# Patient Record
Sex: FEMALE | Race: WHITE | NOT HISPANIC OR LATINO | Employment: UNEMPLOYED | URBAN - METROPOLITAN AREA
[De-identification: names, ages, dates, MRNs, and addresses within clinical notes are randomized per-mention and may not be internally consistent; named-entity substitution may affect disease eponyms.]

---

## 2017-01-24 ENCOUNTER — OFFICE VISIT (OUTPATIENT)
Dept: INTERNAL MEDICINE | Facility: CLINIC | Age: 52
End: 2017-01-24

## 2017-01-24 VITALS
SYSTOLIC BLOOD PRESSURE: 140 MMHG | TEMPERATURE: 98.1 F | DIASTOLIC BLOOD PRESSURE: 86 MMHG | HEIGHT: 65 IN | BODY MASS INDEX: 29.46 KG/M2 | WEIGHT: 176.8 LBS

## 2017-01-24 DIAGNOSIS — F41.8 DEPRESSION WITH ANXIETY: ICD-10-CM

## 2017-01-24 DIAGNOSIS — M19.90 ARTHRITIS: ICD-10-CM

## 2017-01-24 DIAGNOSIS — E78.00 PURE HYPERCHOLESTEROLEMIA: Primary | ICD-10-CM

## 2017-01-24 DIAGNOSIS — J30.89 PERENNIAL ALLERGIC RHINITIS, UNSPECIFIED ALLERGIC RHINITIS TRIGGER: ICD-10-CM

## 2017-01-24 PROCEDURE — 99214 OFFICE O/P EST MOD 30 MIN: CPT | Performed by: FAMILY MEDICINE

## 2017-01-24 RX ORDER — ACETAMINOPHEN AND CODEINE PHOSPHATE 300; 30 MG/1; MG/1
TABLET ORAL
COMMUNITY
Start: 2016-12-21 | End: 2018-02-13

## 2017-01-24 RX ORDER — MONTELUKAST SODIUM 10 MG/1
10 TABLET ORAL DAILY
Qty: 30 TABLET | Refills: 5 | Status: SHIPPED | OUTPATIENT
Start: 2017-01-24 | End: 2018-02-13

## 2017-01-24 RX ORDER — ATORVASTATIN CALCIUM 10 MG/1
10 TABLET, FILM COATED ORAL DAILY
Qty: 30 TABLET | Refills: 5 | Status: SHIPPED | OUTPATIENT
Start: 2017-01-24 | End: 2018-02-13

## 2017-01-24 RX ORDER — ARIPIPRAZOLE 5 MG/1
TABLET ORAL
Qty: 30 TABLET | Refills: 0 | Status: SHIPPED | OUTPATIENT
Start: 2017-01-24 | End: 2017-02-27

## 2017-01-24 RX ORDER — DULOXETIN HYDROCHLORIDE 60 MG/1
60 CAPSULE, DELAYED RELEASE ORAL DAILY
Qty: 30 CAPSULE | Refills: 5 | Status: SHIPPED | OUTPATIENT
Start: 2017-01-24 | End: 2018-02-13

## 2017-01-24 RX ORDER — MELOXICAM 7.5 MG/1
TABLET ORAL
COMMUNITY
Start: 2016-11-28 | End: 2018-02-13 | Stop reason: SDUPTHER

## 2017-01-24 RX ORDER — HYDROCODONE BITARTRATE AND ACETAMINOPHEN 7.5; 325 MG/1; MG/1
TABLET ORAL
COMMUNITY
Start: 2016-12-20 | End: 2018-02-13

## 2017-01-24 RX ORDER — BACLOFEN 20 MG/1
20 TABLET ORAL 2 TIMES DAILY
Qty: 60 TABLET | Refills: 5 | Status: SHIPPED | OUTPATIENT
Start: 2017-01-24 | End: 2018-02-13

## 2017-01-24 NOTE — PROGRESS NOTES
Subjective   Vish Russo is a 51 y.o. female.     History of Present Illness   Here for routine. Last seen 9/8/16 for a foot injury. Was seen 8/16/16 for routine. Was seen 7/11/16 for CPE and mood. Had labs 7/11/16 with normal CBC, CMP, TSH and B12. Vit D was 22. Lipid demo , tg 219, HDL 67, . Lab 8/16/16 demo , tg 127, HDL 47,  on Lipitor 10.     1.CV- hyperlipid, currently on Lipitor. Had lab at goal 8/16/16. Pt was advised to cut dose if half prn increased leg cramps. Today pt reports she did cut the dose in half. Is doing well with current dose but is not sure what time to take it.     2.RESP- intermittent hoarseness, likely related to allergies. Pt was started on singulair and Claritin and improved 40%. Was given addition of Flonase. Was on Chantix and was encouraged to continue to work on smoking cessation. Today she reports she has been having dental work. Was on lortab for this but then got arrested for having this medicine on her person while driving (got pulled over for turning right on red). Was reduced to a misdemeanor with a fine as it was just in an improper container but was appropriate with no DUI. She finished the chantix but then got stressed with the situations and she restarted smoking.     3.GYN- ovarian cyst. Pt had US after her CPE and this demo an involuting cyst on the left. Was given prn tramadol. Was gradually improving. Limited the tramadol due to S/E. Today she reports the tramadol was not helpful. Is on pain meds for her ortho issues and cannot assess her ovarian issues due to the ortho pain.     4.ORTHO- right CTS. Pt using a brace and doing stretches but worsening and was referred to the hand specialist. . Pt was then seen 9/8/16 with a fall with multiple injuries. Had been to ER and had xray of left wrist, hip, knee and ankle; neg other than moderate OA. Had MRI ordered for foot by me but pt cancelled this. Today she reports she has been seeing [facility].  Did PT and then had the MRI of her foot and knee. Got a steroid shot in her right foot and her left knee 4 days ago. May need surgery as she has a neuroma in her foot and degenerated cartilage in her knee. Was advised this is from the fall. Is using tylenol #3. Is trying to work.    5. PSYCH- depression with anxiety- at her initial visit pt reported a h/o inadequate care (was being seen by someone impersonating a doctor) and had been afraid to go to a doctor since then. Was having issues with sad, irritable and anxious mood with guilt, anhedonia, feeling overwhelmed, fatigue, withdrawing, decreased motivation, crying, memory/ concentration problems, palpitations and insomnia. Also had manic episodes. Is now on Cymbalta with Seroquel. Has done well with no S/E and less anxiety. Was having some situational issues at last visit. Was on Seroquel 1 am, 2 pm. Today pt reports that she is now working nights and having trouble tolerating the seroquel due to sedation. Has not improved with changing her dose timing. Without it she is not sleeping and been crying more. Has not been on zyprexa or abilify before.     The following portions of the patient's history were reviewed and updated as appropriate: current medications, past family history, past medical history, past social history, past surgical history and problem list.    Review of Systems   Cardiovascular: Negative for chest pain.   Gastrointestinal: Negative for abdominal distention and abdominal pain.   Skin: Negative for color change.   Neurological: Negative for tremors, speech difficulty and headaches.   Psychiatric/Behavioral: Negative for agitation and confusion.   All other systems reviewed and are negative.        Current Outpatient Prescriptions:   •  acetaminophen-codeine (TYLENOL #3) 300-30 MG per tablet, , Disp: , Rfl:   •  ARIPiprazole (ABILIFY) 5 MG tablet, Take 1 tab po qhs, Disp: 30 tablet, Rfl: 0  •  atorvastatin (LIPITOR) 10 MG tablet, Take 1 tablet  "by mouth Daily., Disp: 30 tablet, Rfl: 5  •  baclofen (LIORESAL) 20 MG tablet, Take 1 tablet by mouth 2 (Two) Times a Day. Take 1 tablet 3 times daily as needed for muscle spasm., Disp: 60 tablet, Rfl: 5  •  DULoxetine (CYMBALTA) 60 MG capsule, Take 1 capsule by mouth Daily., Disp: 30 capsule, Rfl: 5  •  HYDROcodone-acetaminophen (NORCO) 7.5-325 MG per tablet, , Disp: , Rfl:   •  hydrOXYzine (ATARAX) 25 MG tablet, TAKE ONE TABLET BY MOUTH EVERY 6 HOURS, Disp: 30 tablet, Rfl: 0  •  meloxicam (MOBIC) 7.5 MG tablet, , Disp: , Rfl:   •  montelukast (SINGULAIR) 10 MG tablet, Take 1 tablet by mouth Daily. Take 1 tablet daily as needed for allergies, Disp: 30 tablet, Rfl: 5    Objective     Visit Vitals   • /86   • Temp 98.1 °F (36.7 °C)   • Ht 64.5\" (163.8 cm)   • Wt 176 lb 12.8 oz (80.2 kg)   • BMI 29.88 kg/m2       Physical Exam   Constitutional: She is oriented to person, place, and time. She appears well-developed and well-nourished.   HENT:   Right Ear: Tympanic membrane and ear canal normal.   Left Ear: Tympanic membrane and ear canal normal.   Mouth/Throat: Oropharynx is clear and moist.   Eyes: Conjunctivae and EOM are normal. Pupils are equal, round, and reactive to light.   Neck: No thyromegaly present.   Cardiovascular: Normal rate and regular rhythm.    Pulmonary/Chest: Effort normal and breath sounds normal.   Neurological: She is alert and oriented to person, place, and time.   Skin: Skin is warm and dry.   Psychiatric: She has a normal mood and affect.   Vitals reviewed.      Assessment/Plan   Vish was seen today for follow-up.    Diagnoses and all orders for this visit:    Pure hypercholesterolemia  -     atorvastatin (LIPITOR) 10 MG tablet; Take 1 tablet by mouth Daily.    Perennial allergic rhinitis, unspecified allergic rhinitis trigger  -     montelukast (SINGULAIR) 10 MG tablet; Take 1 tablet by mouth Daily. Take 1 tablet daily as needed for allergies    Arthritis  -     baclofen (LIORESAL) " "20 MG tablet; Take 1 tablet by mouth 2 (Two) Times a Day. Take 1 tablet 3 times daily as needed for muscle spasm.    Depression with anxiety  -     ARIPiprazole (ABILIFY) 5 MG tablet; Take 1 tab po qhs  -     DULoxetine (CYMBALTA) 60 MG capsule; Take 1 capsule by mouth Daily.      1. CV- hyperlipid- will continue the lipitor at a half dose with RF x6mo today.  2. RESP- allergies- will consider using the chantix again when ready. Will continue singulair now with RF x6mo today.  3. ORTHO- arthritis- treating with ortho now.  4. PSYCH- depression with anxiety- discussed that she still needs an \"atypical\" serotonin medicine in combo with the cymbalta. However, since the seroquel is too sedating, we can try abilify instead.  5. RECHECK- 1mo mood       "

## 2017-01-24 NOTE — PATIENT INSTRUCTIONS
"1. CV- hyperlipid- will continue the lipitor at a half dose with RF x6mo today.  2. RESP- allergies- will consider using the chantix again when ready. Will continue singulair now with RF x6mo today.  3. ORTHO- arthritis- treating with ortho now. Will RF baclofen today.  4. PSYCH- depression with anxiety- discussed that she still needs an \"atypical\" serotonin medicine in combo with the cymbalta. However, since the seroquel is too sedating, we can try abilify instead.  5. RECHECK- 1mo mood  "

## 2017-01-24 NOTE — MR AVS SNAPSHOT
Vish Russo   1/24/2017 11:00 AM   Office Visit    Dept Phone:  [phone]   Encounter #:  [id number]    Provider:  Leyda Alaniz MD   Department:  [facility]                Your Full Care Plan              Today's Medication Changes          These changes are accurate as of: 1/24/17 11:48 AM.  If you have any questions, ask your nurse or doctor.               New Medication(s)Ordered:     ARIPiprazole 5 MG tablet   Commonly known as:  ABILIFY   Take 1 tab po qhs   Started by:  Leyda Alaniz MD         Medication(s)that have changed:     DULoxetine 60 MG capsule   Commonly known as:  CYMBALTA   Take 1 capsule by mouth Daily.   What changed:  See the new instructions.   Changed by:  Leyda Alaniz MD       HYDROcodone-acetaminophen 7.5-325 MG per tablet   Commonly known as:  NORCO   What changed:  Another medication with the same name was removed. Continue taking this medication, and follow the directions you see here.   Changed by:  Leyda Alaniz MD         Stop taking medication(s)listed here:     QUEtiapine 50 MG tablet   Commonly known as:  SEROquel   Stopped by:  Leyda Alaniz MD           traMADol 50 MG tablet   Commonly known as:  ULTRAM   Stopped by:  Leyda Alaniz MD           varenicline 1 MG tablet   Commonly known as:  CHANTIX CONTINUING MONTH PAK   Stopped by:  Leyda Alaniz MD                Where to Get Your Medications      These medications were sent to [address] AT [facility] [phone] [facility] [phone] [place] [address]     Phone:  [phone]     ARIPiprazole 5 MG tablet    atorvastatin 10 MG tablet    baclofen 20 MG tablet    DULoxetine 60 MG capsule    montelukast 10 MG tablet                  Your Updated Medication List          This list is accurate as of: 1/24/17 11:48 AM.  Always use your most recent med list.  "               acetaminophen-codeine 300-30 MG per tablet   Commonly known as:  TYLENOL #3       ARIPiprazole 5 MG tablet   Commonly known as:  ABILIFY   Take 1 tab po qhs       atorvastatin 10 MG tablet   Commonly known as:  LIPITOR   Take 1 tablet by mouth Daily.       baclofen 20 MG tablet   Commonly known as:  LIORESAL   Take 1 tablet by mouth 2 (Two) Times a Day. Take 1 tablet 3 times daily as needed for muscle spasm.       DULoxetine 60 MG capsule   Commonly known as:  CYMBALTA   Take 1 capsule by mouth Daily.       HYDROcodone-acetaminophen 7.5-325 MG per tablet   Commonly known as:  NORCO       hydrOXYzine 25 MG tablet   Commonly known as:  ATARAX   TAKE ONE TABLET BY MOUTH EVERY 6 HOURS       meloxicam 7.5 MG tablet   Commonly known as:  MOBIC       montelukast 10 MG tablet   Commonly known as:  SINGULAIR   Take 1 tablet by mouth Daily. Take 1 tablet daily as needed for allergies               You Were Diagnosed With        Codes Comments    Pure hypercholesterolemia    -  Primary ICD-10-CM: E78.00  ICD-9-CM: 272.0     Perennial allergic rhinitis, unspecified allergic rhinitis trigger     ICD-10-CM: J30.89  ICD-9-CM: 477.8     Arthritis     ICD-10-CM: M19.90  ICD-9-CM: 716.90     Depression with anxiety     ICD-10-CM: F41.8  ICD-9-CM: 300.4       Instructions    1. CV- hyperlipid- will continue the lipitor at a half dose with RF x6mo today.  2. RESP- allergies- will consider using the chantix again when ready. Will continue singulair now with RF x6mo today.  3. ORTHO- arthritis- treating with ortho now. Will RF baclofen today.  4. PSYCH- depression with anxiety- discussed that she still needs an \"atypical\" serotonin medicine in combo with the cymbalta. However, since the seroquel is too sedating, we can try abilify instead.  5. RECHECK- 1mo mood     Patient Instructions History      Upcoming Appointments     Visit Type Date Time Department    FOLLOW UP 1/24/2017 11:00 AM [facility]    FOLLOW UP " "2/27/2017  9:30 AM [facility]      [place] Signup     Our records indicate that your [facility] [employer] account has been deactivated. If you would like to reactivate your account, please email [email or web address] or call [phone] to talk to our [employer] staff.             Other Info from Your Visit           Your Appointments     Feb 27, 2017  9:30 AM EST   Follow Up with Leyda Alaniz MD   [facility] PRIMARY CARE (--)    [facility] [place] [address] [address] [phone]           Arrive 15 minutes prior to appointment.              Allergies     Zyprexa [Olanzapine] Allergy Swelling      Reason for Visit     Follow-up ROUTINE CHOL, ETC      Vital Signs     Blood Pressure Temperature Height Weight Body Mass Index Smoking Status    140/86 98.1 °F (36.7 °C) 64.5\" (163.8 cm) 176 lb 12.8 oz (80.2 kg) 29.88 kg/m2 Current Every Day Smoker      Problems and Diagnoses Noted     Arthritis    Depression with anxiety    Perennial allergic rhinitis    High cholesterol        "

## 2017-02-27 ENCOUNTER — OFFICE VISIT (OUTPATIENT)
Dept: INTERNAL MEDICINE | Facility: CLINIC | Age: 52
End: 2017-02-27

## 2017-02-27 VITALS
DIASTOLIC BLOOD PRESSURE: 76 MMHG | HEIGHT: 65 IN | BODY MASS INDEX: 30.22 KG/M2 | SYSTOLIC BLOOD PRESSURE: 134 MMHG | WEIGHT: 181.4 LBS | TEMPERATURE: 98 F

## 2017-02-27 DIAGNOSIS — F41.8 DEPRESSION WITH ANXIETY: Primary | ICD-10-CM

## 2017-02-27 PROCEDURE — 99213 OFFICE O/P EST LOW 20 MIN: CPT | Performed by: FAMILY MEDICINE

## 2017-02-27 NOTE — PATIENT INSTRUCTIONS
1. PSYCH- depression with anxiety- discussed options. Will continue the cymbalta and change the abilify to vraylar.  2. RECHECK- 1mo

## 2017-02-27 NOTE — PROGRESS NOTES
Subjective   Vish Russo is a 51 y.o. female.     History of Present Illness   Here for 1mo recheck mood. Last seen 1/24/17 for routine. Was seen 7/11/16 for CPE and mood. Had labs 7/11/16 with normal CBC, CMP, TSH and B12. Vit D was 22. Lipid demo , tg 219, HDL 67, . Lab 8/16/16 demo , tg 127, HDL 47,  on Lipitor 10.     1.CV- hyperlipid, currently on Lipitor; at goal at half dose.     2.RESP- intermittent hoarseness, likely related to allergies. Treated with singulair, Claritin and Flonase. Quit smoking with Chantix but then restarted 2016 when got very stressed   3.GYN- ovarian cyst. Pt had US after her CPE and this demo an involuting cyst on the left. Did not respond to tramadol but was on hydrocodone by ortho at last visit with this issue put on hold.    4.ORTHO- right CTS. Pt using a brace and doing stretches but worsening and was referred to the hand specialist. Pt was then seen 9/8/16 with a fall with multiple injuries. Had been to ER and had xray of left wrist, hip, knee and ankle; neg other than moderate OA. Had MRI ordered for foot by me but pt cancelled this. Today she reports she has been seeing [facility]. Did PT and then had the MRI of her foot and knee. Got a steroid shot in her right foot and her left knee 4 days ago. May need surgery as she has a neuroma in her foot and degenerated cartilage in her knee. Was advised this is from the fall. Is using tylenol #3. Is trying to work.     5. PSYCH- depression with anxiety- at her initial visit pt reported a h/o inadequate care (was being seen by someone impersonating a doctor) and had been afraid to go to a doctor since then. Was having issues with sad, irritable and anxious mood with guilt, anhedonia, feeling overwhelmed, fatigue, withdrawing, decreased motivation, crying, memory/ concentration problems, palpitations and insomnia. Also had manic episodes. Was started on Cymbalta with Seroquel and did well but at her last visit  she was overly sedated with the Seroquel and it was affecting her work, especially since she had changed shifts. Was continued on Cymbalta and Seroquel was changed to abilify. Today she reports she has felt anxious with 6 lb weight gain and insomnia on the abilify (went 3 days with no sleep). She tried going back to seroquel 50 but was over sedated again. Had swelling with zyprexa.     The following portions of the patient's history were reviewed and updated as appropriate: current medications, past family history, past medical history, past social history, past surgical history and problem list.    Review of Systems   Cardiovascular: Negative for chest pain.   Gastrointestinal: Negative for abdominal distention and abdominal pain.   Skin: Negative for color change.   Neurological: Negative for tremors, speech difficulty and headaches.   Psychiatric/Behavioral: Negative for agitation and confusion.   All other systems reviewed and are negative.        Current Outpatient Prescriptions:   •  acetaminophen-codeine (TYLENOL #3) 300-30 MG per tablet, , Disp: , Rfl:   •  atorvastatin (LIPITOR) 10 MG tablet, Take 1 tablet by mouth Daily., Disp: 30 tablet, Rfl: 5  •  baclofen (LIORESAL) 20 MG tablet, Take 1 tablet by mouth 2 (Two) Times a Day. Take 1 tablet 3 times daily as needed for muscle spasm., Disp: 60 tablet, Rfl: 5  •  Cariprazine HCl (VRAYLAR) 1.5 MG capsule capsule, Take 1 capsule by mouth Daily., Disp: 30 capsule, Rfl: 0  •  DULoxetine (CYMBALTA) 60 MG capsule, Take 1 capsule by mouth Daily., Disp: 30 capsule, Rfl: 5  •  HYDROcodone-acetaminophen (NORCO) 7.5-325 MG per tablet, , Disp: , Rfl:   •  hydrOXYzine (ATARAX) 25 MG tablet, TAKE ONE TABLET BY MOUTH EVERY 6 HOURS, Disp: 30 tablet, Rfl: 0  •  meloxicam (MOBIC) 7.5 MG tablet, , Disp: , Rfl:   •  montelukast (SINGULAIR) 10 MG tablet, Take 1 tablet by mouth Daily. Take 1 tablet daily as needed for allergies, Disp: 30 tablet, Rfl: 5    Objective     Visit Vitals  "  • /76   • Temp 98 °F (36.7 °C)   • Ht 64.5\" (163.8 cm)   • Wt 181 lb 6.4 oz (82.3 kg)   • BMI 30.66 kg/m2       Physical Exam   Constitutional: She is oriented to person, place, and time. She appears well-developed and well-nourished.   HENT:   Right Ear: Tympanic membrane and ear canal normal.   Left Ear: Tympanic membrane and ear canal normal.   Mouth/Throat: Oropharynx is clear and moist.   Eyes: Conjunctivae and EOM are normal. Pupils are equal, round, and reactive to light.   Neck: No thyromegaly present.   Cardiovascular: Normal rate and regular rhythm.    Pulmonary/Chest: Effort normal and breath sounds normal.   Neurological: She is alert and oriented to person, place, and time.   Skin: Skin is warm and dry.   Psychiatric: She has a normal mood and affect.   Vitals reviewed.      Assessment/Plan   Vish was seen today for follow-up.    Diagnoses and all orders for this visit:    Depression with anxiety  -     Cariprazine HCl (VRAYLAR) 1.5 MG capsule capsule; Take 1 capsule by mouth Daily.      1. PSYCH- depression with anxiety- discussed options. Will continue the cymbalta and change the abilify to vraylar.  2. RECHECK- 1mo       "

## 2017-03-20 ENCOUNTER — LAB (OUTPATIENT)
Dept: INTERNAL MEDICINE | Facility: CLINIC | Age: 52
End: 2017-03-20

## 2017-03-20 DIAGNOSIS — M25.562 LEFT KNEE PAIN, UNSPECIFIED CHRONICITY: Primary | ICD-10-CM

## 2017-03-20 LAB
ALBUMIN SERPL-MCNC: 4.5 G/DL (ref 3.2–4.8)
ALBUMIN/GLOB SERPL: 2 G/DL (ref 1.5–2.5)
ALP SERPL-CCNC: 70 U/L (ref 25–100)
ALT SERPL W P-5'-P-CCNC: 17 U/L (ref 7–40)
ANION GAP SERPL CALCULATED.3IONS-SCNC: 0 MMOL/L (ref 3–11)
AST SERPL-CCNC: 15 U/L (ref 0–33)
BASOPHILS # BLD AUTO: 0.07 10*3/MM3 (ref 0–0.2)
BASOPHILS NFR BLD AUTO: 0.9 % (ref 0–1)
BILIRUB SERPL-MCNC: 0.4 MG/DL (ref 0.3–1.2)
BUN BLD-MCNC: 16 MG/DL (ref 9–23)
BUN/CREAT SERPL: 22.9 (ref 7–25)
CALCIUM SPEC-SCNC: 10.1 MG/DL (ref 8.7–10.4)
CHLORIDE SERPL-SCNC: 110 MMOL/L (ref 99–109)
CO2 SERPL-SCNC: 34 MMOL/L (ref 20–31)
CREAT BLD-MCNC: 0.7 MG/DL (ref 0.6–1.3)
DEPRECATED RDW RBC AUTO: 49 FL (ref 37–54)
EOSINOPHIL # BLD AUTO: 0.2 10*3/MM3 (ref 0.1–0.3)
EOSINOPHIL NFR BLD AUTO: 2.5 % (ref 0–3)
ERYTHROCYTE [DISTWIDTH] IN BLOOD BY AUTOMATED COUNT: 14.6 % (ref 11.3–14.5)
GFR SERPL CREATININE-BSD FRML MDRD: 88 ML/MIN/1.73
GLOBULIN UR ELPH-MCNC: 2.3 GM/DL
GLUCOSE BLD-MCNC: 102 MG/DL (ref 70–100)
HCT VFR BLD AUTO: 44.3 % (ref 34.5–44)
HGB BLD-MCNC: 14.5 G/DL (ref 11.5–15.5)
IMM GRANULOCYTES # BLD: 0.02 10*3/MM3 (ref 0–0.03)
IMM GRANULOCYTES NFR BLD: 0.2 % (ref 0–0.6)
LYMPHOCYTES # BLD AUTO: 2.1 10*3/MM3 (ref 0.6–4.8)
LYMPHOCYTES NFR BLD AUTO: 25.9 % (ref 24–44)
MCH RBC QN AUTO: 29.8 PG (ref 27–31)
MCHC RBC AUTO-ENTMCNC: 32.7 G/DL (ref 32–36)
MCV RBC AUTO: 91.2 FL (ref 80–99)
MONOCYTES # BLD AUTO: 0.76 10*3/MM3 (ref 0–1)
MONOCYTES NFR BLD AUTO: 9.4 % (ref 0–12)
NEUTROPHILS # BLD AUTO: 4.96 10*3/MM3 (ref 1.5–8.3)
NEUTROPHILS NFR BLD AUTO: 61.1 % (ref 41–71)
PLATELET # BLD AUTO: 295 10*3/MM3 (ref 150–450)
PMV BLD AUTO: 10.9 FL (ref 6–12)
POTASSIUM BLD-SCNC: 4.4 MMOL/L (ref 3.5–5.5)
PROT SERPL-MCNC: 6.8 G/DL (ref 5.7–8.2)
RBC # BLD AUTO: 4.86 10*6/MM3 (ref 3.89–5.14)
SODIUM BLD-SCNC: 144 MMOL/L (ref 132–146)
WBC NRBC COR # BLD: 8.11 10*3/MM3 (ref 3.5–10.8)

## 2017-03-20 PROCEDURE — 80053 COMPREHEN METABOLIC PANEL: CPT | Performed by: FAMILY MEDICINE

## 2017-03-20 PROCEDURE — 85025 COMPLETE CBC W/AUTO DIFF WBC: CPT | Performed by: FAMILY MEDICINE

## 2017-03-30 ENCOUNTER — TELEPHONE (OUTPATIENT)
Dept: INTERNAL MEDICINE | Facility: CLINIC | Age: 52
End: 2017-03-30

## 2017-03-30 NOTE — TELEPHONE ENCOUNTER
----- Message from Wendi Kaye sent at 3/30/2017  1:45 PM EDT -----  MIRIAM GONZÁLES HAD KNEE SURGERY, SHE CANCELLED HER APPT. SHE WANTS DR. INIGUEZ TO KNOW THE MEDS ARE WORKING

## 2017-04-10 DIAGNOSIS — F41.8 DEPRESSION WITH ANXIETY: ICD-10-CM

## 2017-06-05 ENCOUNTER — LAB (OUTPATIENT)
Dept: INTERNAL MEDICINE | Facility: CLINIC | Age: 52
End: 2017-06-05

## 2017-06-05 DIAGNOSIS — Z01.818 PREOP TESTING: Primary | ICD-10-CM

## 2017-06-05 LAB
ANION GAP SERPL CALCULATED.3IONS-SCNC: 5 MMOL/L (ref 3–11)
BUN BLD-MCNC: 16 MG/DL (ref 9–23)
BUN/CREAT SERPL: 22.9 (ref 7–25)
CALCIUM SPEC-SCNC: 9.9 MG/DL (ref 8.7–10.4)
CHLORIDE SERPL-SCNC: 107 MMOL/L (ref 99–109)
CO2 SERPL-SCNC: 29 MMOL/L (ref 20–31)
CREAT BLD-MCNC: 0.7 MG/DL (ref 0.6–1.3)
DEPRECATED RDW RBC AUTO: 47.7 FL (ref 37–54)
ERYTHROCYTE [DISTWIDTH] IN BLOOD BY AUTOMATED COUNT: 14.4 % (ref 11.3–14.5)
GFR SERPL CREATININE-BSD FRML MDRD: 88 ML/MIN/1.73
GLUCOSE BLD-MCNC: 121 MG/DL (ref 70–100)
HCT VFR BLD AUTO: 44.7 % (ref 34.5–44)
HGB BLD-MCNC: 15 G/DL (ref 11.5–15.5)
MCH RBC QN AUTO: 30.4 PG (ref 27–31)
MCHC RBC AUTO-ENTMCNC: 33.6 G/DL (ref 32–36)
MCV RBC AUTO: 90.5 FL (ref 80–99)
PLATELET # BLD AUTO: 274 10*3/MM3 (ref 150–450)
PMV BLD AUTO: 11.7 FL (ref 6–12)
POTASSIUM BLD-SCNC: 4.8 MMOL/L (ref 3.5–5.5)
RBC # BLD AUTO: 4.94 10*6/MM3 (ref 3.89–5.14)
SODIUM BLD-SCNC: 141 MMOL/L (ref 132–146)
WBC NRBC COR # BLD: 7.97 10*3/MM3 (ref 3.5–10.8)

## 2017-06-05 PROCEDURE — 80048 BASIC METABOLIC PNL TOTAL CA: CPT | Performed by: FAMILY MEDICINE

## 2017-06-05 PROCEDURE — 85027 COMPLETE CBC AUTOMATED: CPT | Performed by: FAMILY MEDICINE

## 2018-02-13 ENCOUNTER — OFFICE VISIT (OUTPATIENT)
Dept: INTERNAL MEDICINE | Facility: CLINIC | Age: 53
End: 2018-02-13

## 2018-02-13 VITALS — HEIGHT: 65 IN | WEIGHT: 187.8 LBS | BODY MASS INDEX: 31.29 KG/M2 | TEMPERATURE: 97.7 F

## 2018-02-13 DIAGNOSIS — E04.0 GOITER, SIMPLE: ICD-10-CM

## 2018-02-13 DIAGNOSIS — M19.90 ARTHRITIS: Primary | ICD-10-CM

## 2018-02-13 LAB
T4 FREE SERPL-MCNC: 1.35 NG/DL (ref 0.89–1.76)
TSH SERPL DL<=0.05 MIU/L-ACNC: 0.25 MIU/ML (ref 0.35–5.35)

## 2018-02-13 PROCEDURE — 86376 MICROSOMAL ANTIBODY EACH: CPT | Performed by: FAMILY MEDICINE

## 2018-02-13 PROCEDURE — 84443 ASSAY THYROID STIM HORMONE: CPT | Performed by: FAMILY MEDICINE

## 2018-02-13 PROCEDURE — 99214 OFFICE O/P EST MOD 30 MIN: CPT | Performed by: FAMILY MEDICINE

## 2018-02-13 PROCEDURE — 84439 ASSAY OF FREE THYROXINE: CPT | Performed by: FAMILY MEDICINE

## 2018-02-13 PROCEDURE — 86800 THYROGLOBULIN ANTIBODY: CPT | Performed by: FAMILY MEDICINE

## 2018-02-13 RX ORDER — OXYCODONE AND ACETAMINOPHEN 10; 325 MG/1; MG/1
TABLET ORAL
COMMUNITY
Start: 2018-01-28 | End: 2018-02-13

## 2018-02-13 RX ORDER — MELOXICAM 15 MG/1
15 TABLET ORAL DAILY
Qty: 30 TABLET | Refills: 1 | Status: SHIPPED | OUTPATIENT
Start: 2018-02-13 | End: 2019-04-26 | Stop reason: SDUPTHER

## 2018-02-13 NOTE — PROGRESS NOTES
Subjective   Vish Russo is a 52 y.o. female.     History of Present Illness   Here for a lump in armpit. Last seen 2/27/17 for 1mo recheck mood; did not keep recheck after that. Last seen 2/27/17 for mood. Was seen 1/24/17 for routine. Was seen 7/11/16 for CPE and mood. Had labs 7/11/16 with normal CBC, CMP, TSH and B12. Vit D was 22. Lipid demo , tg 219, HDL 67, . Lab 8/16/16 demo , tg 127, HDL 47,  on Lipitor 10.     1.CV- hyperlipid, currently on Lipitor; at goal at half dose. Today pt reports she stopped all meds.     2.RESP- intermittent hoarseness, likely related to allergies. Treated with singulair, Claritin and Flonase. Quit smoking with Chantix but then restarted 2016 when got very stressed   3.GYN- ovarian cyst. Pt had US after her CPE and this demo an involuting cyst on the left. Did not respond to tramadol but was on hydrocodone by ortho at last visit with this issue put on hold.      4.ORTHO- right CTS. Pt using a brace and doing stretches but worsening and was referred to the hand specialist. Pt was then seen 9/8/16 with a fall with multiple injuries. Had been to ER and had xray of left wrist, hip, knee and ankle; neg other than moderate OA. Pt went to [facility] with MRI of her foot and knee. Got a steroid shot in her right foot and her left knee. Was advised she may need surgery as she has a neuroma in her foot and degenerated cartilage in her knee. Was advised this was from the fall. Today pt reports she has had arthroscopy on her left knee and neuroma resection left foot. Was doing ok until 3mo ago when her left shoulder started to hurt. 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 Describes stiff neck and arm soreness and then she found 2 lumps in her armpit.     5. PSYCH- depression with anxiety- at her initial visit pt reported a h/o inadequate care (was seen by someone impersonating a doctor) and had been afraid to go to a doctor since then. Was having sad, irritable  "and anxious mood with guilt, anhedonia, feeling overwhelmed, fatigue, withdrawing, decreased motivation, crying, memory/ concentration problems, palpitations and insomnia. Also had manic episodes. Was started on Cymbalta with Seroquel initially but eventually c/o feeling over sedated. Did not do well with abilify and had had swelling in past with Zyprexa. Was changed to vraylar (continued on Cymbalta). Today she reports she stopped all meds a year ago. Has been ok trying to work on her lifestyle. Still cries easily.    6. ENDO- goiter. On discussion, pt has all of the listed symptoms for hypothyroid: hoarseness, fatigue, weight gain, dry skin, brittle nails, constipation, etc.     The following portions of the patient's history were reviewed and updated as appropriate: current medications, past family history, past medical history, past social history, past surgical history and problem list.    Review of Systems   Cardiovascular: Negative for chest pain.   Gastrointestinal: Negative for abdominal distention and abdominal pain.   Genitourinary:        Left sided bumps under arm pit and breast tissue   Skin: Negative for color change.   Neurological: Negative for tremors, speech difficulty and headaches.   Psychiatric/Behavioral: Negative for agitation and confusion.   All other systems reviewed and are negative.        Current Outpatient Prescriptions:   •  meloxicam (MOBIC) 15 MG tablet, Take 1 tablet by mouth Daily. Prn pain or inflammation, Disp: 30 tablet, Rfl: 1    Objective     Temp 97.7 °F (36.5 °C)  Ht 163.8 cm (64.5\")  Wt 85.2 kg (187 lb 12.8 oz)  BMI 31.74 kg/m2    Physical Exam   Constitutional: She is oriented to person, place, and time. She appears well-developed and well-nourished.   HENT:   Right Ear: Tympanic membrane and ear canal normal.   Left Ear: Tympanic membrane and ear canal normal.   Mouth/Throat: Oropharynx is clear and moist.   Eyes: Conjunctivae and EOM are normal. Pupils are equal, " round, and reactive to light.   Neck: Thyromegaly present.   Cardiovascular: Normal rate and regular rhythm.    Pulmonary/Chest: Effort normal and breath sounds normal.   Musculoskeletal:   Full ROM of left shoulder. She does have what feel to be 2 small, tender lymph nodes in the anterior axilla   Neurological: She is alert and oriented to person, place, and time.   Skin: Skin is warm and dry.   Psychiatric: She has a normal mood and affect.   Vitals reviewed.      Assessment/Plan   Vish was seen today for cyst.    Diagnoses and all orders for this visit:    Arthritis  -     meloxicam (MOBIC) 15 MG tablet; Take 1 tablet by mouth Daily. Prn pain or inflammation    Goiter, simple  -     TSH  -     T4, Free  -     Thyroid Antibodies  -     US Thyroid      1. ORTHO- arthritis- discussed that the neck and shoulder pain are likely related to arthritis from her work. Will restart mobic now. Discussed that the small lymph nodes are likely reactive to the arthritis.   2. ENDO- goiter. On exam pt has goiter. Will check labs and a thyroid US. Will consider treatment based on level of symptoms and work up.  3. RECHECK- 3wk recheck

## 2018-02-13 NOTE — PATIENT INSTRUCTIONS
1. ORTHO- arthritis- discussed that the neck and shoulder pain are likely related to arthritis from her work. Will restart mobic now. Discussed that the small lymph nodes are likely reactive to the arthritis.   2. ENDO- goiter. On exam pt has goiter. Will check labs and a thyroid US. Will consider treatment based on level of symptoms and work up. Will reassess her lipid and mood after this issue is addressed.  3. RECHECK- 3wk recheck

## 2018-02-15 LAB
THYROGLOB AB SERPL-ACNC: <1 IU/ML (ref 0–0.9)
THYROPEROXIDASE AB SERPL-ACNC: 13 IU/ML (ref 0–34)

## 2018-02-16 ENCOUNTER — HOSPITAL ENCOUNTER (OUTPATIENT)
Dept: ULTRASOUND IMAGING | Facility: HOSPITAL | Age: 53
Discharge: HOME | End: 2018-02-16
Attending: FAMILY MEDICINE | Admitting: FAMILY MEDICINE

## 2018-02-16 PROCEDURE — 76536 US EXAM OF HEAD AND NECK: CPT

## 2018-02-20 ENCOUNTER — OFFICE VISIT (OUTPATIENT)
Dept: INTERNAL MEDICINE | Facility: CLINIC | Age: 53
End: 2018-02-20

## 2018-02-20 VITALS
RESPIRATION RATE: 16 BRPM | BODY MASS INDEX: 31.32 KG/M2 | HEART RATE: 90 BPM | HEIGHT: 65 IN | OXYGEN SATURATION: 97 % | WEIGHT: 188 LBS | SYSTOLIC BLOOD PRESSURE: 136 MMHG | DIASTOLIC BLOOD PRESSURE: 82 MMHG

## 2018-02-20 DIAGNOSIS — E04.0 GOITER DIFFUSE, NONTOXIC: Primary | ICD-10-CM

## 2018-02-20 PROCEDURE — 99214 OFFICE O/P EST MOD 30 MIN: CPT | Performed by: FAMILY MEDICINE

## 2018-02-20 RX ORDER — LEVOTHYROXINE SODIUM 0.03 MG/1
TABLET ORAL
Qty: 30 TABLET | Refills: 0 | Status: SHIPPED | OUTPATIENT
Start: 2018-02-20 | End: 2018-03-20 | Stop reason: SDUPTHER

## 2018-02-20 NOTE — PATIENT INSTRUCTIONS
"1. ENDO- goiter- education today re the purpose of thyroid, T4 and phathophysioly of goiter provided. Will do trial with low dose synthroid as the goiter may be the thyroid struggling and causing vascilations in her hormone. Printout provided. Pt to stop the synthroid if she feels like she is going too high.  2. RECHECK- 6wk    Patient education regarding hypothyroidism provided today in layman's terms. Pt advised regarding the location and function of the thyroid (thyroid hormone as a regulator for other body systems). Discussed common signs or symptoms of low thyroid including fatigue, hair loss, weight gain, hoarseness, depression, slow speech, dry skin, brittle nails, feeling cold, constipation and joint/ muscle pain. Also discussed the signs/ symptoms of too much (high) thyroid including fatigue, fast heart rate, weight loss, increased appetite, anxiousness, sweating, muscle aches and loose stools.    Discussed that the goal is to be \"euthryoid\", meaning that the patient has the correct amount of thyroid hormone available. This is accomplished using synthetic hormone, levothyroxine (Synthroid). Discussed that decisions regarding the dose of levothyroxine are made using a lab called TSH (thyroid stimulating hormone) and patient symptoms. The patient is also advised of the importance of taking levothyroxine correctly (on an empty stomach and waiting 1 hour before eating, drinking or taking other medicines) to ensure adequate absorption of the medicine.   "

## 2018-02-20 NOTE — PROGRESS NOTES
Subjective   Vish Russo is a 52 y.o. female.     History of Present Illness   Here for recheck goiter. Last seen 2/13/18 for a lump in armpit. Had not been seen since 2/27/17.  Was seen 7/11/16 for CPE and mood. Lab 2/13/18 demo TSH 0.253, free T4 1.35, neg thyroid abs. US demo multinodular goiter. Had labs 7/11/16 with normal CBC, CMP, TSH and B12. Vit D was 22. Lipid demo , tg 219, HDL 67, . Lab 8/16/16 demo , tg 127, HDL 47,  on Lipitor 10.     1.CV- hyperlipid. Pt stopped all meds including Lipitor.   2.RESP- intermittent hoarseness, likely related to allergies. Treated with singulair, Claritin and Flonase. Quit smoking with Chantix but then restarted 2016 when got very stressed   3. PSYCH- depression with anxiety- at her initial visit pt reported a h/o inadequate care (was seen by someone impersonating a doctor) and had been afraid to go to a doctor since then. Was having sad, irritable and anxious mood with guilt, anhedonia, feeling overwhelmed, fatigue, withdrawing, decreased motivation, crying, memory/ concentration problems, palpitations and insomnia. Also had manic episodes. Was started on Cymbalta with Seroquel initially but eventually c/o feeling over sedated. Did not do well with abilify and had had swelling in past with Zyprexa. Was changed to vraylar (continued on Cymbalta). Pt then stopped all meds. Was working on lifestyle but still cried easily.  4. ORTHO- right CTS. Pt using a brace and doing stretches but worsening and was referred to the hand specialist. Pt was then seen 9/8/16 with a fall with multiple injuries. Had been to ER and had xray of left wrist, hip, knee and ankle; neg other than moderate OA. Pt went to [facility] with MRI of her foot and knee. Did not respond to shot in right foot and left knee. Then had arthroscopy on her left knee and neuroma resection left foot. 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 Was restarted on Mobic.      5. ENDO- goiter. Found on exam. Pt c/o hoarseness, fatigue, weight gain, dry skin, brittle nails, constipation, etc. Had slightly suppressed TSH, normal free T4 and US that demo multiple nodules, none predominant. Today pt reports she has noted a brown discoloration around her throat.     The following portions of the patient's history were reviewed and updated as appropriate: current medications, past family history, past medical history, past social history, past surgical history and problem list.    Review of Systems   Constitutional: Positive for fatigue.   Cardiovascular: Negative for chest pain.   Gastrointestinal: Negative for abdominal distention and abdominal pain.   Musculoskeletal: Positive for neck pain and neck stiffness.   Skin: Negative for color change.   Neurological: Negative for tremors, speech difficulty and headaches.   Psychiatric/Behavioral: Negative for agitation and confusion.   All other systems reviewed and are negative.        Current Outpatient Prescriptions:   •  levothyroxine (SYNTHROID, LEVOTHROID) 25 MCG tablet, Take 1 tab po qam fasting, wait 1hr for food or other meds. BRAND ONLY, Disp: 30 tablet, Rfl: 0  •  meloxicam (MOBIC) 15 MG tablet, Take 1 tablet by mouth Daily. Prn pain or inflammation, Disp: 30 tablet, Rfl: 1    Objective     /82  Pulse 90  Resp 16  Ht 163.8 cm (64.5\")  Wt 85.3 kg (188 lb)  SpO2 97%  BMI 31.77 kg/m2    Physical Exam   Constitutional: She is oriented to person, place, and time. She appears well-developed and well-nourished.   HENT:   Right Ear: Tympanic membrane and ear canal normal.   Left Ear: Tympanic membrane and ear canal normal.   Mouth/Throat: Oropharynx is clear and moist.   Eyes: Conjunctivae and EOM are normal. Pupils are equal, round, and reactive to light.   Neck: No thyromegaly present.   Cardiovascular: Normal rate and regular rhythm.    Pulmonary/Chest: Effort normal and breath sounds normal. "   Neurological: She is alert and oriented to person, place, and time.   Skin: Skin is warm and dry.   Psychiatric: She has a normal mood and affect.   Vitals reviewed.      Assessment/Plan   Vish was seen today for thyroid problem.    Diagnoses and all orders for this visit:    Goiter diffuse, nontoxic  -     levothyroxine (SYNTHROID, LEVOTHROID) 25 MCG tablet; Take 1 tab po qam fasting, wait 1hr for food or other meds. BRAND ONLY      1. ENDO- goiter- education today re the purpose of thyroid, T4 and phathophysioly of goiter provided. Will do trial with low dose synthroid as the goiter may be the thyroid struggling and causing vascilations in her hormone. Printout provided. Pt to stop the synthroid if she feels like she is going too high.  2. RECHECK- 6wk

## 2018-03-20 ENCOUNTER — TELEPHONE (OUTPATIENT)
Dept: INTERNAL MEDICINE | Facility: CLINIC | Age: 53
End: 2018-03-20

## 2018-03-20 DIAGNOSIS — E04.0 GOITER DIFFUSE, NONTOXIC: ICD-10-CM

## 2018-03-20 RX ORDER — LEVOTHYROXINE SODIUM 0.03 MG/1
TABLET ORAL
Qty: 30 TABLET | Refills: 0 | Status: SHIPPED | OUTPATIENT
Start: 2018-03-20 | End: 2018-04-24 | Stop reason: SDUPTHER

## 2018-04-24 ENCOUNTER — OFFICE VISIT (OUTPATIENT)
Dept: INTERNAL MEDICINE | Facility: CLINIC | Age: 53
End: 2018-04-24

## 2018-04-24 VITALS
BODY MASS INDEX: 31.02 KG/M2 | SYSTOLIC BLOOD PRESSURE: 134 MMHG | DIASTOLIC BLOOD PRESSURE: 88 MMHG | TEMPERATURE: 97.4 F | HEIGHT: 65 IN | WEIGHT: 186.2 LBS

## 2018-04-24 DIAGNOSIS — Z72.0 SMOKING TRYING TO QUIT: ICD-10-CM

## 2018-04-24 DIAGNOSIS — E04.0 GOITER DIFFUSE, NONTOXIC: Primary | ICD-10-CM

## 2018-04-24 LAB
T4 FREE SERPL-MCNC: 1.46 NG/DL (ref 0.89–1.76)
TSH SERPL DL<=0.05 MIU/L-ACNC: 0.37 MIU/ML (ref 0.35–5.35)

## 2018-04-24 PROCEDURE — 99213 OFFICE O/P EST LOW 20 MIN: CPT | Performed by: FAMILY MEDICINE

## 2018-04-24 PROCEDURE — 84443 ASSAY THYROID STIM HORMONE: CPT | Performed by: FAMILY MEDICINE

## 2018-04-24 PROCEDURE — 84439 ASSAY OF FREE THYROXINE: CPT | Performed by: FAMILY MEDICINE

## 2018-04-24 RX ORDER — LEVOTHYROXINE SODIUM 0.05 MG/1
TABLET ORAL
Qty: 30 TABLET | Refills: 1 | Status: SHIPPED | OUTPATIENT
Start: 2018-04-24 | End: 2018-06-07 | Stop reason: SDUPTHER

## 2018-04-24 RX ORDER — VARENICLINE TARTRATE 1 MG/1
1 TABLET, FILM COATED ORAL 2 TIMES DAILY
Qty: 60 TABLET | Refills: 0 | Status: SHIPPED | OUTPATIENT
Start: 2018-04-24 | End: 2018-06-07 | Stop reason: SDUPTHER

## 2018-04-24 RX ORDER — HYDROCODONE BITARTRATE AND ACETAMINOPHEN 10; 325 MG/1; MG/1
TABLET ORAL
COMMUNITY
Start: 2018-03-23 | End: 2019-08-20

## 2018-04-24 RX ORDER — MONTELUKAST SODIUM 10 MG/1
TABLET ORAL
Qty: 30 TABLET | Refills: 2 | Status: SHIPPED | OUTPATIENT
Start: 2018-04-24 | End: 2020-01-22

## 2018-04-24 NOTE — PROGRESS NOTES
Subjective   Vish Russo is a 53 y.o. female.     History of Present Illness   Here for 6wk recheck goiter. Last seen 2/20/18 for goiter.  Was seen 7/11/16 for CPE and mood. Lab 2/13/18 demo TSH 0.253, free T4 1.35, neg thyroid abs. US demo multinodular goiter. Had labs 7/11/16 with normal CBC, CMP, TSH and B12. Vit D was 22. Lipid demo , tg 219, HDL 67, . Lab 8/16/16 demo , tg 127, HDL 47,  on Lipitor 10.     1.CV- hyperlipid. Pt stopped all meds including Lipitor.   2. PSYCH- depression with anxiety- at her initial visit pt reported a h/o inadequate care (was seen by someone impersonating a doctor) and had been afraid to go to a doctor since then. Was having sad, irritable and anxious mood with guilt, anhedonia, feeling overwhelmed, fatigue, withdrawing, decreased motivation, crying, memory/ concentration problems, palpitations and insomnia. Also had manic episodes. Was started on Cymbalta with Seroquel initially but eventually c/o feeling over sedated. Did not do well with abilify and had had swelling in past with Zyprexa. Was changed to vraylar (continued on Cymbalta). Pt then stopped all meds. Was working on lifestyle but still cried easily. Advised we would reassess after thyroid addressed.  3. ORTHO- right CTS. Pt using a brace and doing stretches but worsening and was referred to the hand specialist. Pt was then seen 9/8/16 with a fall with multiple injuries. Had been to ER and had xray of left wrist, hip, knee and ankle; neg other than moderate OA. Pt went to [facility] with MRI of her foot and knee. Did not respond to shot in right foot and left knee. Then had arthroscopy on her left knee and neuroma resection left foot. Did well but has continued to have left shoulder pain, likely related to working as a . Was restarted on Mobic.      4. ENDO- goiter. Found on exam. Pt c/o hoarseness, fatigue, weight gain, dry skin, brittle nails, constipation, etc. Had slightly  suppressed TSH, normal free T4 and US that demo multiple nodules, none predominant. Was seen with hyperpigmentation around her neck. Decision made to do trial with low dose synthroid 25. Today pt reports she is still having trouble swallowing and feels like it is actually getting worse instead of better. Feels her voice is getting better. No change in other symptoms. Is using her flonase and claritin but not the claritin for her allergies.     5. 2.RESP- intermittent hoarseness, likely related to allergies. Treated with singulair, Claritin and Flonase. Quit smoking with Chantix but then restarted 2016 when got very stressed. Today pt reports she is ready to take chantix again.    The following portions of the patient's history were reviewed and updated as appropriate: current medications, past family history, past medical history, past social history, past surgical history and problem list.    Review of Systems   HENT: Positive for trouble swallowing.    Cardiovascular: Negative for chest pain.   Gastrointestinal: Negative for abdominal distention and abdominal pain.   Skin: Negative for color change.   Neurological: Negative for tremors, speech difficulty and headaches.   Psychiatric/Behavioral: Negative for agitation and confusion.   All other systems reviewed and are negative.        Current Outpatient Prescriptions:   •  HYDROcodone-acetaminophen (NORCO)  MG per tablet, , Disp: , Rfl:   •  levothyroxine (SYNTHROID, LEVOTHROID) 50 MCG tablet, Take 1 tab po qam fasting, wait 1hr for food or other meds. BRAND ONLY, Disp: 30 tablet, Rfl: 1  •  meloxicam (MOBIC) 15 MG tablet, Take 1 tablet by mouth Daily. Prn pain or inflammation, Disp: 30 tablet, Rfl: 1  •  varenicline (CHANTIX CONTINUING MONTH VANDANA) 1 MG tablet, Take 1 tablet by mouth 2 (Two) Times a Day., Disp: 60 tablet, Rfl: 0  •  varenicline (CHANTIX STARTING MONTH VANDANA) 0.5 MG X 11 & 1 MG X 42 tablet, Take 0.5 mg one daily on days 1-3 and 0.5 mg twice  "daily on days 4-7. Then 1 mg twice daily for a total of 4 weeks., Disp: 53 tablet, Rfl: 0    Objective     /88   Temp 97.4 °F (36.3 °C)   Ht 163.8 cm (64.5\")   Wt 84.5 kg (186 lb 3.2 oz)   BMI 31.47 kg/m²     Physical Exam   Constitutional: She is oriented to person, place, and time. She appears well-developed and well-nourished.   HENT:   Right Ear: Tympanic membrane and ear canal normal.   Left Ear: Tympanic membrane and ear canal normal.   Mouth/Throat: Oropharynx is clear and moist.   Eyes: Conjunctivae and EOM are normal. Pupils are equal, round, and reactive to light.   Neck: No thyromegaly present.   Cardiovascular: Normal rate and regular rhythm.    Pulmonary/Chest: Effort normal and breath sounds normal.   Neurological: She is alert and oriented to person, place, and time.   Skin: Skin is warm and dry.   Psychiatric: She has a normal mood and affect.   Vitals reviewed.      Assessment/Plan   Vish was seen today for follow-up.    Diagnoses and all orders for this visit:    Goiter diffuse, nontoxic  -     levothyroxine (SYNTHROID, LEVOTHROID) 50 MCG tablet; Take 1 tab po qam fasting, wait 1hr for food or other meds. BRAND ONLY  -     TSH  -     T4, Free    Smoking trying to quit  -     varenicline (CHANTIX STARTING MONTH VANDANA) 0.5 MG X 11 & 1 MG X 42 tablet; Take 0.5 mg one daily on days 1-3 and 0.5 mg twice daily on days 4-7. Then 1 mg twice daily for a total of 4 weeks.  -     varenicline (CHANTIX CONTINUING MONTH VANDANA) 1 MG tablet; Take 1 tablet by mouth 2 (Two) Times a Day.      1. ENDO- goiter- advised to add back her singulair but as the hoarseness is getting better, I don't really think this is allergies. Will check there TSH and free T4 today but will go ahead and increase her dose of synthroid from 25 to 50.   2. RESP- will start chantix again now. Will write for a starter pack and the 1st month of the continuing pack. Additional RF at recheck.   3. RECHECK- 6wk       "

## 2018-04-24 NOTE — PATIENT INSTRUCTIONS
1. ENDO- goiter- advised to add back her singulair but as the hoarseness is getting better, I don't really think this is allergies. Will check there TSH and free T4 today but will go ahead and increase her dose of synthroid from 25 to 50.   2. RESP- will start chantix again now. Will write for a starter pack and the 1st month of the continuing pack. Additional RF at recheck.   3. RECHECK- 6wk

## 2018-06-07 ENCOUNTER — OFFICE VISIT (OUTPATIENT)
Dept: INTERNAL MEDICINE | Facility: CLINIC | Age: 53
End: 2018-06-07

## 2018-06-07 VITALS
WEIGHT: 190 LBS | BODY MASS INDEX: 31.65 KG/M2 | SYSTOLIC BLOOD PRESSURE: 122 MMHG | HEIGHT: 65 IN | DIASTOLIC BLOOD PRESSURE: 78 MMHG | TEMPERATURE: 97.3 F

## 2018-06-07 DIAGNOSIS — Z72.0 SMOKING TRYING TO QUIT: ICD-10-CM

## 2018-06-07 DIAGNOSIS — E04.0 GOITER DIFFUSE, NONTOXIC: Primary | ICD-10-CM

## 2018-06-07 DIAGNOSIS — E04.0 GOITER DIFFUSE, NONTOXIC: ICD-10-CM

## 2018-06-07 LAB
T4 FREE SERPL-MCNC: 1.6 NG/DL (ref 0.89–1.76)
TSH SERPL DL<=0.05 MIU/L-ACNC: 0.75 MIU/ML (ref 0.35–5.35)

## 2018-06-07 PROCEDURE — 84443 ASSAY THYROID STIM HORMONE: CPT | Performed by: FAMILY MEDICINE

## 2018-06-07 PROCEDURE — 84439 ASSAY OF FREE THYROXINE: CPT | Performed by: FAMILY MEDICINE

## 2018-06-07 PROCEDURE — 99213 OFFICE O/P EST LOW 20 MIN: CPT | Performed by: FAMILY MEDICINE

## 2018-06-07 RX ORDER — VARENICLINE TARTRATE 1 MG/1
1 TABLET, FILM COATED ORAL 2 TIMES DAILY
Qty: 60 TABLET | Refills: 3 | Status: SHIPPED | OUTPATIENT
Start: 2018-06-07 | End: 2018-09-18 | Stop reason: SDUPTHER

## 2018-06-07 RX ORDER — LIOTHYRONINE SODIUM 5 UG/1
5 TABLET ORAL DAILY
Qty: 30 TABLET | Refills: 1 | Status: SHIPPED | OUTPATIENT
Start: 2018-06-07 | End: 2018-08-14 | Stop reason: SDUPTHER

## 2018-06-07 RX ORDER — LEVOTHYROXINE SODIUM 0.05 MG/1
TABLET ORAL
Qty: 30 TABLET | Refills: 1 | Status: SHIPPED | OUTPATIENT
Start: 2018-06-07 | End: 2018-08-20 | Stop reason: SDUPTHER

## 2018-06-07 NOTE — PROGRESS NOTES
Subjective   Vish Russo is a 53 y.o. female.     History of Present Illness   Here for 6wk recheck goiter and chantix. Last seen 4/24/18 and 2/20/18 for goiter.  Was seen 7/11/16 for CPE and mood. Lab 4/24/18 demo TSH 0.37 and free T4 1.46 on synthroid 25. Lab 2/13/18 demo TSH 0.253, free T4 1.35, neg thyroid abs. US demo multinodular goiter. Had labs 7/11/16 with normal CBC, CMP, TSH and B12. Vit D was 22. Lipid demo , tg 219, HDL 67, . Lab 8/16/16 demo , tg 127, HDL 47,  on Lipitor 10.     1.CV- hyperlipid. Pt stopped all meds including Lipitor.   2. PSYCH- depression with anxiety- at her initial visit pt reported a h/o inadequate care (was seen by someone impersonating a doctor) and had been afraid to go to a doctor since then. Was having sad, irritable and anxious mood with guilt, anhedonia, feeling overwhelmed, fatigue, withdrawing, decreased motivation, crying, memory/ concentration problems, palpitations and insomnia. Also had manic episodes. Was started on Cymbalta with Seroquel initially but eventually c/o feeling over sedated. Did not do well with abilify and had had swelling in past with Zyprexa. Was changed to vraylar (continued on Cymbalta). Pt then stopped all meds. Was working on lifestyle but still cried easily. Advised we would reassess after thyroid addressed.  3. ORTHO- right CTS. Pt using a brace and doing stretches but worsening and was referred to the hand specialist. Pt was then seen 9/8/16 with a fall with multiple injuries. Had been to ER and had xray of left wrist, hip, knee and ankle; neg other than moderate OA. Pt went to [facility] with MRI of her foot and knee. Did not respond to shot in right foot and left knee. Then had arthroscopy on her left knee and neuroma resection left foot. Did well but has continued to have left shoulder pain, likely related to working as a . Was restarted on Mobic.      4. ENDO- goiter. Found on exam. Pt c/o  hoarseness, fatigue, weight gain, dry skin, brittle nails, constipation, etc. Had slightly suppressed TSH, normal free T4 and US that demo multiple nodules, none predominant. Was seen with hyperpigmentation around her neck. Decision made to do trial with low dose synthroid 25. Pt felt her hoarseness was improving, no change in other symptoms. TSH was no longer suppressed and free T4 was still normal. Pt was increased to synthroid 50. Today she reports she still has symptoms of low thyroid, no symptoms of being too high. Is dieting and still gaining weight, is still hoarse.       5. RESP- intermittent hoarseness, likely related to allergies. Treated with singulair, Claritin and Flonase. Quit smoking with Chantix but then restarted 2016 when got very stressed. Hoarseness was improving with thyroid treatment but pt was having PND with difficulty swallowing and pot was advised to restart singulair with the Claritin and flonase. Was also started on Chantix. Today pt reports she is doing well with the chantix. Stopped smoking 5/14/18.    The following portions of the patient's history were reviewed and updated as appropriate: current medications, past family history, past medical history, past social history, past surgical history and problem list.    Review of Systems   Cardiovascular: Negative for chest pain.   Gastrointestinal: Negative for abdominal distention and abdominal pain.   Skin: Negative for color change.   Neurological: Negative for tremors, speech difficulty and headaches.   Psychiatric/Behavioral: Negative for agitation and confusion.   All other systems reviewed and are negative.        Current Outpatient Prescriptions:   •  HYDROcodone-acetaminophen (NORCO)  MG per tablet, , Disp: , Rfl:   •  levothyroxine (SYNTHROID, LEVOTHROID) 50 MCG tablet, Take 1 tab po qam fasting, wait 1hr for food or other meds. BRAND ONLY, Disp: 30 tablet, Rfl: 1  •  meloxicam (MOBIC) 15 MG tablet, Take 1 tablet by mouth  "Daily. Prn pain or inflammation, Disp: 30 tablet, Rfl: 1  •  montelukast (SINGULAIR) 10 MG tablet, TAKE ONE TABLET BY MOUTH DAILY AS NEEDED FOR ALLERGIES, Disp: 30 tablet, Rfl: 2  •  varenicline (CHANTIX CONTINUING MONTH PAK) 1 MG tablet, Take 1 tablet by mouth 2 (Two) Times a Day., Disp: 60 tablet, Rfl: 3    Objective     /78   Temp 97.3 °F (36.3 °C)   Ht 163.8 cm (64.5\")   Wt 86.2 kg (190 lb)   BMI 32.11 kg/m²     Physical Exam   Constitutional: She is oriented to person, place, and time. She appears well-developed and well-nourished.   HENT:   Right Ear: Tympanic membrane and ear canal normal.   Left Ear: Tympanic membrane and ear canal normal.   Mouth/Throat: Oropharynx is clear and moist.   Eyes: Conjunctivae and EOM are normal. Pupils are equal, round, and reactive to light.   Neck: No thyromegaly present.   Cardiovascular: Normal rate and regular rhythm.    Pulmonary/Chest: Effort normal and breath sounds normal.   Neurological: She is alert and oriented to person, place, and time.   Skin: Skin is warm and dry.   Psychiatric: She has a normal mood and affect.   Vitals reviewed.      Assessment/Plan   Vish was seen today for follow-up.    Diagnoses and all orders for this visit:    Goiter diffuse, nontoxic  -     TSH  -     T4, Free    Smoking trying to quit  -     varenicline (CHANTIX CONTINUING MONTH VANDANA) 1 MG tablet; Take 1 tablet by mouth 2 (Two) Times a Day.        1. ENDO- goiter- will check a TSH and free T4 today. Will either increase the synthroid or will add cytomel. Education re cytomel provided today. Will not send RF until dose determined by lab. Samples today.  2. RESP- smoking, trying to quit- doing well with chantix. Will RF x5mo to complete the course. Discussed that finishing the full course is important to keep the cravings from returning when she stops the chantix.  3. RECHECK- 6wk       "

## 2018-06-07 NOTE — PATIENT INSTRUCTIONS
1. ENDO- goiter- will check a TSH and free T4 today. Will either increase the synthroid or will add cytomel. Education re cytomel provided today. Will not send RF until dose determined by lab. Samples today.  2. RESP- smoking, trying to quit- doing well with chantix. Will RF x5mo to complete the course. Discussed that finishing the full course is important to keep the cravings from returning when she stops the chantix.  3. RECHECK- 6wk

## 2018-07-24 RX ORDER — BACLOFEN 20 MG/1
TABLET ORAL
Qty: 60 TABLET | Refills: 2 | Status: SHIPPED | OUTPATIENT
Start: 2018-07-24 | End: 2019-01-08 | Stop reason: SDUPTHER

## 2018-08-14 DIAGNOSIS — E04.0 GOITER DIFFUSE, NONTOXIC: ICD-10-CM

## 2018-08-14 RX ORDER — LIOTHYRONINE SODIUM 5 UG/1
TABLET ORAL
Qty: 30 TABLET | Refills: 0 | Status: SHIPPED | OUTPATIENT
Start: 2018-08-14 | End: 2018-08-20 | Stop reason: SDUPTHER

## 2018-08-20 ENCOUNTER — OFFICE VISIT (OUTPATIENT)
Dept: INTERNAL MEDICINE | Facility: CLINIC | Age: 53
End: 2018-08-20

## 2018-08-20 VITALS
WEIGHT: 197.6 LBS | BODY MASS INDEX: 32.92 KG/M2 | SYSTOLIC BLOOD PRESSURE: 122 MMHG | TEMPERATURE: 98 F | HEIGHT: 65 IN | DIASTOLIC BLOOD PRESSURE: 74 MMHG

## 2018-08-20 DIAGNOSIS — E04.0 GOITER DIFFUSE, NONTOXIC: Primary | ICD-10-CM

## 2018-08-20 PROBLEM — Z87.891 QUIT SMOKING WITHIN PAST YEAR: Status: ACTIVE | Noted: 2018-06-07

## 2018-08-20 PROCEDURE — 99213 OFFICE O/P EST LOW 20 MIN: CPT | Performed by: FAMILY MEDICINE

## 2018-08-20 RX ORDER — LIOTHYRONINE SODIUM 5 UG/1
5 TABLET ORAL DAILY
Qty: 30 TABLET | Refills: 5 | Status: SHIPPED | OUTPATIENT
Start: 2018-08-20 | End: 2018-08-20 | Stop reason: SDUPTHER

## 2018-08-20 RX ORDER — TOPIRAMATE 25 MG/1
TABLET ORAL
Qty: 60 TABLET | Refills: 0 | Status: SHIPPED | OUTPATIENT
Start: 2018-08-20 | End: 2018-09-18 | Stop reason: SDUPTHER

## 2018-08-20 RX ORDER — LIOTHYRONINE SODIUM 5 UG/1
5 TABLET ORAL DAILY
Qty: 30 TABLET | Refills: 5 | Status: SHIPPED | OUTPATIENT
Start: 2018-08-20 | End: 2019-08-20

## 2018-08-20 RX ORDER — TOPIRAMATE 25 MG/1
TABLET ORAL
Qty: 60 TABLET | Refills: 0 | Status: SHIPPED | OUTPATIENT
Start: 2018-08-20 | End: 2018-08-20 | Stop reason: SDUPTHER

## 2018-08-20 RX ORDER — LEVOTHYROXINE SODIUM 0.05 MG/1
TABLET ORAL
Qty: 30 TABLET | Refills: 5 | Status: SHIPPED | OUTPATIENT
Start: 2018-08-20 | End: 2018-08-20 | Stop reason: SDUPTHER

## 2018-08-20 RX ORDER — LEVOTHYROXINE SODIUM 0.05 MG/1
TABLET ORAL
Qty: 30 TABLET | Refills: 5 | Status: SHIPPED | OUTPATIENT
Start: 2018-08-20 | End: 2019-04-22 | Stop reason: SDUPTHER

## 2018-08-20 NOTE — PROGRESS NOTES
Subjective   Vish Russo is a 53 y.o. female.     History of Present Illness   Here for 6wk recheck thyroid. Last seen 6/7/18 for recheck goiter and chantix. Was seen 7/11/16 for CPE and mood. Lab 6/7/18 demo TSH 0.748 and free T4 1.6 on synthroid 50. Lab 4/24/18 demo TSH 0.37 and free T4 1.46 on synthroid 25. Lab 2/13/18 demo TSH 0.253, free T4 1.35, neg thyroid abs. US demo multinodular goiter. Had labs 7/11/16 with normal CBC, CMP, TSH and B12. Vit D was 22. Lipid demo , tg 219, HDL 67, . Lab 8/16/16 demo , tg 127, HDL 47,  on Lipitor 10.     1.CV- hyperlipid. Pt stopped all meds including Lipitor.   2. PSYCH- depression with anxiety- at her initial visit pt reported a h/o inadequate care (was seen by someone impersonating a doctor) and had been afraid to go to a doctor since then. Was having sad, irritable and anxious mood with guilt, anhedonia, feeling overwhelmed, fatigue, withdrawing, decreased motivation, crying, memory/ concentration problems, palpitations and insomnia. Also had manic episodes. Was started on Cymbalta with Seroquel initially but eventually c/o feeling over sedated. Did not do well with abilify and had had swelling in past with Zyprexa. Was changed to vraylar (continued on Cymbalta). Pt then stopped all meds. Was working on lifestyle but still cried easily. Advised we would reassess after thyroid addressed.  3. ORTHO- right CTS. Pt using a brace and doing stretches but worsening and was referred to the hand specialist. Pt was then seen 9/8/16 with a fall with multiple injuries. Had been to ER and had xray of left wrist, hip, knee and ankle; neg other than moderate OA. Pt went to [facility] with MRI of her foot and knee. Did not respond to shot in right foot and left knee. Then had arthroscopy on her left knee and neuroma resection left foot. Did well but has continued to have left shoulder pain, likely related to working as a . Was restarted on Mobic.    4. RESP- intermittent hoarseness, likely related to allergies. Treated with singulair, Claritin and Flonase. Quit smoking with Chantix but then restarted 2016 when got very stressed. Hoarseness was improving with thyroid treatment but pt was having PND with difficulty swallowing and pot was advised to restart singulair with the Claritin and flonase. Was also started on Chantix and stopped smoking 5/14/18.    5. ENDO- goiter. Found on exam. Pt c/o hoarseness, fatigue, weight gain, dry skin, brittle nails, constipation, etc. Had slightly suppressed TSH, normal free T4 and US that demo multiple nodules, none predominant. Was seen with hyperpigmentation around her neck. Was tried on synthroid and TSH normalized. Was titreated up to synthroid 50, labs still normal. Still had symptoms (gaining weight, hoarse). Was given trial addition of cytomel 5. Today pt reports she feels well now. Is sleeping well. Is concerned re her weight. Eats healthy but eats too much.    The following portions of the patient's history were reviewed and updated as appropriate: current medications, past family history, past medical history, past social history, past surgical history and problem list.    Review of Systems   Cardiovascular: Negative for chest pain.   Gastrointestinal: Negative for abdominal distention and abdominal pain.   Skin: Negative for color change.   Neurological: Negative for tremors, speech difficulty and headaches.   Psychiatric/Behavioral: Negative for agitation and confusion.   All other systems reviewed and are negative.        Current Outpatient Prescriptions:   •  baclofen (LIORESAL) 20 MG tablet, TAKE ONE TABLET BY MOUTH TWICE A DAY, Disp: 60 tablet, Rfl: 2  •  HYDROcodone-acetaminophen (NORCO)  MG per tablet, , Disp: , Rfl:   •  levothyroxine (SYNTHROID, LEVOTHROID) 50 MCG tablet, Take 1 tab po qam fasting, wait 1hr for food or other meds. BRAND ONLY, Disp: 30 tablet, Rfl: 5  •  liothyronine (CYTOMEL) 5 MCG  "tablet, Take 1 tablet by mouth Daily., Disp: 30 tablet, Rfl: 5  •  meloxicam (MOBIC) 15 MG tablet, Take 1 tablet by mouth Daily. Prn pain or inflammation, Disp: 30 tablet, Rfl: 1  •  montelukast (SINGULAIR) 10 MG tablet, TAKE ONE TABLET BY MOUTH DAILY AS NEEDED FOR ALLERGIES, Disp: 30 tablet, Rfl: 2  •  topiramate (TOPAMAX) 25 MG tablet, 1 tab po qd x1wk then 2 tabs po qd, Disp: 60 tablet, Rfl: 0  •  varenicline (CHANTIX CONTINUING MONTH VANDANA) 1 MG tablet, Take 1 tablet by mouth 2 (Two) Times a Day., Disp: 60 tablet, Rfl: 3    Objective     /74   Temp 98 °F (36.7 °C)   Ht 163.8 cm (64.5\")   Wt 89.6 kg (197 lb 9.6 oz)   BMI 33.39 kg/m²     Physical Exam   Constitutional: She is oriented to person, place, and time. She appears well-developed and well-nourished.   HENT:   Right Ear: Tympanic membrane and ear canal normal.   Left Ear: Tympanic membrane and ear canal normal.   Mouth/Throat: Oropharynx is clear and moist.   Eyes: Pupils are equal, round, and reactive to light. Conjunctivae and EOM are normal.   Neck: No thyromegaly present.   Cardiovascular: Normal rate and regular rhythm.    Pulmonary/Chest: Effort normal and breath sounds normal.   Neurological: She is alert and oriented to person, place, and time.   Skin: Skin is warm and dry.   Psychiatric: She has a normal mood and affect.   Vitals reviewed.      Assessment/Plan   Vish was seen today for follow-up.    Diagnoses and all orders for this visit:    Goiter diffuse, nontoxic  -     Discontinue: liothyronine (CYTOMEL) 5 MCG tablet; Take 1 tablet by mouth Daily.  -     Discontinue: levothyroxine (SYNTHROID, LEVOTHROID) 50 MCG tablet; Take 1 tab po qam fasting, wait 1hr for food or other meds. BRAND ONLY  -     Discontinue: topiramate (TOPAMAX) 25 MG tablet; 1 tab po qd x1wk then 2 tabs po qd  -     levothyroxine (SYNTHROID, LEVOTHROID) 50 MCG tablet; Take 1 tab po qam fasting, wait 1hr for food or other meds. BRAND ONLY  -     liothyronine " (CYTOMEL) 5 MCG tablet; Take 1 tablet by mouth Daily.  -     topiramate (TOPAMAX) 25 MG tablet; 1 tab po qd x1wk then 2 tabs po qd        1. ENDO- goiter- thyroid clinically at goal on synthroid 50 with cytomel 5. RF now. Will now address the associated weight gain. Will do trial with topamax. Discussed diet with [employer] handout provided. Discussed reducing her calories.  2. RECHECK- 1mo topamax

## 2018-08-20 NOTE — PATIENT INSTRUCTIONS
1. ENDO- goiter- thyroid clinically at goal on synthroid 50 with cytomel 5. RF now. Will now address the associated weight gain. Will do trial with topamax. Discussed diet with [employer] handout provided. Discussed reducing her calories.  2. RECHECK- 1mo topamax

## 2018-09-18 ENCOUNTER — OFFICE VISIT (OUTPATIENT)
Dept: INTERNAL MEDICINE | Facility: CLINIC | Age: 53
End: 2018-09-18

## 2018-09-18 VITALS
HEIGHT: 65 IN | DIASTOLIC BLOOD PRESSURE: 76 MMHG | BODY MASS INDEX: 32.42 KG/M2 | TEMPERATURE: 97.8 F | WEIGHT: 194.6 LBS | SYSTOLIC BLOOD PRESSURE: 118 MMHG

## 2018-09-18 DIAGNOSIS — Z72.0 SMOKING TRYING TO QUIT: ICD-10-CM

## 2018-09-18 DIAGNOSIS — F41.8 DEPRESSION WITH ANXIETY: ICD-10-CM

## 2018-09-18 DIAGNOSIS — E78.00 PURE HYPERCHOLESTEROLEMIA: Primary | ICD-10-CM

## 2018-09-18 LAB
CHOLEST SERPL-MCNC: 205 MG/DL
EXPIRATION DATE: ABNORMAL
HDLC SERPL-MCNC: 36 MG/DL
LDLC SERPL CALC-MCNC: 105 MG/DL
Lab: ABNORMAL
TRIGL SERPL-MCNC: 322 MG/DL

## 2018-09-18 PROCEDURE — 80061 LIPID PANEL: CPT | Performed by: FAMILY MEDICINE

## 2018-09-18 PROCEDURE — 99213 OFFICE O/P EST LOW 20 MIN: CPT | Performed by: FAMILY MEDICINE

## 2018-09-18 RX ORDER — VARENICLINE TARTRATE 1 MG/1
1 TABLET, FILM COATED ORAL 2 TIMES DAILY
Qty: 60 TABLET | Refills: 0 | Status: SHIPPED | OUTPATIENT
Start: 2018-09-18 | End: 2019-02-04 | Stop reason: SDUPTHER

## 2018-09-18 RX ORDER — TOPIRAMATE 100 MG/1
TABLET, FILM COATED ORAL
Qty: 30 TABLET | Refills: 0 | Status: SHIPPED | OUTPATIENT
Start: 2018-09-18 | End: 2018-10-18 | Stop reason: SDUPTHER

## 2018-09-18 NOTE — PATIENT INSTRUCTIONS
1. CV- hyperlipid- will do an in house lipid today with pt's thyroid at goal. , tg 322, HDL 36, . No statin indicated.   2. PSYCH- mood with weight issues- improving with topamax. Will increase to 100mg. Also discussed alternate diet options such as the Paleo diet.   3. RSP- needs 1 RF chantix to finish the 6mo series.   4. RECHECK- 1mo

## 2018-09-18 NOTE — PROGRESS NOTES
Subjective   Vish Russo is a 53 y.o. female.     History of Present Illness   Here for 1mo recheck weight. Last seen 8/20/18 for 6wk recheck thyroid. Last seen 6/7/18 for recheck goiter and chantix. Was seen 7/11/16 for CPE and mood. Lab 6/7/18 demo TSH 0.748 and free T4 1.6 on synthroid 50. Lab 4/24/18 demo TSH 0.37 and free T4 1.46 on synthroid 25. Lab 2/13/18 demo TSH 0.253, free T4 1.35, neg thyroid abs. US demo multinodular goiter. Had labs 7/11/16 with normal CBC, CMP, TSH and B12. Vit D was 22. Lipid demo , tg 219, HDL 67, . Lab 8/16/16 demo , tg 127, HDL 47,  on Lipitor 10.     1.CV- hyperlipid. Pt stopped all meds including Lipitor. Thyroid now at goal; needs recheck lipid. Today pt reports she had turkey sausage today.     2. PSYCH- depression with anxiety- at her initial visit pt reported a h/o inadequate care (was seen by someone impersonating a doctor) and had been afraid to go to a doctor since then. Was having sad, irritable and anxious mood with guilt, anhedonia, feeling overwhelmed, fatigue, withdrawing, decreased motivation, crying, memory/ concentration problems, palpitations and insomnia; had had manic episodes. Was started on Cymbalta with Seroquel but felt oversedated. Did not tolerate abilify, Zyprexa or vraylar. Pt then stopped all meds. Is now treated for thyroid. Improved except for weight; was 197.5 lb. Was started on Topamax. Today she reports no undesired S/E with topamax. She has had taste perversion with soda tasting bad and veggies tasting better. It helped suppress her appetite only for the 2nd week. Is eating the Keto diet. Has lost 3 lb to 193.5 lb.     3. ORTHO- right CTS. Pt using a brace and doing stretches but worsening and was referred to the hand specialist. Pt was then seen 9/8/16 with a fall with multiple injuries. Had been to ER and had xray of left wrist, hip, knee and ankle; neg other than moderate OA. Pt went to [facility] with MRI of her  foot and knee. Did not respond to shot in right foot and left knee. Then had arthroscopy on her left knee and neuroma resection left foot. Did well but has continued to have left shoulder pain, likely related to working as a . Was restarted on Mobic.   4. RESP- intermittent hoarseness, likely related to allergies. Treated with singulair, Claritin and Flonase. Quit smoking with Chantix but then restarted 2016 when got very stressed. Hoarseness was improving with thyroid treatment but pt was having PND with difficulty swallowing and pot was advised to restart singulair with the Claritin and flonase. Was also started on Chantix and stopped smoking 5/14/18.  5. ENDO- goiter. Found on exam. Pt c/o hoarseness, fatigue, weight gain, dry skin, brittle nails, rash and constipation. Had slightly suppressed TSH, normal free T4 and US that demo multiple nodules, none predominant. Was seen with hyperpigmentation around her neck. Was tried on synthroid and TSH normalized. Was titreated up to synthroid 50 with eventual addition of cytomel 5 and pt reached goal clinically at visit 8/20/18 on this combo.     The following portions of the patient's history were reviewed and updated as appropriate: current medications, past family history, past medical history, past social history, past surgical history and problem list.    Review of Systems   Cardiovascular: Negative for chest pain.   Gastrointestinal: Negative for abdominal distention and abdominal pain.   Skin: Negative for color change.   Neurological: Negative for tremors, speech difficulty and headaches.   Psychiatric/Behavioral: Negative for agitation and confusion.   All other systems reviewed and are negative.        Current Outpatient Prescriptions:   •  baclofen (LIORESAL) 20 MG tablet, TAKE ONE TABLET BY MOUTH TWICE A DAY, Disp: 60 tablet, Rfl: 2  •  HYDROcodone-acetaminophen (NORCO)  MG per tablet, , Disp: , Rfl:   •  levothyroxine (SYNTHROID,  "LEVOTHROID) 50 MCG tablet, Take 1 tab po qam fasting, wait 1hr for food or other meds. BRAND ONLY, Disp: 30 tablet, Rfl: 5  •  liothyronine (CYTOMEL) 5 MCG tablet, Take 1 tablet by mouth Daily., Disp: 30 tablet, Rfl: 5  •  meloxicam (MOBIC) 15 MG tablet, Take 1 tablet by mouth Daily. Prn pain or inflammation, Disp: 30 tablet, Rfl: 1  •  montelukast (SINGULAIR) 10 MG tablet, TAKE ONE TABLET BY MOUTH DAILY AS NEEDED FOR ALLERGIES, Disp: 30 tablet, Rfl: 2  •  topiramate (TOPAMAX) 100 MG tablet, 1 tab po qd, Disp: 30 tablet, Rfl: 0  •  varenicline (CHANTIX CONTINUING MONTH PAK) 1 MG tablet, Take 1 tablet by mouth 2 (Two) Times a Day., Disp: 60 tablet, Rfl: 0    Objective     /76   Temp 97.8 °F (36.6 °C)   Ht 163.8 cm (64.5\")   Wt 88.3 kg (194 lb 9.6 oz)   BMI 32.89 kg/m²     Physical Exam   Constitutional: She is oriented to person, place, and time. She appears well-developed and well-nourished.   HENT:   Right Ear: Tympanic membrane and ear canal normal.   Left Ear: Tympanic membrane and ear canal normal.   Mouth/Throat: Oropharynx is clear and moist.   Eyes: Pupils are equal, round, and reactive to light. Conjunctivae and EOM are normal.   Neck: No thyromegaly present.   Cardiovascular: Normal rate and regular rhythm.    Pulmonary/Chest: Effort normal and breath sounds normal.   Neurological: She is alert and oriented to person, place, and time.   Skin: Skin is warm and dry.   Psychiatric: She has a normal mood and affect.   Vitals reviewed.      Assessment/Plan   Vish was seen today for follow-up.    Diagnoses and all orders for this visit:    Pure hypercholesterolemia  -     POC Lipid Panel    Depression with anxiety  -     topiramate (TOPAMAX) 100 MG tablet; 1 tab po qd    Smoking trying to quit  -     varenicline (CHANTIX CONTINUING MONTH PAK) 1 MG tablet; Take 1 tablet by mouth 2 (Two) Times a Day.      1. CV- hyperlipid- will do an in house lipid today with pt's thyroid at goal. , tg 322, HDL " 36, . No statin indicated.   2. PSYCH- mood with weight issues- improving with topamax. Will increase to 100mg. Also discussed alternate diet options such as the Paleo diet.   3. RSP- needs 1 RF chantix to finish the 6mo series.   4. RECHECK- 1mo

## 2018-10-18 ENCOUNTER — OFFICE VISIT (OUTPATIENT)
Dept: INTERNAL MEDICINE | Facility: CLINIC | Age: 53
End: 2018-10-18

## 2018-10-18 VITALS
BODY MASS INDEX: 32.32 KG/M2 | HEIGHT: 65 IN | SYSTOLIC BLOOD PRESSURE: 122 MMHG | WEIGHT: 194 LBS | TEMPERATURE: 97.9 F | DIASTOLIC BLOOD PRESSURE: 76 MMHG

## 2018-10-18 DIAGNOSIS — E04.0 GOITER DIFFUSE, NONTOXIC: Primary | ICD-10-CM

## 2018-10-18 DIAGNOSIS — F41.8 DEPRESSION WITH ANXIETY: ICD-10-CM

## 2018-10-18 PROCEDURE — 99214 OFFICE O/P EST MOD 30 MIN: CPT | Performed by: FAMILY MEDICINE

## 2018-10-18 RX ORDER — CEPHALEXIN 500 MG/1
CAPSULE ORAL
COMMUNITY
Start: 2018-10-14 | End: 2018-10-18

## 2018-10-18 RX ORDER — BUPROPION HYDROCHLORIDE 150 MG/1
150 TABLET ORAL DAILY
Qty: 30 TABLET | Refills: 0 | Status: SHIPPED | OUTPATIENT
Start: 2018-10-18 | End: 2018-11-15 | Stop reason: SDUPTHER

## 2018-10-18 NOTE — PATIENT INSTRUCTIONS
1. ENDO- hypothyroid- will not change her dose of synthroid or cytomel at this time but will keep this in mind as an option  2. PSYCH- depression with anxiety, fatigue- will increase the topamax to 200mg to try to get best control of appetite. After 1wk pt will then add wellbutrin 150 to address mood, fatigue and appetite. Discussed ways to address her boredom.   3. RECHECK- 1mo CPE and recheck

## 2018-10-18 NOTE — PROGRESS NOTES
Subjective   Vish Russo is a 53 y.o. female.     History of Present Illness   Here for 1mo recheck on Topamax (weight). Last seen 9/18/18 for same. Was seen 8/20/18 for 6wk recheck thyroid. Was seen 7/11/16 for CPE and mood. Lab 9/18/18 demo , tg 322, HDL 36, . Lab 6/7/18 demo TSH 0.748 and free T4 1.6 on synthroid 50. Lab 4/24/18 demo TSH 0.37 and free T4 1.46 on synthroid 25. Lab 2/13/18 demo TSH 0.253, free T4 1.35, neg thyroid abs. US demo multinodular goiter. Had labs 7/11/16 with normal CBC, CMP, TSH and B12. Vit D was 22. Lipid demo , tg 219, HDL 67, . Lab 8/16/16 demo , tg 127, HDL 47,  on Lipitor 10.     1.PSYCH- depression with anxiety- at her initial visit pt reported a h/o inadequate care (was seen by someone impersonating a doctor) and had been afraid to go to a doctor since then. Was having sad, irritable and anxious mood with guilt, anhedonia, feeling overwhelmed, fatigue, withdrawing, decreased motivation, crying, memory/ concentration problems, palpitations and insomnia; had had manic episodes. Was started on Cymbalta with Seroquel but felt oversedated. Did not tolerate abilify, Zyprexa or vraylar. Pt then stopped all meds and mood improved with thyroid treatment. Was still having weight issues. Baseline weight was 197.5 lb. Was started on Topamax and had appropriate taste perversion but her appetite came back after 2wk. Was using the Keto diet and had lost 3 lb to 193.5 lb. Was increased on Topamax from 50 to 100mg. Today she reports she still has the taste perversion (cannot drink pepsi now) but still has issues with appetite, no worse but no better. No unwanted S/E. Is concerned that she is getting depressed with an overwhelming sense of sadness and return of the above symptoms. 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 Plans to start school 1/2019.    2. ENDO- goiter. Found on exam. Pt c/o hoarseness, fatigue, weight gain, dry skin, brittle nails,  rash and constipation. Had slightly suppressed TSH, normal free T4 and US that demo multiple nodules, none predominant. Was seen with hyperpigmentation around her neck. Was tried on synthroid and TSH normalized. Was titreated up to synthroid 50 with eventual addition of cytomel 5 and pt reached goal clinically at visit 8/20/18 on this combo. Today she reports she is feeling tired again.    3. CV- hyperlipid. Pt was on Lipitor. Stopped it but lipid improved with diet and weight loss with last lipid done 9/18/18 with .   4. ORTHO- right CTS. Pt using a brace and doing stretches but worsening and was referred to the hand specialist. Pt was then seen 9/8/16 with a fall with multiple injuries. Had been to ER and had xray of left wrist, hip, knee and ankle; neg other than moderate OA. Pt went to [facility] with MRI of her foot and knee. Did not respond to shot in right foot and left knee. Then had arthroscopy on her left knee and neuroma resection left foot. Did well but has continued to have left shoulder pain, likely related to working as a . Was restarted on Mobic.   5. RESP- intermittent hoarseness, likely related to allergies. Treated with singulair, Claritin and Flonase. Quit smoking with Chantix but then restarted 2016 when got very stressed. Hoarseness was improving with thyroid treatment but pt was having PND with difficulty swallowing and pot was advised to restart singulair with the Claritin and flonase. Was also started on Chantix and stopped smoking 5/14/18.    The following portions of the patient's history were reviewed and updated as appropriate: current medications, past family history, past medical history, past social history, past surgical history and problem list.    Review of Systems   Cardiovascular: Negative for chest pain.   Gastrointestinal: Negative for abdominal distention and abdominal pain.   Skin: Negative for color change.   Neurological: Negative for tremors, speech  "difficulty and headaches.   Psychiatric/Behavioral: Negative for agitation and confusion.   All other systems reviewed and are negative.        Current Outpatient Prescriptions:   •  baclofen (LIORESAL) 20 MG tablet, TAKE ONE TABLET BY MOUTH TWICE A DAY, Disp: 60 tablet, Rfl: 2  •  buPROPion XL (WELLBUTRIN XL) 150 MG 24 hr tablet, Take 1 tablet by mouth Daily., Disp: 30 tablet, Rfl: 0  •  HYDROcodone-acetaminophen (NORCO)  MG per tablet, , Disp: , Rfl:   •  levothyroxine (SYNTHROID, LEVOTHROID) 50 MCG tablet, Take 1 tab po qam fasting, wait 1hr for food or other meds. BRAND ONLY, Disp: 30 tablet, Rfl: 5  •  liothyronine (CYTOMEL) 5 MCG tablet, Take 1 tablet by mouth Daily., Disp: 30 tablet, Rfl: 5  •  meloxicam (MOBIC) 15 MG tablet, Take 1 tablet by mouth Daily. Prn pain or inflammation, Disp: 30 tablet, Rfl: 1  •  montelukast (SINGULAIR) 10 MG tablet, TAKE ONE TABLET BY MOUTH DAILY AS NEEDED FOR ALLERGIES, Disp: 30 tablet, Rfl: 2  •  topiramate (TOPAMAX) 200 MG tablet, 1 tab po qd, Disp: 30 tablet, Rfl: 0  •  varenicline (CHANTIX CONTINUING MONTH VANDANA) 1 MG tablet, Take 1 tablet by mouth 2 (Two) Times a Day., Disp: 60 tablet, Rfl: 0    Objective     /76   Temp 97.9 °F (36.6 °C)   Ht 163.8 cm (64.5\")   Wt 88 kg (194 lb)   BMI 32.79 kg/m²     Physical Exam   Constitutional: She is oriented to person, place, and time. She appears well-developed and well-nourished.   HENT:   Right Ear: Tympanic membrane and ear canal normal.   Left Ear: Tympanic membrane and ear canal normal.   Mouth/Throat: Oropharynx is clear and moist.   Eyes: Pupils are equal, round, and reactive to light. Conjunctivae and EOM are normal.   Neck: No thyromegaly present.   Cardiovascular: Normal rate and regular rhythm.    Pulmonary/Chest: Effort normal and breath sounds normal.   Neurological: She is alert and oriented to person, place, and time.   Skin: Skin is warm and dry.   Psychiatric: She has a normal mood and affect.   Vitals " reviewed.      Assessment/Plan   Vish was seen today for follow-up.    Diagnoses and all orders for this visit:    Goiter diffuse, nontoxic    Depression with anxiety  -     buPROPion XL (WELLBUTRIN XL) 150 MG 24 hr tablet; Take 1 tablet by mouth Daily.  -     topiramate (TOPAMAX) 200 MG tablet; 1 tab po qd      1. ENDO- hypothyroid- will not change her dose of synthroid or cytomel at this time but will keep this in mind as an option  2. PSYCH- depression with anxiety, fatigue- will increase the topamax to 200mg to try to get best control of appetite. After 1wk pt will then add wellbutrin 150 to address mood, fatigue and appetite. Discussed ways to address her boredom.   3. RECHECK- 1mo CPE and recheck

## 2018-11-12 ENCOUNTER — TELEPHONE (OUTPATIENT)
Dept: INTERNAL MEDICINE | Facility: CLINIC | Age: 53
End: 2018-11-12

## 2018-11-13 ENCOUNTER — TELEPHONE (OUTPATIENT)
Dept: INTERNAL MEDICINE | Facility: CLINIC | Age: 53
End: 2018-11-13

## 2018-11-13 NOTE — TELEPHONE ENCOUNTER
Fidel, please call her a.s.a.p. At [phone]. She is out of all her meds and her car has broke down and she cant get here.

## 2018-11-13 NOTE — TELEPHONE ENCOUNTER
I spoke to pt who said that her car will not be fixed and ready until Thursday. Pt asking for appointment to only discuss her Wellbutrin as she was scheduled for physical. She will be leaving to go out of town early next week to care for her brother in [place] bc he had rotator cuff surgery. Pt scheduled for Thursday morning to just discuss Wellbutrin. Pt states that she will have enough medication to due until then.

## 2018-11-15 ENCOUNTER — OFFICE VISIT (OUTPATIENT)
Dept: INTERNAL MEDICINE | Facility: CLINIC | Age: 53
End: 2018-11-15

## 2018-11-15 VITALS
WEIGHT: 195 LBS | DIASTOLIC BLOOD PRESSURE: 74 MMHG | SYSTOLIC BLOOD PRESSURE: 118 MMHG | BODY MASS INDEX: 32.49 KG/M2 | HEIGHT: 65 IN | TEMPERATURE: 98.8 F

## 2018-11-15 DIAGNOSIS — F41.8 DEPRESSION WITH ANXIETY: Primary | ICD-10-CM

## 2018-11-15 PROCEDURE — 90471 IMMUNIZATION ADMIN: CPT | Performed by: FAMILY MEDICINE

## 2018-11-15 PROCEDURE — 99213 OFFICE O/P EST LOW 20 MIN: CPT | Performed by: FAMILY MEDICINE

## 2018-11-15 PROCEDURE — 90686 IIV4 VACC NO PRSV 0.5 ML IM: CPT | Performed by: FAMILY MEDICINE

## 2018-11-15 RX ORDER — BUSPIRONE HYDROCHLORIDE 10 MG/1
TABLET ORAL
Qty: 60 TABLET | Refills: 0 | Status: SHIPPED | OUTPATIENT
Start: 2018-11-15 | End: 2018-12-11 | Stop reason: SDUPTHER

## 2018-11-15 RX ORDER — BUPROPION HYDROCHLORIDE 300 MG/1
300 TABLET ORAL DAILY
Qty: 30 TABLET | Refills: 0 | Status: SHIPPED | OUTPATIENT
Start: 2018-11-15 | End: 2018-12-11 | Stop reason: SDUPTHER

## 2018-11-15 NOTE — PROGRESS NOTES
Subjective   Vish Russo is a 53 y.o. female.     History of Present Illness   Here for recheck mood/ weight. Had to cancel CPE due to car trouble and family emergency. Last seen 10/18/18 for 1mo recheck on Topamax (weight). Was seen 7/11/16 for CPE and mood. Lab 9/18/18 demo , tg 322, HDL 36, . Lab 6/7/18 demo TSH 0.748 and free T4 1.6 on synthroid 50. Lab 4/24/18 demo TSH 0.37 and free T4 1.46 on synthroid 25. Lab 2/13/18 demo TSH 0.253, free T4 1.35, neg thyroid abs. US demo multinodular goiter. Had labs 7/11/16 with normal CBC, CMP, TSH and B12. Vit D was 22. Lipid demo , tg 219, HDL 67, . Lab 8/16/16 demo , tg 127, HDL 47,  on Lipitor 10.     1.PSYCH- depression with anxiety- at her initial visit pt reported a h/o inadequate care (was seen by someone impersonating a doctor) and had been afraid to go to a doctor since then. Was having sad, irritable and anxious mood with guilt, anhedonia, feeling overwhelmed, fatigue, withdrawing, decreased motivation, crying, memory/ concentration problems, palpitations and insomnia; had had manic episodes. Was started on Cymbalta with Seroquel but felt oversedated. Did not tolerate abilify, Zyprexa or vraylar. Pt then stopped all meds and mood improved with thyroid treatment. Was still having weight issues. Baseline weight was 197.5 lb. Was started on Topamax and had appropriate taste perversion but her appetite came back after 2wk. Was using the Keto diet and had lost 3 lb to 193.5 lb. Has been titrated up on Topamax and then at last visit pt reported feeling very sad. Was further increased on Topamax to 200mg with the addition of Wellbutrin 150 at last visit. Today pt reports she is very stressed with needing to take care of ill family and her grandkids while her daughter is in [place]. Has not been able to eat well. Is having alt C/D, crying, is not sleeping well and gets anxious when she has to go to her mother's house. Has gained  1.5 lb.Feels the wellbutrin has taken the edge off the mood.     2. ENDO- goiter. Found on exam. Pt c/o hoarseness, fatigue, weight gain, dry skin, brittle nails, rash and constipation. Had slightly suppressed TSH, normal free T4 and US that demo multiple nodules, none predominant. Was seen with hyperpigmentation around her neck. Was tried on synthroid and TSH normalized. Was titreated up to synthroid 50 with eventual addition of cytomel 5 and pt reached goal clinically at visit 8/20/18 on this combo.   3. CV- hyperlipid. Pt was on Lipitor. Stopped it but lipid improved with diet and weight loss with last lipid done 9/18/18 with .   4. ORTHO- right CTS. Pt using a brace and doing stretches but worsening and was referred to the hand specialist. Pt was then seen 9/8/16 with a fall with multiple injuries. Had been to ER and had xray of left wrist, hip, knee and ankle; neg other than moderate OA. Pt went to [facility] with MRI of her foot and knee. Did not respond to shot in right foot and left knee. Then had arthroscopy on her left knee and neuroma resection left foot. Did well but has continued to have left shoulder pain, likely related to working as a . Was restarted on Mobic. Still has baclofen for prn use as well.  5. RESP- intermittent hoarseness, likely related to allergies. Treated with singulair, Claritin and Flonase. Quit smoking with Chantix but then restarted 2016 when got very stressed. Hoarseness was improving with thyroid treatment but pt was having PND with difficulty swallowing and pot was advised to restart singulair with the Claritin and flonase. Was also started on Chantix and stopped smoking 5/14/18.    The following portions of the patient's history were reviewed and updated as appropriate: current medications, past family history, past medical history, past social history, past surgical history and problem list.    Review of Systems   Cardiovascular: Negative for chest pain.  "  Gastrointestinal: Negative for abdominal distention and abdominal pain.   Skin: Negative for color change.   Neurological: Negative for tremors, speech difficulty and headaches.   Psychiatric/Behavioral: Negative for agitation and confusion.   All other systems reviewed and are negative.        Current Outpatient Medications:   •  baclofen (LIORESAL) 20 MG tablet, TAKE ONE TABLET BY MOUTH TWICE A DAY, Disp: 60 tablet, Rfl: 2  •  buPROPion XL (WELLBUTRIN XL) 300 MG 24 hr tablet, Take 1 tablet by mouth Daily., Disp: 30 tablet, Rfl: 0  •  busPIRone (BUSPAR) 10 MG tablet, 1/2- 1 tab po q8hr prn anxiety or irritable bowel. 1 tab hs prn sleep, Disp: 60 tablet, Rfl: 0  •  HYDROcodone-acetaminophen (NORCO)  MG per tablet, , Disp: , Rfl:   •  levothyroxine (SYNTHROID, LEVOTHROID) 50 MCG tablet, Take 1 tab po qam fasting, wait 1hr for food or other meds. BRAND ONLY, Disp: 30 tablet, Rfl: 5  •  liothyronine (CYTOMEL) 5 MCG tablet, Take 1 tablet by mouth Daily., Disp: 30 tablet, Rfl: 5  •  meloxicam (MOBIC) 15 MG tablet, Take 1 tablet by mouth Daily. Prn pain or inflammation, Disp: 30 tablet, Rfl: 1  •  montelukast (SINGULAIR) 10 MG tablet, TAKE ONE TABLET BY MOUTH DAILY AS NEEDED FOR ALLERGIES, Disp: 30 tablet, Rfl: 2  •  varenicline (CHANTIX CONTINUING MONTH VANDANA) 1 MG tablet, Take 1 tablet by mouth 2 (Two) Times a Day., Disp: 60 tablet, Rfl: 0    Objective     /74   Temp 98.8 °F (37.1 °C)   Ht 163.8 cm (64.5\")   Wt 88.5 kg (195 lb)   BMI 32.95 kg/m²     Physical Exam   Constitutional: She is oriented to person, place, and time. She appears well-developed and well-nourished.   HENT:   Right Ear: Tympanic membrane and ear canal normal.   Left Ear: Tympanic membrane and ear canal normal.   Mouth/Throat: Oropharynx is clear and moist.   Eyes: Conjunctivae and EOM are normal. Pupils are equal, round, and reactive to light.   Neck: No thyromegaly present.   Cardiovascular: Normal rate and regular rhythm. "   Pulmonary/Chest: Effort normal and breath sounds normal.   Neurological: She is alert and oriented to person, place, and time.   Skin: Skin is warm and dry.   Psychiatric: She has a normal mood and affect.   Vitals reviewed.      Assessment/Plan   Vish was seen today for follow-up.    Diagnoses and all orders for this visit:    Depression with anxiety  -     buPROPion XL (WELLBUTRIN XL) 300 MG 24 hr tablet; Take 1 tablet by mouth Daily.  -     busPIRone (BUSPAR) 10 MG tablet; 1/2- 1 tab po q8hr prn anxiety or irritable bowel. 1 tab hs prn sleep    Other orders  -     Fluarix/Fluzone/Afluria/FluLaval ([id number])      1. PSYCH- depression with anxiety- pt not losing weight. Discussed that this is likely related to her stress level. Will stop the topamax for now. Will increase the wellbutrin from 150 to 300mg to see if this helps with mood as well as appetite. Will also add buspar as needed. Discussed that this can help with sleep, IBS and situational anxiety.  2. RECHECK- 1mo CPE, recheck

## 2018-11-15 NOTE — PATIENT INSTRUCTIONS
1. PSYCH- depression with anxiety- pt not losing weight. Discussed that this is likely related to her stress level. Will stop the topamax for now. Will increase the wellbutrin from 150 to 300mg to see if this helps with mood as well as appetite. Will also add buspar as needed. Discussed that this can help with sleep, IBS and situational anxiety.  2. RECHECK- 1mo CPE, recheck

## 2018-12-11 ENCOUNTER — OFFICE VISIT (OUTPATIENT)
Dept: INTERNAL MEDICINE | Facility: CLINIC | Age: 53
End: 2018-12-11

## 2018-12-11 VITALS
SYSTOLIC BLOOD PRESSURE: 122 MMHG | TEMPERATURE: 97.5 F | BODY MASS INDEX: 33.09 KG/M2 | HEIGHT: 65 IN | WEIGHT: 198.6 LBS | DIASTOLIC BLOOD PRESSURE: 76 MMHG

## 2018-12-11 DIAGNOSIS — R10.32 LEFT LOWER QUADRANT PAIN: ICD-10-CM

## 2018-12-11 DIAGNOSIS — F41.8 DEPRESSION WITH ANXIETY: ICD-10-CM

## 2018-12-11 DIAGNOSIS — Z00.00 ANNUAL PHYSICAL EXAM: Primary | ICD-10-CM

## 2018-12-11 DIAGNOSIS — E04.0 GOITER DIFFUSE, NONTOXIC: ICD-10-CM

## 2018-12-11 LAB
25(OH)D3 SERPL-MCNC: 25.5 NG/ML
ALBUMIN SERPL-MCNC: 4.43 G/DL (ref 3.2–4.8)
ALBUMIN/GLOB SERPL: 2.8 G/DL (ref 1.5–2.5)
ALP SERPL-CCNC: 79 U/L (ref 25–100)
ALT SERPL W P-5'-P-CCNC: 30 U/L (ref 7–40)
ANION GAP SERPL CALCULATED.3IONS-SCNC: 8 MMOL/L (ref 3–11)
ARTICHOKE IGE QN: 201 MG/DL (ref 0–130)
AST SERPL-CCNC: 16 U/L (ref 0–33)
BASOPHILS # BLD AUTO: 0.07 10*3/MM3 (ref 0–0.2)
BASOPHILS NFR BLD AUTO: 0.8 % (ref 0–1)
BILIRUB SERPL-MCNC: 0.3 MG/DL (ref 0.3–1.2)
BUN BLD-MCNC: 13 MG/DL (ref 9–23)
BUN/CREAT SERPL: 15.3 (ref 7–25)
CALCIUM SPEC-SCNC: 9.5 MG/DL (ref 8.7–10.4)
CHLORIDE SERPL-SCNC: 106 MMOL/L (ref 99–109)
CHOLEST SERPL-MCNC: 278 MG/DL (ref 0–200)
CO2 SERPL-SCNC: 28 MMOL/L (ref 20–31)
CREAT BLD-MCNC: 0.85 MG/DL (ref 0.6–1.3)
DEPRECATED RDW RBC AUTO: 45.3 FL (ref 37–54)
EOSINOPHIL # BLD AUTO: 0.13 10*3/MM3 (ref 0–0.3)
EOSINOPHIL NFR BLD AUTO: 1.5 % (ref 0–3)
ERYTHROCYTE [DISTWIDTH] IN BLOOD BY AUTOMATED COUNT: 13.9 % (ref 11.3–14.5)
GFR SERPL CREATININE-BSD FRML MDRD: 70 ML/MIN/1.73
GLOBULIN UR ELPH-MCNC: 1.6 GM/DL
GLUCOSE BLD-MCNC: 98 MG/DL (ref 70–100)
HCT VFR BLD AUTO: 43 % (ref 34.5–44)
HDLC SERPL-MCNC: 58 MG/DL (ref 40–60)
HGB BLD-MCNC: 14.3 G/DL (ref 11.5–15.5)
IMM GRANULOCYTES # BLD: 0.05 10*3/MM3 (ref 0–0.03)
IMM GRANULOCYTES NFR BLD: 0.6 % (ref 0–0.6)
LYMPHOCYTES # BLD AUTO: 1.68 10*3/MM3 (ref 0.6–4.8)
LYMPHOCYTES NFR BLD AUTO: 18.8 % (ref 24–44)
MCH RBC QN AUTO: 29.5 PG (ref 27–31)
MCHC RBC AUTO-ENTMCNC: 33.3 G/DL (ref 32–36)
MCV RBC AUTO: 88.7 FL (ref 80–99)
MONOCYTES # BLD AUTO: 0.84 10*3/MM3 (ref 0–1)
MONOCYTES NFR BLD AUTO: 9.4 % (ref 0–12)
NEUTROPHILS # BLD AUTO: 6.18 10*3/MM3 (ref 1.5–8.3)
NEUTROPHILS NFR BLD AUTO: 68.9 % (ref 41–71)
PLATELET # BLD AUTO: 305 10*3/MM3 (ref 150–450)
PMV BLD AUTO: 10.7 FL (ref 6–12)
POTASSIUM BLD-SCNC: 4.6 MMOL/L (ref 3.5–5.5)
PROT SERPL-MCNC: 6 G/DL (ref 5.7–8.2)
RBC # BLD AUTO: 4.85 10*6/MM3 (ref 3.89–5.14)
SODIUM BLD-SCNC: 142 MMOL/L (ref 132–146)
T4 FREE SERPL-MCNC: 1.26 NG/DL (ref 0.89–1.76)
TRIGL SERPL-MCNC: 293 MG/DL (ref 0–150)
TSH SERPL DL<=0.05 MIU/L-ACNC: 2.2 MIU/ML (ref 0.35–5.35)
VIT B12 BLD-MCNC: 609 PG/ML (ref 211–911)
WBC NRBC COR # BLD: 8.95 10*3/MM3 (ref 3.5–10.8)

## 2018-12-11 PROCEDURE — 84439 ASSAY OF FREE THYROXINE: CPT | Performed by: FAMILY MEDICINE

## 2018-12-11 PROCEDURE — 99396 PREV VISIT EST AGE 40-64: CPT | Performed by: FAMILY MEDICINE

## 2018-12-11 PROCEDURE — 82306 VITAMIN D 25 HYDROXY: CPT | Performed by: FAMILY MEDICINE

## 2018-12-11 PROCEDURE — 80050 GENERAL HEALTH PANEL: CPT | Performed by: FAMILY MEDICINE

## 2018-12-11 PROCEDURE — 82607 VITAMIN B-12: CPT | Performed by: FAMILY MEDICINE

## 2018-12-11 PROCEDURE — 99213 OFFICE O/P EST LOW 20 MIN: CPT | Performed by: FAMILY MEDICINE

## 2018-12-11 PROCEDURE — 80061 LIPID PANEL: CPT | Performed by: FAMILY MEDICINE

## 2018-12-11 RX ORDER — BUPROPION HYDROCHLORIDE 300 MG/1
300 TABLET ORAL DAILY
Qty: 30 TABLET | Refills: 2 | Status: SHIPPED | OUTPATIENT
Start: 2018-12-11 | End: 2019-04-22 | Stop reason: SDUPTHER

## 2018-12-11 RX ORDER — BUSPIRONE HYDROCHLORIDE 10 MG/1
TABLET ORAL
Qty: 90 TABLET | Refills: 2 | Status: SHIPPED | OUTPATIENT
Start: 2018-12-11 | End: 2019-07-03 | Stop reason: SDUPTHER

## 2018-12-11 NOTE — PATIENT INSTRUCTIONS
"1. CPE- pap due next year. Will schedule her screening mammo. Will order screening colonoscpy. Advised to get the Tdap at the pharmacy. Will order screening labs  2. LLQ PAIN- will check an US as she has had ovarian cyst rupture in past. No intervention if involuting. If US is normal, discussed treating the constipation.  3. PSYCH- depression with anxiety- will continue the wellbutrin 300 as it takes 3mo for this to reach its max potential for improvement. Will also address the thyroid.  3. ENDO- hypothyroid with goiter- will keep the cytomel at 5 and increase the synthroid from 50 to 75. If she starts to feel over replaced, she is to reduce dose again. List printed. Will check labs today and recheck labs in 3mo.   4. RECHECK- 3mo    Patient education regarding hypothyroidism provided today in layman's terms. Pt advised regarding the location and function of the thyroid (thyroid hormone as a regulator for other body systems). Discussed common signs or symptoms of low thyroid including fatigue, hair loss, weight gain, hoarseness, depression, slow speech, dry skin, brittle nails, feeling cold, constipation and joint/ muscle pain. Also discussed the signs/ symptoms of too much (high) thyroid including fatigue, fast heart rate, weight loss, increased appetite, anxiousness, sweating, muscle aches and loose stools.    Discussed that the goal is to be \"euthryoid\", meaning that the patient has the correct amount of thyroid hormone available. This is accomplished using synthetic hormone, levothyroxine (Synthroid). Discussed that decisions regarding the dose of levothyroxine are made using a lab called TSH (thyroid stimulating hormone) and patient symptoms. The patient is also advised of the importance of taking levothyroxine correctly (on an empty stomach and waiting 1 hour before eating, drinking or taking other medicines) to ensure adequate absorption of the medicine.   "

## 2018-12-11 NOTE — PROGRESS NOTES
Subjective   Vish Russo is a 53 y.o. female.     History of Present Illness   Her for CPE and 1mo recheck mood. Last seen 11/15/18 for recheck mood/ weight. Had to cancel CPE due to car trouble and family emergency. Lab 9/18/18 demo , tg 322, HDL 36, . Lab 6/7/18 demo TSH 0.748 and free T4 1.6 on synthroid 50. Lab 4/24/18 demo TSH 0.37 and free T4 1.46 on synthroid 25. Lab 2/13/18 demo TSH 0.253, free T4 1.35, neg thyroid abs. US demo multinodular goiter. Had labs 6/5/17 with normal CBC, CMP, TSH and B12. Vit D was 22.  Lab 8/16/16 demo , tg 127, HDL 47,  on Lipitor 10.     1. ENDO- goiter. Found on exam. Pt c/o hoarseness, fatigue, weight gain, dry skin, brittle nails, rash and constipation. Had slightly suppressed TSH, normal free T4 and US that demo multiple nodules, none predominant. Was seen with hyperpigmentation around her neck. Was tried on synthroid and TSH normalized. Was titreated up to synthroid 50 with eventual addition of cytomel 5 and pt reached goal clinically at visit 8/20/18 on this combo. Today pt reports she is having globus with dysphagia again. Also feeling tired with no energy and having neck pain again (stiffness).    2. CV- hyperlipid. Pt was on Lipitor. Stopped it but lipid improved with diet and weight loss with last lipid done 9/18/18 with .   3. ORTHO- right CTS. Pt using a brace and doing stretches but worsening and was referred to the hand specialist. Pt was then seen 9/8/16 with a fall with multiple injuries. Had been to ER and had xray of left wrist, hip, knee and ankle; neg other than moderate OA. Pt went to [facility] with MRI of her foot and knee. Did not respond to shot in right foot and left knee. Then had arthroscopy on her left knee and neuroma resection left foot. Did well but has continued to have left shoulder pain, likely related to working as a . Was restarted on Mobic. Still has baclofen for prn use as well.  4. RESP-  intermittent hoarseness, likely related to allergies. Treated with singulair, Claritin and Flonase. Quit smoking with Chantix but then restarted 2016 when got very stressed. Hoarseness was improving with thyroid treatment but pt was having PND with difficulty swallowing and pot was advised to restart singulair with the Claritin and flonase. Was also started on Chantix and stopped smoking 5/14/18.    5. PSYCH- depression with anxiety- at her initial visit pt reported a h/o inadequate care (was seen by someone impersonating a doctor) and had been afraid to go to a doctor since then. Was having sad, irritable and anxious mood with guilt, anhedonia, feeling overwhelmed, fatigue, withdrawing, decreased motivation, crying, memory/ concentration problems, palpitations and insomnia; had had manic episodes. Was started on Cymbalta with Seroquel but felt oversedated. Did not tolerate abilify, Zyprexa or vraylar. Pt then stopped all meds and mood improved with thyroid treatment. Was still having weight issues. Baseline weight was 197.5 lb. Was started on Topamax and lost 3 lb to 193.5 but no additional. Had increased stress with relapse in mood with pt feeling sad, having alt C/D, crying, not sleeping well and getting anxious when going to her mother's house. Was given Wellbutrin but still regained 1.5 lb. to 195 lb. Was taken off Topamax and Wellbutrin was increased from 150 to 300mg. Today she reports she is tolerating the wellbutrin well. She feels better and is no longer crying but mood is not at goal. Still feels anxious situationally. Is taking the buspar tid.     6.CPE- previous 7/11/16  Last pap done: 7/11/16. No h/o abnormal.  LMP: 1996. Pt had a partial hysterectomy (retained cervix) and unlit oophorectomy post partum. Has a h/o ovarian cysts. Had rupture that required surgery.   Last mammo: advised initial 2016; no results received. No fam hx breast cancer. Today pt reports she has not had a mammo yet.  No current  breast, vaginal or bladder c/o. She is having LLQ fullness and soreness again. No bladder symptoms. She does have constipation.  Had last US 7/11/16 that demo an involuting ovarian cyst  GI- Previous colonoscopy: none. Cologuard ordered 7/11/16; not received. Today pt reports she just found out that she does have a family hx.   RESP- see above  Immunizations: Last Tdap: over 10 yr ago. No previous pneumovax or zostavax.    The following portions of the patient's history were reviewed and updated as appropriate: current medications, past family history, past medical history, past social history, past surgical history and problem list.    Review of Systems   Cardiovascular: Negative for chest pain.   Gastrointestinal: Negative for abdominal distention and abdominal pain.   Skin: Negative for color change.   Neurological: Negative for tremors, speech difficulty and headaches.   Psychiatric/Behavioral: Negative for agitation and confusion.   All other systems reviewed and are negative.        Current Outpatient Medications:   •  baclofen (LIORESAL) 20 MG tablet, TAKE ONE TABLET BY MOUTH TWICE A DAY, Disp: 60 tablet, Rfl: 2  •  buPROPion XL (WELLBUTRIN XL) 300 MG 24 hr tablet, Take 1 tablet by mouth Daily., Disp: 30 tablet, Rfl: 2  •  busPIRone (BUSPAR) 10 MG tablet, 1/2- 1 tab po q8hr prn anxiety or irritable bowel. 1 tab hs prn sleep, Disp: 90 tablet, Rfl: 2  •  HYDROcodone-acetaminophen (NORCO)  MG per tablet, , Disp: , Rfl:   •  levothyroxine (SYNTHROID, LEVOTHROID) 50 MCG tablet, Take 1 tab po qam fasting, wait 1hr for food or other meds. BRAND ONLY, Disp: 30 tablet, Rfl: 5  •  liothyronine (CYTOMEL) 5 MCG tablet, Take 1 tablet by mouth Daily., Disp: 30 tablet, Rfl: 5  •  meloxicam (MOBIC) 15 MG tablet, Take 1 tablet by mouth Daily. Prn pain or inflammation, Disp: 30 tablet, Rfl: 1  •  montelukast (SINGULAIR) 10 MG tablet, TAKE ONE TABLET BY MOUTH DAILY AS NEEDED FOR ALLERGIES, Disp: 30 tablet, Rfl: 2  •   "varenicline (CHANTIX CONTINUING MONTH VANDANA) 1 MG tablet, Take 1 tablet by mouth 2 (Two) Times a Day., Disp: 60 tablet, Rfl: 0    Objective     /76   Temp 97.5 °F (36.4 °C)   Ht 163.8 cm (64.5\")   Wt 90.1 kg (198 lb 9.6 oz)   BMI 33.56 kg/m²     Physical Exam   Constitutional: She is oriented to person, place, and time. She appears well-developed and well-nourished.   HENT:   Head: Normocephalic.   Right Ear: Tympanic membrane and ear canal normal.   Left Ear: Tympanic membrane and ear canal normal.   Nose: Nose normal.   Mouth/Throat: Oropharynx is clear and moist.   Eyes: Conjunctivae and EOM are normal. Pupils are equal, round, and reactive to light.   Neck: Normal range of motion. Neck supple. No thyromegaly present.   Cardiovascular: Normal rate, regular rhythm and normal heart sounds.   Pulmonary/Chest: Effort normal and breath sounds normal. Right breast exhibits no mass, no nipple discharge and no skin change. Left breast exhibits no mass, no nipple discharge and no skin change.   Abdominal: Soft. Bowel sounds are normal. She exhibits no distension. There is no tenderness.   Genitourinary: Vagina normal.   Musculoskeletal: Normal range of motion.   Lymphadenopathy:     She has no cervical adenopathy.   Neurological: She is alert and oriented to person, place, and time.   Skin: Skin is warm and dry.   Psychiatric: She has a normal mood and affect. Her behavior is normal.   Nursing note and vitals reviewed.      Reviewed the following with the patient: Discussion today with patient regarding current guidelines for timing/ frequency of paps, mammograms, colonoscopy (or cologuard), bone density tests and lab tests.     Immunizations discussed today with current recommendations advised for DTaP, Zostavax, Pneumovax and Prevnar 13.    Appropriate diet and stretching discussed with handouts provided.      Assessment/Plan   Vish was seen today for annual exam.    Diagnoses and all orders for this " visit:    Annual physical exam  -     Mammo Screening Digital Tomosynthesis Bilateral With CAD  -     Ambulatory Referral For Screening Colonoscopy  -     CBC & Differential  -     Comprehensive Metabolic Panel  -     Lipid Panel  -     Vitamin B12  -     Vitamin D 25 Hydroxy    Depression with anxiety  -     buPROPion XL (WELLBUTRIN XL) 300 MG 24 hr tablet; Take 1 tablet by mouth Daily.  -     busPIRone (BUSPAR) 10 MG tablet; 1/2- 1 tab po q8hr prn anxiety or irritable bowel. 1 tab hs prn sleep    Goiter diffuse, nontoxic  -     TSH  -     T4, Free    Left lower quadrant pain  -     US Pelvis Limited        1. CPE- pap due next year. Will schedule her screening mammo. Will order screening colonoscpy. Advised to get the Tdap at the pharmacy. Will order screening labs  2. LLQ PAIN- will check an US as she has had ovarian cyst rupture in past. No intervention if involuting. If US is normal, discussed treating the constipation.  3. PSYCH- depression with anxiety- will continue the wellbutrin 300 as it takes 3mo for this to reach its max potential for improvement. Will also address the thyroid.  3. ENDO- hypothyroid with goiter- will keep the cytomel at 5 and increase the synthroid from 50 to 75. If she starts to feel over replaced, she is to reduce dose again. List printed. Will check labs today and recheck labs in 3mo.   4. RECHECK- 3mo

## 2018-12-20 ENCOUNTER — APPOINTMENT (OUTPATIENT)
Dept: ULTRASOUND IMAGING | Facility: HOSPITAL | Age: 53
End: 2018-12-20
Attending: FAMILY MEDICINE

## 2019-01-08 DIAGNOSIS — Z12.11 SCREENING FOR COLON CANCER: Primary | ICD-10-CM

## 2019-01-08 RX ORDER — BACLOFEN 20 MG/1
TABLET ORAL
Qty: 60 TABLET | Refills: 0 | Status: SHIPPED | OUTPATIENT
Start: 2019-01-08 | End: 2019-03-20 | Stop reason: SDUPTHER

## 2019-01-08 RX ORDER — SODIUM, POTASSIUM,MAG SULFATES 17.5-3.13G
SOLUTION, RECONSTITUTED, ORAL ORAL
Qty: 2 BOTTLE | Refills: 0 | Status: SHIPPED | OUTPATIENT
Start: 2019-01-08 | End: 2019-08-20

## 2019-02-04 DIAGNOSIS — Z72.0 SMOKING TRYING TO QUIT: ICD-10-CM

## 2019-02-04 RX ORDER — VARENICLINE TARTRATE 1 MG/1
TABLET, FILM COATED ORAL
Qty: 56 TABLET | Refills: 0 | Status: SHIPPED | OUTPATIENT
Start: 2019-02-04 | End: 2019-03-20 | Stop reason: SDUPTHER

## 2019-02-05 ENCOUNTER — HOSPITAL ENCOUNTER (OUTPATIENT)
Dept: MAMMOGRAPHY | Facility: HOSPITAL | Age: 54
Discharge: HOME | End: 2019-02-05
Attending: FAMILY MEDICINE | Admitting: FAMILY MEDICINE

## 2019-02-05 PROCEDURE — 77063 BREAST TOMOSYNTHESIS BI: CPT

## 2019-02-05 PROCEDURE — 77067 SCR MAMMO BI INCL CAD: CPT | Performed by: RADIOLOGY

## 2019-02-05 PROCEDURE — 77067 SCR MAMMO BI INCL CAD: CPT

## 2019-02-05 PROCEDURE — 77063 BREAST TOMOSYNTHESIS BI: CPT | Performed by: RADIOLOGY

## 2019-02-15 ENCOUNTER — HOSPITAL ENCOUNTER (OUTPATIENT)
Dept: MAMMOGRAPHY | Facility: HOSPITAL | Age: 54
Discharge: HOME | End: 2019-02-15
Admitting: RADIOLOGY

## 2019-02-15 DIAGNOSIS — R92.8 ABNORMAL MAMMOGRAM: ICD-10-CM

## 2019-02-15 PROCEDURE — 77065 DX MAMMO INCL CAD UNI: CPT | Performed by: RADIOLOGY

## 2019-02-15 PROCEDURE — 77065 DX MAMMO INCL CAD UNI: CPT

## 2019-02-15 PROCEDURE — 77061 BREAST TOMOSYNTHESIS UNI: CPT | Performed by: RADIOLOGY

## 2019-02-15 PROCEDURE — G0279 TOMOSYNTHESIS, MAMMO: HCPCS

## 2019-03-20 ENCOUNTER — TELEPHONE (OUTPATIENT)
Dept: INTERNAL MEDICINE | Facility: CLINIC | Age: 54
End: 2019-03-20

## 2019-03-20 DIAGNOSIS — Z72.0 SMOKING TRYING TO QUIT: ICD-10-CM

## 2019-03-20 RX ORDER — VARENICLINE TARTRATE 1 MG/1
1 TABLET, FILM COATED ORAL 2 TIMES DAILY
Qty: 56 TABLET | Refills: 0 | Status: SHIPPED | OUTPATIENT
Start: 2019-03-20 | End: 2019-04-26 | Stop reason: SDUPTHER

## 2019-03-20 RX ORDER — BACLOFEN 20 MG/1
20 TABLET ORAL 2 TIMES DAILY
Qty: 60 TABLET | Refills: 0 | Status: SHIPPED | OUTPATIENT
Start: 2019-03-20 | End: 2019-05-29 | Stop reason: SDUPTHER

## 2019-04-22 DIAGNOSIS — E04.0 GOITER DIFFUSE, NONTOXIC: ICD-10-CM

## 2019-04-22 DIAGNOSIS — F41.8 DEPRESSION WITH ANXIETY: ICD-10-CM

## 2019-04-23 RX ORDER — BUPROPION HYDROCHLORIDE 300 MG/1
TABLET ORAL
Qty: 30 TABLET | Refills: 1 | Status: SHIPPED | OUTPATIENT
Start: 2019-04-23 | End: 2019-04-26 | Stop reason: SDUPTHER

## 2019-04-23 RX ORDER — LEVOTHYROXINE SODIUM 50 MCG
TABLET ORAL
Qty: 30 TABLET | Refills: 1 | Status: SHIPPED | OUTPATIENT
Start: 2019-04-23 | End: 2019-04-26

## 2019-04-26 ENCOUNTER — OFFICE VISIT (OUTPATIENT)
Dept: INTERNAL MEDICINE | Facility: CLINIC | Age: 54
End: 2019-04-26

## 2019-04-26 VITALS
WEIGHT: 202 LBS | DIASTOLIC BLOOD PRESSURE: 74 MMHG | HEIGHT: 65 IN | BODY MASS INDEX: 33.66 KG/M2 | SYSTOLIC BLOOD PRESSURE: 124 MMHG

## 2019-04-26 DIAGNOSIS — E78.00 PURE HYPERCHOLESTEROLEMIA: ICD-10-CM

## 2019-04-26 DIAGNOSIS — S99.911A ANKLE INJURY, RIGHT, INITIAL ENCOUNTER: ICD-10-CM

## 2019-04-26 DIAGNOSIS — M19.90 ARTHRITIS: ICD-10-CM

## 2019-04-26 DIAGNOSIS — E04.0 GOITER DIFFUSE, NONTOXIC: Primary | ICD-10-CM

## 2019-04-26 DIAGNOSIS — Z72.0 SMOKING TRYING TO QUIT: ICD-10-CM

## 2019-04-26 DIAGNOSIS — F41.8 DEPRESSION WITH ANXIETY: ICD-10-CM

## 2019-04-26 PROCEDURE — 99214 OFFICE O/P EST MOD 30 MIN: CPT | Performed by: FAMILY MEDICINE

## 2019-04-26 RX ORDER — VILAZODONE HYDROCHLORIDE 20 MG/1
20 TABLET ORAL DAILY
Qty: 30 TABLET | Refills: 0 | Status: SHIPPED | OUTPATIENT
Start: 2019-04-26 | End: 2019-05-13 | Stop reason: SDUPTHER

## 2019-04-26 RX ORDER — BUPROPION HYDROCHLORIDE 300 MG/1
300 TABLET ORAL DAILY
Qty: 30 TABLET | Refills: 5 | Status: SHIPPED | OUTPATIENT
Start: 2019-04-26 | End: 2020-03-30 | Stop reason: SDUPTHER

## 2019-04-26 RX ORDER — VARENICLINE TARTRATE 1 MG/1
1 TABLET, FILM COATED ORAL 2 TIMES DAILY
Qty: 56 TABLET | Refills: 2 | Status: SHIPPED | OUTPATIENT
Start: 2019-04-26 | End: 2019-08-06 | Stop reason: SDUPTHER

## 2019-04-26 RX ORDER — LEVOTHYROXINE SODIUM 0.07 MG/1
TABLET ORAL
Qty: 30 TABLET | Refills: 1 | Status: SHIPPED | OUTPATIENT
Start: 2019-04-26 | End: 2019-05-29 | Stop reason: SDUPTHER

## 2019-04-26 RX ORDER — ATORVASTATIN CALCIUM 10 MG/1
10 TABLET, FILM COATED ORAL DAILY
Qty: 30 TABLET | Refills: 2 | Status: SHIPPED | OUTPATIENT
Start: 2019-04-26 | End: 2019-05-29

## 2019-04-26 RX ORDER — MELOXICAM 15 MG/1
15 TABLET ORAL DAILY
Qty: 30 TABLET | Refills: 1 | Status: SHIPPED | OUTPATIENT
Start: 2019-04-26 | End: 2019-07-03 | Stop reason: SDUPTHER

## 2019-04-26 NOTE — PATIENT INSTRUCTIONS
1. ENDO- hypothyroid- will change the synthroid to 75 now.  2. PSYCH- mood- still very anxious. Will start viibryd and titrate up to 20mg using samples and coupon. Ok to add buspar but eventually should not need it.   3. RESP- restart chantix. Discussed that addressing her mood is vital.  4. CV- hyperlipid- restart lipitor now. May be able to stop it again once thyroid at goal and pt able to lose weight.  5. RECHECK- 1mo
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
## 2019-05-13 ENCOUNTER — TELEPHONE (OUTPATIENT)
Dept: INTERNAL MEDICINE | Facility: CLINIC | Age: 54
End: 2019-05-13

## 2019-05-13 DIAGNOSIS — F41.8 DEPRESSION WITH ANXIETY: ICD-10-CM

## 2019-05-13 RX ORDER — VILAZODONE HYDROCHLORIDE 20 MG/1
20 TABLET ORAL DAILY
Qty: 30 TABLET | Refills: 0 | Status: SHIPPED | OUTPATIENT
Start: 2019-05-13 | End: 2019-05-29 | Stop reason: SDUPTHER

## 2019-05-29 ENCOUNTER — OFFICE VISIT (OUTPATIENT)
Dept: INTERNAL MEDICINE | Facility: CLINIC | Age: 54
End: 2019-05-29

## 2019-05-29 VITALS
HEIGHT: 65 IN | SYSTOLIC BLOOD PRESSURE: 116 MMHG | DIASTOLIC BLOOD PRESSURE: 74 MMHG | WEIGHT: 194.4 LBS | BODY MASS INDEX: 32.39 KG/M2

## 2019-05-29 DIAGNOSIS — F32.1 CURRENT MODERATE EPISODE OF MAJOR DEPRESSIVE DISORDER, UNSPECIFIED WHETHER RECURRENT (HCC): ICD-10-CM

## 2019-05-29 DIAGNOSIS — J30.89 PERENNIAL ALLERGIC RHINITIS: ICD-10-CM

## 2019-05-29 DIAGNOSIS — F41.8 DEPRESSION WITH ANXIETY: ICD-10-CM

## 2019-05-29 DIAGNOSIS — E04.0 GOITER DIFFUSE, NONTOXIC: Primary | ICD-10-CM

## 2019-05-29 PROCEDURE — 99214 OFFICE O/P EST MOD 30 MIN: CPT | Performed by: FAMILY MEDICINE

## 2019-05-29 RX ORDER — BACLOFEN 20 MG/1
20 TABLET ORAL 2 TIMES DAILY
Qty: 60 TABLET | Refills: 0 | Status: SHIPPED | OUTPATIENT
Start: 2019-05-29 | End: 2019-05-30 | Stop reason: SDUPTHER

## 2019-05-29 RX ORDER — FLUTICASONE PROPIONATE 50 MCG
2 SPRAY, SUSPENSION (ML) NASAL DAILY
Qty: 16 G | Refills: 2 | Status: SHIPPED | OUTPATIENT
Start: 2019-05-29 | End: 2020-01-22 | Stop reason: SDUPTHER

## 2019-05-29 RX ORDER — LEVOTHYROXINE SODIUM 0.07 MG/1
TABLET ORAL
Qty: 30 TABLET | Refills: 1 | Status: SHIPPED | OUTPATIENT
Start: 2019-05-29 | End: 2019-09-13 | Stop reason: SDUPTHER

## 2019-05-29 RX ORDER — VILAZODONE HYDROCHLORIDE 20 MG/1
20 TABLET ORAL DAILY
Qty: 30 TABLET | Refills: 1 | Status: SHIPPED | OUTPATIENT
Start: 2019-05-29 | End: 2019-08-20
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
## 2019-05-29 NOTE — PATIENT INSTRUCTIONS
1. ENDO- goiter with hoarseness- improving with cytomel 5 and synthroid 75. Continue this combo. Can do labs at next visit as she is clinically at goal.   2. PSYCH- major depression- will ue more specific diagnosis to get PA on viibryd 20. RF today x2mo and then recheck to ensure she reaches goal  3. CV- hyperlipid- statin S/E. Will stop lipitor and recheck her lipid in 2mo with no statin with pt losing weight and treating her thyroid to goal, especially as she was able to control her lipid with weight loss previously.    4. RESP- allergies- RF flonase. Reminded to finish the chantix 6mo course.   5. RECHECK- 2mo fasting with Dr Fournier (TSH, free T4 and lipid)

## 2019-05-30 RX ORDER — BACLOFEN 20 MG/1
TABLET ORAL
Qty: 60 TABLET | Refills: 0 | Status: SHIPPED | OUTPATIENT
Start: 2019-05-30 | End: 2019-08-06 | Stop reason: SDUPTHER

## 2019-06-17 ENCOUNTER — PRIOR AUTHORIZATION (OUTPATIENT)
Dept: INTERNAL MEDICINE | Facility: CLINIC | Age: 54
End: 2019-06-17

## 2019-07-03 DIAGNOSIS — F41.8 DEPRESSION WITH ANXIETY: ICD-10-CM

## 2019-07-03 DIAGNOSIS — M19.90 ARTHRITIS: ICD-10-CM

## 2019-07-03 RX ORDER — BUSPIRONE HYDROCHLORIDE 10 MG/1
TABLET ORAL
Qty: 90 TABLET | Refills: 2 | Status: SHIPPED | OUTPATIENT
Start: 2019-07-03 | End: 2020-01-22

## 2019-07-03 RX ORDER — MELOXICAM 15 MG/1
15 TABLET ORAL DAILY
Qty: 30 TABLET | Refills: 1 | Status: SHIPPED | OUTPATIENT
Start: 2019-07-03 | End: 2020-01-22 | Stop reason: SDUPTHER

## 2019-07-31 ENCOUNTER — TELEPHONE (OUTPATIENT)
Dept: INTERNAL MEDICINE | Facility: CLINIC | Age: 54
End: 2019-07-31

## 2019-08-06 DIAGNOSIS — Z72.0 SMOKING TRYING TO QUIT: ICD-10-CM

## 2019-08-06 RX ORDER — BACLOFEN 20 MG/1
20 TABLET ORAL 2 TIMES DAILY
Qty: 60 TABLET | Refills: 1 | Status: SHIPPED | OUTPATIENT
Start: 2019-08-06 | End: 2020-10-01 | Stop reason: SDUPTHER

## 2019-08-06 RX ORDER — VARENICLINE TARTRATE 1 MG/1
1 TABLET, FILM COATED ORAL 2 TIMES DAILY
Qty: 56 TABLET | Refills: 2 | Status: SHIPPED | OUTPATIENT
Start: 2019-08-06 | End: 2019-09-13 | Stop reason: SDUPTHER

## 2019-08-20 ENCOUNTER — OFFICE VISIT (OUTPATIENT)
Dept: INTERNAL MEDICINE | Facility: CLINIC | Age: 54
End: 2019-08-20

## 2019-08-20 VITALS
WEIGHT: 192 LBS | BODY MASS INDEX: 31.99 KG/M2 | HEIGHT: 65 IN | DIASTOLIC BLOOD PRESSURE: 78 MMHG | TEMPERATURE: 97.7 F | SYSTOLIC BLOOD PRESSURE: 124 MMHG

## 2019-08-20 DIAGNOSIS — E78.00 PURE HYPERCHOLESTEROLEMIA: Primary | ICD-10-CM

## 2019-08-20 DIAGNOSIS — E04.2 MULTINODULAR GOITER: ICD-10-CM

## 2019-08-20 DIAGNOSIS — F41.8 DEPRESSION WITH ANXIETY: ICD-10-CM

## 2019-08-20 DIAGNOSIS — E04.0 GOITER DIFFUSE, NONTOXIC: ICD-10-CM

## 2019-08-20 LAB
CHOLEST SERPL-MCNC: 281 MG/DL (ref 0–200)
HDLC SERPL-MCNC: 44 MG/DL (ref 40–60)
LDLC SERPL CALC-MCNC: 197 MG/DL (ref 0–100)
LDLC/HDLC SERPL: 4.47 {RATIO}
T4 FREE SERPL-MCNC: 1.6 NG/DL (ref 0.93–1.7)
TRIGL SERPL-MCNC: 202 MG/DL (ref 0–150)
TSH SERPL DL<=0.05 MIU/L-ACNC: 0.31 MIU/ML (ref 0.27–4.2)
VLDLC SERPL-MCNC: 40.4 MG/DL (ref 5–40)

## 2019-08-20 PROCEDURE — 84443 ASSAY THYROID STIM HORMONE: CPT | Performed by: INTERNAL MEDICINE

## 2019-08-20 PROCEDURE — 80061 LIPID PANEL: CPT | Performed by: INTERNAL MEDICINE

## 2019-08-20 PROCEDURE — 84439 ASSAY OF FREE THYROXINE: CPT | Performed by: INTERNAL MEDICINE

## 2019-08-20 PROCEDURE — 99214 OFFICE O/P EST MOD 30 MIN: CPT | Performed by: INTERNAL MEDICINE

## 2019-08-20 RX ORDER — VILAZODONE HYDROCHLORIDE 40 MG/1
40 TABLET ORAL DAILY
Qty: 30 TABLET | Refills: 2 | Status: SHIPPED | OUTPATIENT
Start: 2019-08-20 | End: 2020-01-06

## 2019-08-20 NOTE — PROGRESS NOTES
"Subjective   Vish Russo is a 54 y.o. female here for follow-up HLD, A&D, goiter. HLD: was on lipitor but had myalgias and arthralgias, particularly in the legs. She stopped the med and those sx resolved. She has HLD in her family. A&D: on viibryd now and has improved sx but feels the viibryd could be increased as she still has sx of anxiety and depression. She is seeing a counselor now and she is hopeful that will help. Her PCP prior to Dr. Alaniz faked his medical license so she says she has some trust issues with doctors. Goiter: had US thyroid which showed multinodular thyroid with no masses or nodules. She is on levothyroxine (stopped cytomel) and has been able to lose about 10 lbs and energy is good. Due for recheck. She is fasting.  She does have intermittent pain with swallowing. Doesn't feel her goiter has increased in size.    The following portions of the patient's history were reviewed and updated as appropriate: allergies, current medications, past medical history, past social history and problem list.    Review of Systems:  General: negative  HEENT: trouble swallowing  CV: negative  Respiratory: negative  Neuro: negative  Psych: see hpi  MSK: negative    Objective   /78 (BP Location: Left arm, Patient Position: Sitting, Cuff Size: Large Adult)   Temp 97.7 °F (36.5 °C) (Temporal)   Ht 163.8 cm (64.5\")   Wt 87.1 kg (192 lb)   BMI 32.45 kg/m²     Physical Exam   Constitutional: She is oriented to person, place, and time. She appears well-developed and well-nourished.   Cardiovascular: Normal rate, regular rhythm and normal heart sounds.   Pulmonary/Chest: Effort normal and breath sounds normal. She has no wheezes. She has no rales.   Neurological: She is alert and oriented to person, place, and time.   Skin: Skin is warm and dry.   Psychiatric: She has a normal mood and affect. Her behavior is normal. Thought content normal.   Vitals reviewed.      Assessment/Plan   Vish was seen today " for depression, anxiety and hyperlipidemia.    Diagnoses and all orders for this visit:    Pure hypercholesterolemia  -     Lipid Panel  -     Last . Will consider another statin, may try for livalo as it has less muscle pains  -     Chronic uncontrolled    Goiter diffuse, nontoxic  -     TSH  -     T4, Free  -      Chronic, stable. Get US for stability    Depression with anxiety  -     vilazodone (VIIBRYD) 40 MG tablet tablet; Take 1 tablet by mouth Daily. Increased dose for uncontrolled A&D    Multinodular goiter  -     US Thyroid

## 2019-08-28 ENCOUNTER — APPOINTMENT (OUTPATIENT)
Dept: ULTRASOUND IMAGING | Facility: HOSPITAL | Age: 54
End: 2019-08-28

## 2019-09-13 DIAGNOSIS — E04.0 GOITER DIFFUSE, NONTOXIC: ICD-10-CM

## 2019-09-13 DIAGNOSIS — Z72.0 SMOKING TRYING TO QUIT: ICD-10-CM

## 2019-09-13 RX ORDER — LEVOTHYROXINE SODIUM 0.07 MG/1
TABLET ORAL
Qty: 30 TABLET | Refills: 1 | Status: SHIPPED | OUTPATIENT
Start: 2019-09-13 | End: 2019-11-23 | Stop reason: SDUPTHER

## 2019-09-13 RX ORDER — VARENICLINE TARTRATE 1 MG/1
1 TABLET, FILM COATED ORAL 2 TIMES DAILY
Qty: 56 TABLET | Refills: 2 | Status: SHIPPED | OUTPATIENT
Start: 2019-09-13 | End: 2020-05-14

## 2019-11-23 DIAGNOSIS — E04.0 GOITER DIFFUSE, NONTOXIC: ICD-10-CM

## 2019-11-25 RX ORDER — LEVOTHYROXINE SODIUM 75 MCG
TABLET ORAL
Qty: 30 TABLET | Refills: 0 | Status: SHIPPED | OUTPATIENT
Start: 2019-11-25 | End: 2019-12-30

## 2019-12-30 DIAGNOSIS — E04.0 GOITER DIFFUSE, NONTOXIC: ICD-10-CM

## 2019-12-30 RX ORDER — LEVOTHYROXINE SODIUM 75 MCG
TABLET ORAL
Qty: 30 TABLET | Refills: 0 | Status: SHIPPED | OUTPATIENT
Start: 2019-12-30 | End: 2020-02-05

## 2020-01-05 DIAGNOSIS — F41.8 DEPRESSION WITH ANXIETY: ICD-10-CM

## 2020-01-06 RX ORDER — VILAZODONE HYDROCHLORIDE 40 MG/1
TABLET ORAL
Qty: 30 TABLET | Refills: 1 | Status: SHIPPED | OUTPATIENT
Start: 2020-01-06 | End: 2020-05-18

## 2020-01-22 ENCOUNTER — OFFICE VISIT (OUTPATIENT)
Dept: INTERNAL MEDICINE | Facility: CLINIC | Age: 55
End: 2020-01-22

## 2020-01-22 VITALS
BODY MASS INDEX: 31.36 KG/M2 | DIASTOLIC BLOOD PRESSURE: 80 MMHG | TEMPERATURE: 97.8 F | SYSTOLIC BLOOD PRESSURE: 126 MMHG | WEIGHT: 188.2 LBS | HEIGHT: 65 IN

## 2020-01-22 DIAGNOSIS — F51.04 CHRONIC INSOMNIA: ICD-10-CM

## 2020-01-22 DIAGNOSIS — M19.90 ARTHRITIS: ICD-10-CM

## 2020-01-22 DIAGNOSIS — J30.89 PERENNIAL ALLERGIC RHINITIS: ICD-10-CM

## 2020-01-22 DIAGNOSIS — F41.9 ANXIETY: ICD-10-CM

## 2020-01-22 DIAGNOSIS — E04.0 GOITER DIFFUSE, NONTOXIC: Primary | ICD-10-CM

## 2020-01-22 LAB
T3FREE SERPL-MCNC: 3.14 PG/ML (ref 2–4.4)
T4 FREE SERPL-MCNC: 1.91 NG/DL (ref 0.93–1.7)
TSH SERPL DL<=0.05 MIU/L-ACNC: 0.45 UIU/ML (ref 0.27–4.2)

## 2020-01-22 PROCEDURE — 84481 FREE ASSAY (FT-3): CPT | Performed by: INTERNAL MEDICINE

## 2020-01-22 PROCEDURE — 99214 OFFICE O/P EST MOD 30 MIN: CPT | Performed by: INTERNAL MEDICINE

## 2020-01-22 PROCEDURE — 84439 ASSAY OF FREE THYROXINE: CPT | Performed by: INTERNAL MEDICINE

## 2020-01-22 PROCEDURE — 84443 ASSAY THYROID STIM HORMONE: CPT | Performed by: INTERNAL MEDICINE

## 2020-01-22 RX ORDER — MELOXICAM 15 MG/1
15 TABLET ORAL DAILY
Qty: 90 TABLET | Refills: 2 | Status: SHIPPED | OUTPATIENT
Start: 2020-01-22 | End: 2021-04-21

## 2020-01-22 RX ORDER — FLUTICASONE PROPIONATE 50 MCG
2 SPRAY, SUSPENSION (ML) NASAL DAILY
Qty: 3 BOTTLE | Refills: 3 | Status: SHIPPED | OUTPATIENT
Start: 2020-01-22 | End: 2021-03-08

## 2020-01-22 RX ORDER — HYDROXYZINE PAMOATE 25 MG/1
25 CAPSULE ORAL 3 TIMES DAILY PRN
Qty: 45 CAPSULE | Refills: 2 | Status: SHIPPED | OUTPATIENT
Start: 2020-01-22 | End: 2020-05-14

## 2020-01-22 NOTE — PROGRESS NOTES
"Subjective   Vish Russo is a 54 y.o. female here for follow-up goiter, hypothyroidism (?),  Anxiety, insomnia, arthritis.  Patient does not feel like herself lately.  She has had high anxiety though she does admit that there is a lot going on her life.  She says that these things are not new though they have been going on a while so she does not really know why her anxiety has spiked recently.  She is interested in potentially starting medication but she wonders if it could be her thyroid as well.  She has history of goiter when she is on levothyroxine and recently stopped Cytomel, done by previous PCP.  She does not feel that stopping the Cytomel caused her issues.  She feels that the pressure in her throat and she has had some hoarseness.  She denies any trouble swallowing.  She is also having trouble sleeping she thinks due to anxiety.  She has developed a rash around her neck which is itchy, red, raised.  It has been there for a long time.  Lastly, she has arthritis of multiple joints and takes Mobic for that.  She feels that it helps to control her symptoms.    I have reviewed the following portions of the patient's history and confirmed they are accurate: current medications, past medical history, past social history and problem list     I have personally completed the patient's review of systems.    Review of Systems:  General: negative  HEENT: Hoarseness, neck swelling  CV: negative  Respiratory: negative  Neuro: negative  Psych: Anxiety  Skin: Rash    Objective   /80 (BP Location: Left arm, Patient Position: Sitting, Cuff Size: Large Adult)   Temp 97.8 °F (36.6 °C) (Temporal)   Ht 163.8 cm (64.5\")   Wt 85.4 kg (188 lb 3.2 oz)   BMI 31.81 kg/m²     Physical Exam   Constitutional: She is oriented to person, place, and time. She appears well-developed and well-nourished.   Neck: Thyromegaly present.   Pulmonary/Chest: Effort normal.   Neurological: She is alert and oriented to person, place, and " time.   Skin: Skin is warm and dry.   Psychiatric: She has a normal mood and affect. Her behavior is normal. Judgment and thought content normal.   Vitals reviewed.      Assessment/Plan   Vish was seen today for follow-up.    Diagnoses and all orders for this visit:    Goiter diffuse, nontoxic  -     TSH  -     T4, Free  -     T3, Free  -     US Thyroid  -Swelling on exam and possibly symptomatic.  Unclear if patient ever had hypothyroidism or was treated for goiter with  her medications.  Treatment of a goiter with levothyroxine and Cytomel would obviously be inappropriate.  Unclear with review of the charts if she has hypothyroidism or not.  We will get blood work today and ultrasound of the thyroid.  May require referral to endocrinology.    Anxiety  We will get blood work to evaluate first, and then may consider medication for anxiety though can give hydroxyzine in the meantime    Chronic insomnia  -     hydrOXYzine pamoate (VISTARIL) 25 MG capsule; Take 1 capsule by mouth 3 (Three) Times a Day As Needed for Itching or Anxiety  -Chronic worsening    Arthritis  -     meloxicam (MOBIC) 15 MG tablet; Take 1 tablet by mouth Daily. Prn pain or inflammation  -Chronic controlled                 Ellie Campbell MA    Please note that portions of this note were completed with a voice recognition program. Efforts were made to edit the dictations, but occasionally words are mistranscribed.

## 2020-01-23 ENCOUNTER — TELEPHONE (OUTPATIENT)
Dept: INTERNAL MEDICINE | Facility: CLINIC | Age: 55
End: 2020-01-23

## 2020-01-23 NOTE — TELEPHONE ENCOUNTER
----- Message from Argelia Mckeon MD sent at 1/23/2020  7:59 AM EST -----  Please call the patient regarding her abnormal result.  Tell her TSH is okay but the thyroid hormone itself is elevated.  Ask her if Dr. Alaniz ever told her that she had hypothyroidism or was she just treating her goiter with levothyroxine?  If she has been treating her goiter with levothyroxine then she may actually be making the problem worse.

## 2020-01-29 ENCOUNTER — HOSPITAL ENCOUNTER (OUTPATIENT)
Dept: ULTRASOUND IMAGING | Facility: HOSPITAL | Age: 55
Discharge: HOME | End: 2020-01-29
Admitting: INTERNAL MEDICINE

## 2020-01-29 PROCEDURE — 76536 US EXAM OF HEAD AND NECK: CPT

## 2020-02-03 DIAGNOSIS — E04.0 GOITER DIFFUSE, NONTOXIC: Primary | ICD-10-CM

## 2020-02-05 DIAGNOSIS — E04.0 GOITER DIFFUSE, NONTOXIC: ICD-10-CM

## 2020-02-05 RX ORDER — LEVOTHYROXINE SODIUM 75 MCG
TABLET ORAL
Qty: 30 TABLET | Refills: 0 | Status: SHIPPED | OUTPATIENT
Start: 2020-02-05 | End: 2020-03-30

## 2020-02-10 ENCOUNTER — TELEPHONE (OUTPATIENT)
Dept: ENDOCRINOLOGY | Facility: CLINIC | Age: 55
End: 2020-02-10

## 2020-03-28 DIAGNOSIS — E04.0 GOITER DIFFUSE, NONTOXIC: ICD-10-CM

## 2020-03-30 DIAGNOSIS — F41.8 DEPRESSION WITH ANXIETY: ICD-10-CM

## 2020-03-30 RX ORDER — LEVOTHYROXINE SODIUM 75 MCG
TABLET ORAL
Qty: 30 TABLET | Refills: 2 | Status: SHIPPED | OUTPATIENT
Start: 2020-03-30 | End: 2020-07-24

## 2020-03-30 RX ORDER — BUPROPION HYDROCHLORIDE 300 MG/1
300 TABLET ORAL DAILY
Qty: 30 TABLET | Refills: 5 | Status: SHIPPED | OUTPATIENT
Start: 2020-03-30 | End: 2020-12-31

## 2020-03-31 ENCOUNTER — TELEPHONE (OUTPATIENT)
Dept: INTERNAL MEDICINE | Facility: CLINIC | Age: 55
End: 2020-03-31

## 2020-04-01 RX ORDER — BUSPIRONE HYDROCHLORIDE 5 MG/1
TABLET ORAL
Qty: 42 TABLET | Refills: 0 | Status: SHIPPED | OUTPATIENT
Start: 2020-04-01 | End: 2020-05-14

## 2020-04-01 RX ORDER — PROPRANOLOL HYDROCHLORIDE 10 MG/1
10 TABLET ORAL 3 TIMES DAILY PRN
Qty: 21 TABLET | Refills: 1 | Status: SHIPPED | OUTPATIENT
Start: 2020-04-01 | End: 2020-05-14

## 2020-04-01 NOTE — TELEPHONE ENCOUNTER
Before I go ahead and put something in, has she ever tried propranolol? And honestly I'm running out of meds that will work quickly. I can't find where buspar wasn't good for her, raghav and I said it was working well though I didn't read all of my notes. Was there anything that worked well for her in the past? Non benzo.

## 2020-04-01 NOTE — TELEPHONE ENCOUNTER
I will leave the levo to Dr. Hernandes then. Though I think she will be taken off of it. I reviewed Dr. Alaniz's notes and it was originally for goiter I think but then there was an elevated TSH and she was put on it (?). Anyway, I will put in for buspar for the anxiety. It works relatively quickly.

## 2020-04-01 NOTE — TELEPHONE ENCOUNTER
I put in for a trial of propranolol.  It does work relatively quickly so hopefully this will help.  Warned her of possible side effects of dizziness or feeling faint particularly when going from sitting to standing.  At this low dose I do not often see that though (if ever)

## 2020-04-01 NOTE — TELEPHONE ENCOUNTER
PATIENT HAS CALLED TO INFORM THAT SHE FORGOT TO MENTION THAT SHE DOESN'T WANT TO TAKE THE BUSPAR AS IT DOESN'T SEEM TO BE EFFECTIVE FOR HER. SHE STATES THIS INFORMATION IS LISTED IN HER CHART. SHE WOULD LIKE TO KNOW IF SOMETHING ELSE CAN BE CALLED INTO [facility] [address]. PLEASE CALL PATIENT FOR ANY QUESTIONS OR CONCERNS   [phone]

## 2020-04-01 NOTE — TELEPHONE ENCOUNTER
She says Dr. Alaniz was treating her goiter with levothyroxine then determined it wasn't helping. A few months later her T4 was abnormal and started back on levothyroxine.     She says the hydroxyzine helped with the rash on her neck and stopped the itching but done nothing to help her with her anxiety. She has also tried seroquel and that didn't help.  Advised her you don't like to give benzos due the risk of addiction. She was ok with that. Would just like to try something to help her.

## 2020-05-14 ENCOUNTER — TELEMEDICINE (OUTPATIENT)
Dept: INTERNAL MEDICINE | Facility: CLINIC | Age: 55
End: 2020-05-14

## 2020-05-14 DIAGNOSIS — F41.9 SEVERE ANXIETY: ICD-10-CM

## 2020-05-14 DIAGNOSIS — J30.1 ALLERGIC RHINITIS DUE TO POLLEN, UNSPECIFIED SEASONALITY: ICD-10-CM

## 2020-05-14 DIAGNOSIS — Z72.0 NICOTINE ABUSE: Primary | ICD-10-CM

## 2020-05-14 PROCEDURE — 99214 OFFICE O/P EST MOD 30 MIN: CPT | Performed by: INTERNAL MEDICINE

## 2020-05-14 RX ORDER — LEVOCETIRIZINE DIHYDROCHLORIDE 5 MG/1
5 TABLET, FILM COATED ORAL EVERY EVENING
Qty: 30 TABLET | Refills: 2 | Status: SHIPPED | OUTPATIENT
Start: 2020-05-14 | End: 2020-10-28

## 2020-05-14 RX ORDER — DIAZEPAM 5 MG/1
5 TABLET ORAL EVERY 8 HOURS PRN
Qty: 15 TABLET | Refills: 0 | Status: SHIPPED | OUTPATIENT
Start: 2020-05-14 | End: 2020-05-18 | Stop reason: SDUPTHER

## 2020-05-14 RX ORDER — VARENICLINE TARTRATE 1 MG/1
1 TABLET, FILM COATED ORAL 2 TIMES DAILY
Qty: 60 TABLET | Refills: 2 | Status: SHIPPED | OUTPATIENT
Start: 2020-05-14 | End: 2021-01-06

## 2020-05-14 NOTE — PROGRESS NOTES
Subjective   Vish Russo is a 55 y.o. female seen via telehealth with interactive audio and video for anxiety, allergic rhinitis, nicotine abuse.  Her anxiety is at an all-time high.  Patient has been on Cymbalta, Seroquel, Abilify, Zyprexa, Vraylar, Wellbutrin, BuSpar, Viibryd.  She is currently onViibryd 40 mg, Wellbutrin 300 mg.  She stopped the BuSpar as it was ineffective.  She feels very anxious all the time, cannot sleep, feels jittery and panicky.  She feels this way most of the day.  She is trying to use breathing techniques and step away from things whenever she feels extremely anxious, but she is having a difficult time functioning in her day-to-day life.  She has never been on a benzodiazepine before.  She has no history of any addictions.  She also would like to start back on Chantix.  She had done really well and had cut back on cigarettes a bunch, but now she is smoking a pack and a half every other day.  She did well on Chantix in the past.  She also has allergic rhinitis, would like a prescription for new medication for this.  She took Singulair in the past but had a negative reaction to it.    I have reviewed the following portions of the patient's history and confirmed they are accurate: current medications, past medical history, past social history and problem list     I have personally completed the patient's review of systems.    Review of Systems:  General: negative   HEENT: Sinus congestion  CV: negative  Respiratory: negative  Neuro: negative  Psych: Anxiety  Allergies: Environmental    Objective   There were no vitals taken for this visit.    Physical Exam   Constitutional: She is oriented to person, place, and time. She appears well-developed and well-nourished.   Pulmonary/Chest: Effort normal.   Neurological: She is alert and oriented to person, place, and time.   Skin: Skin is warm and dry.   Psychiatric: She has a normal mood and affect. Her behavior is normal. Judgment and thought  content normal.   Vitals reviewed.      Assessment/Plan   Diagnoses and all orders for this visit:    Nicotine abuse  -     varenicline (Chantix Starting Month Oleg) 0.5 MG X 11 & 1 MG X 42 tablet; Take 0.5 mg one daily on days 1-3 and and 0.5 mg twice daily on days 4-7.Then 1 mg twice daily for a total of 12 weeks.  -     varenicline (Chantix Continuing Month Oleg) 1 MG tablet; Take 1 tablet by mouth 2 (Two) Times a Day.  -Asked patient to wait about a week to start this medication as I want to see with the effect of Valium will be    Severe anxiety  -     diazePAM (Valium) 5 MG tablet; Take 1 tablet by mouth Every 8 (Eight) Hours As Needed for Anxiety or Sleep.  -Urged patient to take on as-needed basis, patient agreeable.  Did discuss addictive nature of benzodiazepines    Allergic rhinitis due to pollen, unspecified seasonality  -     levocetirizine (Xyzal) 5 MG tablet; Take 1 tablet by mouth Every Evening.  -New med for chronic uncontrolled problem             Argelia Mckeon MD    Please note that portions of this note were completed with a voice recognition program. Efforts were made to edit the dictations, but occasionally words are mistranscribed.

## 2020-05-17 DIAGNOSIS — F41.8 DEPRESSION WITH ANXIETY: ICD-10-CM

## 2020-05-18 ENCOUNTER — TELEMEDICINE (OUTPATIENT)
Dept: INTERNAL MEDICINE | Facility: CLINIC | Age: 55
End: 2020-05-18

## 2020-05-18 ENCOUNTER — RESULTS ENCOUNTER (OUTPATIENT)
Dept: INTERNAL MEDICINE | Facility: CLINIC | Age: 55
End: 2020-05-18

## 2020-05-18 DIAGNOSIS — M79.671 PAIN OF RIGHT HEEL: ICD-10-CM

## 2020-05-18 DIAGNOSIS — E04.9 GOITER: ICD-10-CM

## 2020-05-18 DIAGNOSIS — F41.8 DEPRESSION WITH ANXIETY: Primary | ICD-10-CM

## 2020-05-18 DIAGNOSIS — Z12.11 COLON CANCER SCREENING: ICD-10-CM

## 2020-05-18 DIAGNOSIS — F51.04 CHRONIC INSOMNIA: ICD-10-CM

## 2020-05-18 DIAGNOSIS — Z12.39 BREAST CANCER SCREENING: ICD-10-CM

## 2020-05-18 PROCEDURE — 99214 OFFICE O/P EST MOD 30 MIN: CPT | Performed by: INTERNAL MEDICINE

## 2020-05-18 RX ORDER — VILAZODONE HYDROCHLORIDE 40 MG/1
TABLET ORAL
Qty: 30 TABLET | Refills: 0 | Status: SHIPPED | OUTPATIENT
Start: 2020-05-18 | End: 2020-05-18 | Stop reason: SDUPTHER

## 2020-05-18 RX ORDER — VILAZODONE HYDROCHLORIDE 40 MG/1
40 TABLET ORAL DAILY
Qty: 90 TABLET | Refills: 3 | Status: SHIPPED | OUTPATIENT
Start: 2020-05-18 | End: 2020-10-28 | Stop reason: SDUPTHER

## 2020-05-18 RX ORDER — TRAZODONE HYDROCHLORIDE 50 MG/1
50 TABLET ORAL NIGHTLY
Qty: 30 TABLET | Refills: 2 | Status: SHIPPED | OUTPATIENT
Start: 2020-05-18 | End: 2020-09-02

## 2020-05-18 RX ORDER — DIAZEPAM 5 MG/1
5 TABLET ORAL EVERY 8 HOURS PRN
Qty: 90 TABLET | Refills: 0 | Status: SHIPPED | OUTPATIENT
Start: 2020-05-18 | End: 2020-06-23

## 2020-05-18 NOTE — PROGRESS NOTES
Subjective   Vish Russo is a 55 y.o. female seen via telehealth visit with interactive audio and video for f/u anxiety, insomnia, goiter. Also having new heel pain.  Anxiety: newly on valium and has continued on viibryd. She was having extreme anxiety with panic attacks and started valium 5mg TID. She tried to do it PRN but found she was chasing anxiety all day so took it as prescribed and found it works well. She feels focused, no over-sedation, no drowsiness or confusion. Feels more like herself now. She isn't sleeping well, however. Trazodone hasn't helped. She is interested in a new med. She has hx of goiter, US recently showed diffusely heterogenous thyroid, likely chronic thyroiditis. She was started on levothyroxine by previous PCP and unsure if she was ever dx with hypothyroidism or the levothyroxine was for the goiter. She is having new right heel pain associated with right ankle pain. Feels like she's walking on a rock, very painful. No injury. Occurring for months.    I have reviewed the following portions of the patient's history and confirmed they are accurate: current medications, past medical history, past social history and problem list     I have personally completed the patient's review of systems.    Review of Systems:  General: negative  HEENT: Throat fullness  CV: negative  Endo: Negative  Neuro: negative  Psych: Anxiety  MSK: Heel pain    Objective   There were no vitals taken for this visit.    Physical Exam   Constitutional: She is oriented to person, place, and time. She appears well-developed and well-nourished.   Pulmonary/Chest: Effort normal.   Neurological: She is alert and oriented to person, place, and time.   Skin: Skin is warm and dry.   Psychiatric: She has a normal mood and affect. Her behavior is normal. Judgment and thought content normal.   Vitals reviewed.      Assessment/Plan   Diagnoses and all orders for this visit:    Depression with anxiety  -     diazePAM (Valium) 5  MG tablet; Take 1 tablet by mouth Every 8 (Eight) Hours As Needed for Anxiety or Sleep.  -     vilazodone (Viibryd) 40 MG tablet tablet; Take 1 tablet by mouth Daily.  -much better control, will write rx for 30d supply of valium. Will do narc agreement and UDS next visit 3 months    Chronic insomnia  -     traZODone (DESYREL) 50 MG tablet; Take 1 tablet by mouth Every Night.  -new med for uncontrolled insomnia    Goiter  -discussed with pt having a hard time seeing in records if pt truly had hypothyroidism or simply a goiter. She has pending endo appt and told her she may be taken off levothyroxine, at least for a time to determine if she has true thyroid pathology or just a goiter. Told her if it was just a goiter and no dx of hypothyroidism then levothyroxine would be inappropriate    Pain of right heel  -     Ambulatory Referral to Podiatry  -new problem    Breast cancer screening  -     Mammo Screening Digital Tomosynthesis Bilateral With CAD    Colon cancer screening  -     Cologuard - Stool, Per Rectum; Future             Argelia Cha Mckeon MD    Please note that portions of this note were completed with a voice recognition program. Efforts were made to edit the dictations, but occasionally words are mistranscribed.

## 2020-06-23 DIAGNOSIS — F41.8 DEPRESSION WITH ANXIETY: ICD-10-CM

## 2020-06-23 RX ORDER — DIAZEPAM 5 MG/1
TABLET ORAL
Qty: 90 TABLET | Refills: 0 | Status: SHIPPED | OUTPATIENT
Start: 2020-06-23 | End: 2020-07-24

## 2020-07-23 DIAGNOSIS — E04.0 GOITER DIFFUSE, NONTOXIC: ICD-10-CM

## 2020-07-23 DIAGNOSIS — F41.8 DEPRESSION WITH ANXIETY: ICD-10-CM

## 2020-07-24 RX ORDER — LEVOTHYROXINE SODIUM 75 MCG
TABLET ORAL
Qty: 30 TABLET | Refills: 1 | Status: SHIPPED | OUTPATIENT
Start: 2020-07-24 | End: 2020-10-12

## 2020-07-24 RX ORDER — DIAZEPAM 5 MG/1
TABLET ORAL
Qty: 90 TABLET | Refills: 0 | Status: SHIPPED | OUTPATIENT
Start: 2020-07-24 | End: 2020-09-02

## 2020-08-10 ENCOUNTER — APPOINTMENT (OUTPATIENT)
Dept: MAMMOGRAPHY | Facility: HOSPITAL | Age: 55
End: 2020-08-10

## 2020-09-01 DIAGNOSIS — F41.8 DEPRESSION WITH ANXIETY: ICD-10-CM

## 2020-09-01 DIAGNOSIS — F51.04 CHRONIC INSOMNIA: ICD-10-CM

## 2020-09-02 RX ORDER — DIAZEPAM 5 MG/1
TABLET ORAL
Qty: 90 TABLET | Refills: 0 | Status: SHIPPED | OUTPATIENT
Start: 2020-09-02 | End: 2020-10-13

## 2020-09-02 RX ORDER — TRAZODONE HYDROCHLORIDE 50 MG/1
TABLET ORAL
Qty: 30 TABLET | Refills: 1 | Status: SHIPPED | OUTPATIENT
Start: 2020-09-02 | End: 2020-10-28

## 2020-10-01 RX ORDER — BACLOFEN 20 MG/1
TABLET ORAL
Qty: 60 TABLET | Refills: 2 | Status: SHIPPED | OUTPATIENT
Start: 2020-10-01 | End: 2021-03-08

## 2020-10-11 DIAGNOSIS — E04.0 GOITER DIFFUSE, NONTOXIC: ICD-10-CM

## 2020-10-11 DIAGNOSIS — F41.8 DEPRESSION WITH ANXIETY: ICD-10-CM

## 2020-10-13 RX ORDER — LEVOTHYROXINE SODIUM 75 MCG
TABLET ORAL
Qty: 30 TABLET | Refills: 3 | Status: SHIPPED | OUTPATIENT
Start: 2020-10-13 | End: 2021-03-08

## 2020-10-13 RX ORDER — DIAZEPAM 5 MG/1
TABLET ORAL
Qty: 90 TABLET | Refills: 0 | Status: SHIPPED | OUTPATIENT
Start: 2020-10-13 | End: 2020-12-07

## 2020-10-26 DIAGNOSIS — F41.8 DEPRESSION WITH ANXIETY: ICD-10-CM

## 2020-10-26 RX ORDER — VILAZODONE HYDROCHLORIDE 40 MG/1
40 TABLET ORAL DAILY
Qty: 90 TABLET | Refills: 3 | OUTPATIENT
Start: 2020-10-26

## 2020-10-26 NOTE — TELEPHONE ENCOUNTER
Caller: Vish Russo    Relationship: Self    Best call back number: [phone]  Medication needed:   Requested Prescriptions     Pending Prescriptions Disp Refills   • vilazodone (Viibryd) 40 MG tablet tablet 90 tablet 3     Sig: Take 1 tablet by mouth Daily.       When do you need the refill by: ASAP    What details did the patient provide when requesting the medication: HAS 1 DOSE LEFT - AT A 90 DAY SUPPLY    Does the patient have less than a 3 day supply:  [x] Yes  [] No    What is the patient's preferred pharmacy: [facility] [address] [facility] [phone] [facility] [phone] FX

## 2020-10-28 ENCOUNTER — TELEMEDICINE (OUTPATIENT)
Dept: INTERNAL MEDICINE | Facility: CLINIC | Age: 55
End: 2020-10-28

## 2020-10-28 DIAGNOSIS — G43.909 MIGRAINE WITHOUT STATUS MIGRAINOSUS, NOT INTRACTABLE, UNSPECIFIED MIGRAINE TYPE: ICD-10-CM

## 2020-10-28 DIAGNOSIS — R04.0 FREQUENT NOSEBLEEDS: ICD-10-CM

## 2020-10-28 DIAGNOSIS — F41.8 DEPRESSION WITH ANXIETY: Primary | ICD-10-CM

## 2020-10-28 DIAGNOSIS — R51.9 NEW ONSET HEADACHE: ICD-10-CM

## 2020-10-28 DIAGNOSIS — F51.04 CHRONIC INSOMNIA: ICD-10-CM

## 2020-10-28 PROCEDURE — 99214 OFFICE O/P EST MOD 30 MIN: CPT | Performed by: INTERNAL MEDICINE

## 2020-10-28 RX ORDER — DICLOFENAC POTASSIUM 50 MG/1
POWDER, FOR SOLUTION ORAL
Qty: 9 EACH | Refills: 0 | Status: SHIPPED | OUTPATIENT
Start: 2020-10-28 | End: 2020-11-13

## 2020-10-28 RX ORDER — VILAZODONE HYDROCHLORIDE 40 MG/1
40 TABLET ORAL DAILY
Qty: 90 TABLET | Refills: 3 | Status: SHIPPED | OUTPATIENT
Start: 2020-10-28 | End: 2021-01-06

## 2020-10-28 RX ORDER — TRAZODONE HYDROCHLORIDE 100 MG/1
100 TABLET ORAL NIGHTLY
Qty: 90 TABLET | Refills: 3 | Status: SHIPPED | OUTPATIENT
Start: 2020-10-28 | End: 2021-01-06

## 2020-10-28 NOTE — PROGRESS NOTES
Subjective   Vish Russo is a 55 y.o. female seen via telehealth visit with interactive audio and video for follow-up anxiety and depression, insomnia, new headaches.  Anxiety and depression: Overall well controlled.  She does want a refill of the Viibryd.  She says she is sleeping well on higher dose of the trazodone, 100 mg along with the diazepam.  She says she has lost weight and is overall feeling great except for the same headache she is experiencing.  2 weeks ago she had 4 severe headaches over the course of a few days.  She says she gets a tight spasm in both sides of the back of her neck that radiates up to her forehead.  It causes severe photosensitivity and she has had some nausea and vomiting.  She has had migraines in the past but  these are unlike any headache she has ever had.  They last a few minutes up to a couple hours.  No other neurological symptoms noted.  She also notes a few instances of nosebleeds.  She has never had hypertension before.  She does not have a blood pressure cuff    I have reviewed the following portions of the patient's history and confirmed they are accurate: allergies, current medications, past medical history, past social history and problem list     I have personally completed the patient's review of systems.    Review of Systems:  General: Intentional weight loss  HEENT: Currently negative  CV: negative  Neuro: See HPI  Psych: Insomnia    Objective   There were no vitals taken for this visit.  No vitals taken due to COVID-19, televisit    Physical Exam  Vitals signs reviewed.   Constitutional:       Appearance: She is well-developed.   Pulmonary:      Effort: Pulmonary effort is normal.   Skin:     General: Skin is warm and dry.   Neurological:      Mental Status: She is alert and oriented to person, place, and time.   Psychiatric:         Behavior: Behavior normal.         Thought Content: Thought content normal.         Judgment: Judgment normal.          Assessment/Plan   Diagnoses and all orders for this visit:    1. Depression with anxiety (Primary)  -     vilazodone (Viibryd) 40 MG tablet tablet; Take 1 tablet by mouth Daily.  Dispense: 90 tablet; Refill: 3  -Mood stable, refill medication    2. Chronic insomnia  -     traZODone (DESYREL) 100 MG tablet; Take 1 tablet by mouth Every Night.  Dispense: 90 tablet; Refill: 3  -Increased dose as patient does better on 2    3. Migraine without status migrainosus, not intractable, unspecified migraine type  -     MRI Brain With & Without Contrast  -     Diclofenac Potassium,Migraine, (Cambia) 50 MG pack; Take at onset of headache.  Dispense: 9 each; Refill: 0  -     Ambulatory Referral to Neurology  -New problem requiring work-up    4. New onset headache  -     Diclofenac Potassium,Migraine, (Cambia) 50 MG pack; Take at onset of headache.  Dispense: 9 each; Refill: 0  -     Ambulatory Referral to Neurology    5.  Frequent nosebleeds  -Would be concerned that this could potentially be due to high blood pressure though she has never carried that diagnosis.  Will need blood pressure check at some point.  Also consider allergies.           Argelia Mckeon MD    Please note that portions of this note were completed with a voice recognition program. Efforts were made to edit the dictations, but occasionally words are mistranscribed.

## 2020-11-02 ENCOUNTER — TELEPHONE (OUTPATIENT)
Dept: INTERNAL MEDICINE | Facility: CLINIC | Age: 55
End: 2020-11-02

## 2020-11-02 NOTE — TELEPHONE ENCOUNTER
Checking on the status of her medication for migraines:    Diclofenac Potassium,Migraine, (Cambia) 50 MG pack    Patient received notification from [facility] on [address] that same medication needs a PA

## 2020-11-06 ENCOUNTER — APPOINTMENT (OUTPATIENT)
Dept: MAMMOGRAPHY | Facility: HOSPITAL | Age: 55
End: 2020-11-06

## 2020-11-13 ENCOUNTER — OFFICE VISIT (OUTPATIENT)
Dept: NEUROLOGY | Facility: CLINIC | Age: 55
End: 2020-11-13

## 2020-11-13 VITALS
WEIGHT: 185 LBS | TEMPERATURE: 96.9 F | SYSTOLIC BLOOD PRESSURE: 120 MMHG | OXYGEN SATURATION: 97 % | HEART RATE: 85 BPM | HEIGHT: 65 IN | DIASTOLIC BLOOD PRESSURE: 80 MMHG | BODY MASS INDEX: 30.82 KG/M2

## 2020-11-13 DIAGNOSIS — H53.9 VISION CHANGES: Primary | ICD-10-CM

## 2020-11-13 DIAGNOSIS — G43.719 INTRACTABLE CHRONIC MIGRAINE WITHOUT AURA AND WITHOUT STATUS MIGRAINOSUS: ICD-10-CM

## 2020-11-13 PROCEDURE — 99203 OFFICE O/P NEW LOW 30 MIN: CPT | Performed by: NURSE PRACTITIONER

## 2020-11-13 RX ORDER — TOPIRAMATE 25 MG/1
TABLET ORAL
Qty: 60 TABLET | Refills: 5 | Status: SHIPPED | OUTPATIENT
Start: 2020-11-13 | End: 2020-12-11

## 2020-11-13 NOTE — PROGRESS NOTES
"Subjective:     Patient ID: Vish Russo is a 55 y.o. female.    CC:   Chief Complaint   Patient presents with   • Headache       HPI:   History of Present Illness     Ms. Russo is a very pleasant 55-year-old female here today for initial neurological evaluation for headaches.  She tells me she has a history of migraines dating back about 20 years ago.  Migraines occurred after she had a placental abruption causing loss of pregnancy and bleed leading to a coma for a few weeks.  She states for 2 years after this incident she had fairly significant migraines to the point that she would have to go into the emergency room and get infusions etc.  Headache spontaneously resolved and she has been doing well over the years.  She would have occasional episodic headache or migraine but nothing too serious or frequent.  In the past 3 to 4 months she has been noticing \"bad migraines\" frequently.  She complains of headaches that start in the occiput leading to the front of her head with associated blurred vision.  They are described as \"like hitting your funny bone\" type of pain.  She does have associated photo and phonophobia as well as nausea and vomiting with most of her headaches.  Headaches last anywhere from 1 to 5 hours, typically ibuprofen does not help, she does not use this often.  She has some Lortab left over from a dental procedure, she is taken this in the past and it did help.  She feels like she is having vision changes in general since her last headache about 5 days ago.  She feels like her eyes are blurred.  Its been over a year since her last eye exam.  She endorses intermittent paresthesias of her entire face, this can be with or without headache.  She is also complaining of weakness in her right arm ongoing for about 3 months since her headache started, this is very subtle.  She just feels like she cannot  as well with her right hand although she denies any tingling in the right arm or hand.  She " denies any confusional episodes, slurred speech or dysarthria.  She has dizziness with headaches only.  She denies syncope, tremor or weakness in her legs.  She is had no falls.  In the past years ago she took beta-blockers for her migraines, she had an anaphylactic reaction to propranolol.  She has no history of glaucoma or kidney stones  The following portions of the patient's history were reviewed and updated as appropriate: allergies, current medications, past family history, past medical history, past social history, past surgical history and problem list.    Past Medical History:   Diagnosis Date   • Kidney infection    • Peptic ulceration        Past Surgical History:   Procedure Laterality Date   • HYSTERECTOMY  04/12/1996    Partial hysterectomy       Social History     Socioeconomic History   • Marital status:      Spouse name: Not on file   • Number of children: Not on file   • Years of education: Not on file   • Highest education level: Not on file   Tobacco Use   • Smoking status: Former Smoker     Packs/day: 1.00     Types: Cigarettes   • Smokeless tobacco: Never Used   Substance and Sexual Activity   • Alcohol use: Yes     Comment: Occasional use   • Drug use: No   • Sexual activity: Not Currently       Family History   Problem Relation Age of Onset   • Arthritis Mother    • Hyperlipidemia Other    • Hypertension Other    • Liver disease Other    • Other Other         malignant neoplasm   • Heart attack Other    • Obesity Other    • Osteoporosis Other    • Breast cancer Neg Hx    • Ovarian cancer Neg Hx         Review of Systems   HENT: Negative for tinnitus, trouble swallowing and voice change.    Eyes: Positive for visual disturbance. Negative for pain, discharge, redness and itching.   Respiratory: Negative.    Cardiovascular: Negative.    Gastrointestinal: Negative.    Endocrine: Negative.    Genitourinary: Negative.    Musculoskeletal: Negative.    Skin: Negative.    Allergic/Immunologic:  "Negative.    Neurological: Positive for dizziness ( With headaches only), weakness ( Right arm), numbness ( Occasional tingling in her face) and headaches. Negative for tremors, seizures, syncope, facial asymmetry, speech difficulty and light-headedness.   Hematological: Negative.    Psychiatric/Behavioral: Negative.         Objective:  /80   Pulse 85   Temp 96.9 °F (36.1 °C)   Ht 165.1 cm (65\")   Wt 83.9 kg (185 lb)   SpO2 97%   BMI 30.79 kg/m²     Neurologic Exam     Mental Status   Oriented to person, place, and time.   Attention: normal. Concentration: normal.   Speech: speech is normal   Level of consciousness: alert  Knowledge: consistent with education.   Able to read. Able to write. Normal comprehension.     Cranial Nerves   Cranial nerves II through XII intact.     CN II   Visual fields full to confrontation.   Right visual field deficit: none  Left visual field deficit: none     CN III, IV, VI   Pupils are equal, round, and reactive to light.  Extraocular motions are normal.   Right pupil: Shape: regular. Reactivity: brisk. Consensual response: intact. Accommodation: intact.   Left pupil: Size: 3 mm. Shape: regular. Reactivity: brisk. Consensual response: intact. Accommodation: intact.   CN III: no CN III palsy  CN VI: no CN VI palsy  Nystagmus: none   Upgaze: normal  Downgaze: normal    CN V   Facial sensation intact.     CN VII   Facial expression full, symmetric.     CN VIII   CN VIII normal.     CN IX, X   CN IX normal.   CN X normal.     CN XI   CN XI normal.     CN XII   CN XII normal.     Motor Exam   Muscle bulk: normal  Overall muscle tone: normal  Right arm tone: normal  Left arm tone: normal  Right arm pronator drift: absent  Left arm pronator drift: absent  Right leg tone: normal  Left leg tone: normal    Strength   Strength 5/5 throughout.     Sensory Exam   Light touch normal.     Gait, Coordination, and Reflexes     Gait  Gait: normal    Coordination   Romberg: negative  Finger " to nose coordination: normal  Heel to shin coordination: normal  Tandem walking coordination: normal    Tremor   Resting tremor: absent  Intention tremor: absent  Action tremor: absent    Reflexes   Reflexes 2+ except as noted.   Right Suggs: absent  Left Suggs: absent      Physical Exam  Vitals signs reviewed.   Constitutional:       General: She is not in acute distress.     Appearance: She is well-developed. She is not ill-appearing or toxic-appearing.   HENT:      Head: Normocephalic and atraumatic.      Mouth/Throat:      Mouth: Mucous membranes are moist.   Eyes:      General: No scleral icterus.     Extraocular Movements: Extraocular movements intact and EOM normal.      Conjunctiva/sclera: Conjunctivae normal.      Pupils: Pupils are equal, round, and reactive to light.   Neck:      Musculoskeletal: Normal range of motion and neck supple.   Cardiovascular:      Rate and Rhythm: Normal rate.   Pulmonary:      Effort: Pulmonary effort is normal. No respiratory distress.   Skin:     General: Skin is warm.      Capillary Refill: Capillary refill takes less than 2 seconds.   Neurological:      General: No focal deficit present.      Mental Status: She is alert and oriented to person, place, and time.      Coordination: Finger-Nose-Finger Test, Heel to Shin Test and Romberg Test normal.      Gait: Gait is intact. Tandem walk normal.      Deep Tendon Reflexes: Strength normal.   Psychiatric:         Mood and Affect: Mood normal.         Speech: Speech normal.         Behavior: Behavior normal.         Thought Content: Thought content normal.         Judgment: Judgment normal.         Assessment/Plan:       Diagnoses and all orders for this visit:    1. Vision changes (Primary)  -     Ambulatory Referral to Ophthalmology  -     topiramate (Topamax) 25 MG tablet; Take 1 PO BID  Dispense: 60 tablet; Refill: 5    2. Intractable chronic migraine without aura and without status migrainosus  -     ubrogepant  (ubrogepant) 50 MG tablet; Take 1 PO at onset of migraine, may repeat in 2 hours x1 if needed  Dispense: 10 tablet; Refill: 11    This patient is here today for complaints of migrainous type headaches ongoing for about 3 to 4 months now.  She has taken beta-blockers in the past and had anaphylactic reaction.  She was given a prescription for Cambia from primary care but insurance would not cover this.  She has taken meloxicam in the past and uses this sparingly, she does not use NSAIDs regularly.  We discussed medication options, she is currently on Wellbutrin, trazodone, Viibryd and Valium.  Cannot use amitriptyline with those medications, unable to use beta-blockers due to anaphylaxis.  We will try low-dose Topamax 25 mg at night, she can increase to 25 mg twice daily or 50 mg at night for headache prevention.  She had an MRI of the brain ordered to her primary care, this was apparently approved but central scheduling was unable to get in touch with her to schedule the test.  She tells me that she did speak with them yesterday and that she was planning to call and get this scheduled after seeing me.  I do recommend that she call today and get her MRI of the brain with and without contrast scheduled, scan will rule out stroke, tumor or lesion.  She will call and let me know when she has her MRI so that I can check for the report as it will not come to my in basket, PCP ordered the test.  I do recommend that she have an eye exam as she is complained of some visual changes over the past several months.  Referral placed to ophthalmology  For abortive treatment of her migraines I have given her a prescription for Ubrelvy, she has used triptans in the past which caused some chest discomfort  I would like to see her back in about 4 to 6 weeks or sooner if needed, we have discussed that Topamax will take some time to get into her system to be fully therapeutic, she is understanding of this.  I have encouraged her to  notify my office with any questions concerns or problems prior to her follow-up appointment  We reviewed possible headache triggers, stress relief techniques, and alternative treatments for headaches. The patient was in understanding and all questions were addressed. We reviewed the diagnosis and treatment plan and medication side effect profile. The patient will follow up as scheduled.  To ER with worst headache of her life etc.         Reviewed medications, potential side effects and signs and symptoms to report. Discussed risk versus benefits of treatment plan with patient and/or family-including medications, labs and radiology that may be ordered. Addressed questions and concerns during visit. Patient and/or family verbalized understanding and agree with plan.    AS THE PROVIDER, I PERSONALLY WORE PPE DURING ENTIRE FACE TO FACE ENCOUNTER IN CLINIC WITH THE PATIENT. PATIENT ALSO WORE PPE DURING ENTIRE FACE TO FACE ENCOUNTER EXCEPT FOR A MAX OF 30 SECONDS DURING NEUROLOGICAL EVALUATION OF CRANIAL NERVES AND THEN MASK WAS PLACED BACK OVER PATIENT FACE FOR REMAINDER OF VISIT. I WASHED MY HANDS BEFORE AND AFTER VISIT.    During this visit the following were done:  Labs Reviewed []    Labs Ordered []    Radiology Reports Reviewed []    Radiology Ordered []    PCP Records Reviewed []    Referring Provider Records Reviewed []    ER Records Reviewed []    Hospital Records Reviewed []    History Obtained From Family []    Radiology Images Reviewed []    Other Reviewed []    Records Requested []      Jennie Tyson, CHIP  11/13/2020

## 2020-11-17 NOTE — TELEPHONE ENCOUNTER
Prior Authorization for Cambia 50mg packets completed and sent to plan via Covermymeds. Status pending;   KEY: [id number]

## 2020-11-25 ENCOUNTER — APPOINTMENT (OUTPATIENT)
Dept: MRI IMAGING | Facility: HOSPITAL | Age: 55
End: 2020-11-25

## 2020-12-04 DIAGNOSIS — F41.8 DEPRESSION WITH ANXIETY: ICD-10-CM

## 2020-12-07 RX ORDER — DIAZEPAM 5 MG/1
TABLET ORAL
Qty: 90 TABLET | Refills: 3 | Status: SHIPPED | OUTPATIENT
Start: 2020-12-07 | End: 2021-05-14

## 2020-12-08 ENCOUNTER — HOSPITAL ENCOUNTER (OUTPATIENT)
Dept: MRI IMAGING | Facility: HOSPITAL | Age: 55
Discharge: HOME | End: 2020-12-08

## 2020-12-08 ENCOUNTER — HOSPITAL ENCOUNTER (OUTPATIENT)
Dept: GENERAL RADIOLOGY | Facility: HOSPITAL | Age: 55
Discharge: HOME | End: 2020-12-08

## 2020-12-08 DIAGNOSIS — Z53.09 MRI CONTRAINDICATED DUE TO METAL IMPLANT: ICD-10-CM

## 2020-12-08 PROCEDURE — A9577 INJ MULTIHANCE: HCPCS | Performed by: INTERNAL MEDICINE

## 2020-12-08 PROCEDURE — 70553 MRI BRAIN STEM W/O & W/DYE: CPT

## 2020-12-08 PROCEDURE — 73070 X-RAY EXAM OF ELBOW: CPT

## 2020-12-08 PROCEDURE — 0 GADOBENATE DIMEGLUMINE 529 MG/ML SOLUTION: Performed by: INTERNAL MEDICINE

## 2020-12-08 RX ADMIN — GADOBENATE DIMEGLUMINE 15 ML: 529 INJECTION, SOLUTION INTRAVENOUS at 19:32

## 2020-12-09 ENCOUNTER — TELEPHONE (OUTPATIENT)
Dept: NEUROLOGY | Facility: CLINIC | Age: 55
End: 2020-12-09

## 2020-12-09 NOTE — TELEPHONE ENCOUNTER
Please let her know that her MRI was read as normal,   please get a copy of her eye exam note  For her headaches?  If she still having continued headaches I can do a vessel scan of her brain

## 2020-12-09 NOTE — TELEPHONE ENCOUNTER
PT CALLED IN TODAY REGARDING HER MRI OF BRAIN SHE HAD DONE. SHE STATES JORGE LUIS WANTED HER TO CALL AND LET HER KNOW WHENEVER SHE COMPLETED HER MRI AND SHE COMPLETED IT ON 12/8/20 AT [facility]. RESULTS ARE IN CHART.    PT ALSO WANTED TO LET HER KNOW SHE HAS HAD HER APPT WITH [facility] AND EVERYTHING SEEMED TO GO WELL.     CALL BACK- [phone]

## 2020-12-11 ENCOUNTER — TELEMEDICINE (OUTPATIENT)
Dept: NEUROLOGY | Facility: CLINIC | Age: 55
End: 2020-12-11

## 2020-12-11 DIAGNOSIS — G43.719 INTRACTABLE CHRONIC MIGRAINE WITHOUT AURA AND WITHOUT STATUS MIGRAINOSUS: Primary | ICD-10-CM

## 2020-12-11 PROCEDURE — 99441 PR PHYS/QHP TELEPHONE EVALUATION 5-10 MIN: CPT | Performed by: NURSE PRACTITIONER

## 2020-12-11 RX ORDER — TOPIRAMATE 50 MG/1
TABLET, FILM COATED ORAL
Qty: 45 TABLET | Refills: 11 | Status: SHIPPED | OUTPATIENT
Start: 2020-12-11 | End: 2021-07-28

## 2020-12-11 NOTE — PROGRESS NOTES
"Subjective:     Patient ID: Vish Russo is a 55 y.o. female.    CC:   Chief Complaint   Patient presents with   • Headache       HPI:   History of Present Illness     Ms. Russo was scheduled today for my chart video visit, she was unable to connect appropriately so I called patient and we move forward with a telephone visit with consent  She is here for follow-up.  I first saw her to start a month ago for evaluation for headaches.    She told me she has a history of migraines dating back about 20 years ago.  Migraines occurred after she had a placental abruption causing loss of pregnancy and bleed leading to a coma for a few weeks.  She said that for 2 years after this incident she had fairly significant migraines to the point that she would have to go into the emergency room and get infusions etc.  Headache spontaneously resolved and she has been doing well over the years.  She would have occasional episodic headache or migraine but nothing too serious or frequent.      In the past 3 to 4 months she has been noticing \"bad migraines\" frequently.  She complained of headaches that start in the occiput leading to the front of her head with associated blurred vision.  They are described as \"like hitting your funny bone\" type of pain.  She does have associated photo and phonophobia as well as nausea and vomiting with most of her headaches.  Headaches last anywhere from 1 to 5 hours, typically ibuprofen does not help, she does not use this often.  She had some Lortab left over from a dental procedure, she is taken this in the past and it did help.  She felt like she was having vision changes in general since her last headache about 5 days prior to her first visit.  She fell like her eyes are blurred.    It had been over a year since her last eye exam, I did refer her for eye exam after first visit.  She had eye exam to [facility] and was told everything was normal.  We have called for this clinic note, " we have not yet received..      She did complain of intermittent paresthesias of her entire face, this can be with or without headache.  She is also complaining of weakness in her right arm ongoing for about 3 months since her headache started, this is very subtle.  she denies any tingling in the right arm or hand.  She denied any confusional episodes, slurred speech or dysarthria.  She has dizziness with headaches only.  She denied syncope, tremor or weakness in her legs.  She is had no falls.  In the past years ago she took beta-blockers for her migraines, she had an anaphylactic reaction to propranolol.  She also has an allergy to Imitrex.    At last visit I started her on Topamax 25 mg with instruction to increase to 50 mg a day.  She only increased to 50 mg less than a week ago.  She has not noticed much of a difference overall in her migraines.  She had MRI of the brain ordered through primary care, this was read as normal.  Insurance apparently did not cover Ubrelvy  The following portions of the patient's history were reviewed and updated as appropriate: allergies, current medications, past family history, past medical history, past social history, past surgical history and problem list.    Past Medical History:   Diagnosis Date   • Kidney infection    • Peptic ulceration        Past Surgical History:   Procedure Laterality Date   • HYSTERECTOMY  04/12/1996    Partial hysterectomy       Social History     Socioeconomic History   • Marital status:      Spouse name: Not on file   • Number of children: Not on file   • Years of education: Not on file   • Highest education level: Not on file   Tobacco Use   • Smoking status: Former Smoker     Packs/day: 1.00     Types: Cigarettes   • Smokeless tobacco: Never Used   Substance and Sexual Activity   • Alcohol use: Yes     Comment: Occasional use   • Drug use: No   • Sexual activity: Not Currently       Family History   Problem Relation Age of Onset   •  Arthritis Mother    • Hyperlipidemia Other    • Hypertension Other    • Liver disease Other    • Other Other         malignant neoplasm   • Heart attack Other    • Obesity Other    • Osteoporosis Other    • Breast cancer Neg Hx    • Ovarian cancer Neg Hx         Review of Systems   Constitutional: Negative.    Eyes: Negative.    Respiratory: Negative.    Cardiovascular: Negative.    Neurological: Positive for numbness and headaches. Negative for dizziness, tremors, seizures, syncope, facial asymmetry, speech difficulty, weakness and light-headedness.        Objective:  There were no vitals taken for this visit.    Neurologic Exam     Mental Status   Oriented to person, place, and time.   Level of consciousness: alert  Exam limited as this is a telephone visit.  She was originally scheduled for video visit but she was unable to connect.  Patient is alert and oriented and her speech is clear and articulate       Physical Exam  Neurological:      Mental Status: She is oriented to person, place, and time.         Assessment/Plan:       Diagnoses and all orders for this visit:    1. Intractable chronic migraine without aura and without status migrainosus (Primary)  -     topiramate (TOPAMAX) 50 MG tablet; Take 25 mg in am and 50 mg at night  Dispense: 45 tablet; Refill: 11    She has only recently increased her Topamax to 50 mg daily, I have instructed her in about a week to 2 weeks she may add a 25 mg in the morning and continue 50 mg at night.  I have left her samples of Ubrelvy at the window, we will try to check the status of Ubrelvy covered through her insurance as she does have a listed allergy to triptans  I will see her back in about 6 to 8 weeks, she will contact me with questions prior to follow up.         Reviewed medications, potential side effects and signs and symptoms to report. Discussed risk versus benefits of treatment plan with patient and/or family-including medications, labs and radiology that may  be ordered. Addressed questions and concerns during visit. Patient and/or family verbalized understanding and agree with plan.        Jennie Tyson, CHIP  12/11/2020

## 2020-12-17 ENCOUNTER — TELEPHONE (OUTPATIENT)
Dept: NEUROLOGY | Facility: CLINIC | Age: 55
End: 2020-12-17

## 2020-12-17 DIAGNOSIS — G44.209 TENSION HEADACHE: ICD-10-CM

## 2020-12-17 DIAGNOSIS — G43.009 MIGRAINE WITHOUT AURA AND WITHOUT STATUS MIGRAINOSUS, NOT INTRACTABLE: Primary | ICD-10-CM

## 2020-12-17 NOTE — TELEPHONE ENCOUNTER
"Pt called in stating she has had a terrible migraine from 7pm to 1:30pm today with maybe an hour without and her daughter works in the ER and she told her that in the ER they prescribe patients \"fiorecets\" (unsure on spelling) for migraine, states she doesn't want to go to the emergency room because she knows everyone is so busy but if shyam could call in this prescription for her. States she knows that her beta-blocker still hasn't been approved but she desperately needs something to help. Her migraine has now calmed down to a headache.     Please advise and call patient   "

## 2020-12-18 RX ORDER — BUTALBITAL, ACETAMINOPHEN AND CAFFEINE 50; 325; 40 MG/1; MG/1; MG/1
1 CAPSULE ORAL EVERY 6 HOURS PRN
Qty: 10 CAPSULE | Refills: 0 | Status: SHIPPED | OUTPATIENT
Start: 2020-12-18 | End: 2021-10-28

## 2020-12-18 NOTE — TELEPHONE ENCOUNTER
Vish said that she does not have anyone that can come  the samples today. She was incapacitated for 13 hours and just needed some relief. She asked if you could just send in a few. She does not want to go to the ED. She will try to get the samples on Monday.

## 2020-12-18 NOTE — TELEPHONE ENCOUNTER
I sent fioricet #10 to pharmacy with 0 refills per Radha Santos requests. I reviewed mike in epic. Thanks, CHIP Combs

## 2020-12-18 NOTE — TELEPHONE ENCOUNTER
Suzette called me and ask me to ask you if you could send in a few fiorcet to get pt through the weekend?

## 2020-12-29 ENCOUNTER — TELEPHONE (OUTPATIENT)
Dept: NEUROLOGY | Facility: CLINIC | Age: 55
End: 2020-12-29

## 2020-12-29 NOTE — TELEPHONE ENCOUNTER
She did not  her samples but I was also calling her to let her know her Ubrelvy was approved by her ins.

## 2020-12-29 NOTE — TELEPHONE ENCOUNTER
Provider: JORGE LUIS HUTCHINSON  Caller: MIRIAM BANDA  Relationship to Patient: SELF    Phone Number:[phone]  Reason for Call: PT CALLED STATED SHE HAD MISSED A CALL THOUGHT SHE MAY HAVE FORGOT TO  SAMPLES. SHE STATED HER FRIEND SUDHA MARTINES IS COMING TO OFFICE TO  BET BLOCKER SAMPLES FOR HER .     THANK YOU

## 2020-12-29 NOTE — TELEPHONE ENCOUNTER
JEN:  Patient came by to get the samples for Ubrelvy. I did tell her that it is approved now. She wanted to let Esmer know that the topamax has not made any difference with her symptoms and she has been on it for over 6 weeks. She is going to stop it due to the side effects. She is having a lot of tingling in her face and some memory issues. She said the thing that helps the most is the fioricet.

## 2020-12-30 NOTE — TELEPHONE ENCOUNTER
We are somewhat limited as to what we can use due to her other medications  Is she willing to try an injection once a month such as Ajovy?

## 2020-12-31 DIAGNOSIS — F41.8 DEPRESSION WITH ANXIETY: ICD-10-CM

## 2020-12-31 RX ORDER — BUPROPION HYDROCHLORIDE 300 MG/1
TABLET ORAL
Qty: 30 TABLET | Refills: 4 | Status: SHIPPED | OUTPATIENT
Start: 2020-12-31 | End: 2021-05-14

## 2021-01-06 ENCOUNTER — TELEMEDICINE (OUTPATIENT)
Dept: INTERNAL MEDICINE | Facility: CLINIC | Age: 56
End: 2021-01-06

## 2021-01-06 DIAGNOSIS — F51.04 CHRONIC INSOMNIA: ICD-10-CM

## 2021-01-06 DIAGNOSIS — F41.8 DEPRESSION WITH ANXIETY: Primary | ICD-10-CM

## 2021-01-06 PROCEDURE — 99214 OFFICE O/P EST MOD 30 MIN: CPT | Performed by: INTERNAL MEDICINE

## 2021-01-06 RX ORDER — TRAZODONE HYDROCHLORIDE 100 MG/1
150 TABLET ORAL NIGHTLY
Qty: 45 TABLET | Refills: 11 | Status: SHIPPED | OUTPATIENT
Start: 2021-01-06 | End: 2021-12-22 | Stop reason: SDUPTHER

## 2021-01-06 RX ORDER — VILAZODONE HYDROCHLORIDE 40 MG/1
60 TABLET ORAL DAILY
Qty: 45 TABLET | Refills: 11 | Status: SHIPPED | OUTPATIENT
Start: 2021-01-06 | End: 2021-07-28 | Stop reason: SDUPTHER

## 2021-01-06 NOTE — PROGRESS NOTES
"Subjective   Vish Russo is a 55 y.o. female here for follow-up via telehealth with interactive audio and video for anxiety and depression, insomnia.  She had been doing very well on the Viibryd, Wellbutrin, diazepam.  She says over the last month she feels \"like I hit a wall.\"  She says she is suffering both from anxiety and depression though she does not know why they have suddenly worsened.  She said her daughter recently got engaged so she was excited about that.  She is still mostly staying at home.  Nothing socially has changed.  She is compliant with her medications.  She thinks medication needs to be changed.  She is not sleeping well.  She is on trazodone 100 mg and she takes that with the Valium and she is having trouble staying asleep.  She wonders if the trazodone could be increased as well as the Viibryd.    I have reviewed the following portions of the patient's history and confirmed they are accurate: current medications, past medical history, past social history and problem list     I have personally completed the patient's review of systems.    Review of Systems:  General: negative  Neuro: negative  Psych: See HPI    Objective   There were no vitals taken for this visit.  No vitals done due to televisit    Physical Exam  Constitutional:       Appearance: Normal appearance.   Neurological:      General: No focal deficit present.      Mental Status: She is alert and oriented to person, place, and time.   Psychiatric:         Mood and Affect: Mood normal.         Behavior: Behavior normal.         Thought Content: Thought content normal.         Judgment: Judgment normal.         Assessment/Plan   Diagnoses and all orders for this visit:    1. Depression with anxiety (Primary)  -     vilazodone (Viibryd) 40 MG tablet tablet; Take 1.5 tablets by mouth Daily for 30 days.  Dispense: 45 tablet; Refill: 11  -Increase Viibryd due to uncontrolled anxiety and depression.  Follow-up 2 weeks.  May need to " increase diazepam as well as she does have a significant anxiety component     2. Chronic insomnia  -     traZODone (DESYREL) 100 MG tablet; Take 1.5 tablets by mouth Every Night for 30 days.  Dispense: 45 tablet; Refill: 11  -Increase trazodone to 150 mg due to uncontrolled insomnia          You have chosen to receive care through a telehealth visit.  Do you consent to use a video/audio connection for your medical care today? Yes      Argelia Mckeon MD    Please note that portions of this note were completed with a voice recognition program. Efforts were made to edit the dictations, but occasionally words are mistranscribed.

## 2021-03-03 ENCOUNTER — TELEMEDICINE (OUTPATIENT)
Dept: INTERNAL MEDICINE | Facility: CLINIC | Age: 56
End: 2021-03-03

## 2021-03-03 DIAGNOSIS — F41.8 DEPRESSION WITH ANXIETY: Primary | ICD-10-CM

## 2021-03-03 DIAGNOSIS — G43.909 MIGRAINE WITHOUT STATUS MIGRAINOSUS, NOT INTRACTABLE, UNSPECIFIED MIGRAINE TYPE: ICD-10-CM

## 2021-03-03 DIAGNOSIS — F51.04 CHRONIC INSOMNIA: ICD-10-CM

## 2021-03-03 PROCEDURE — 99214 OFFICE O/P EST MOD 30 MIN: CPT | Performed by: INTERNAL MEDICINE

## 2021-03-03 NOTE — PROGRESS NOTES
Subjective   Vish Russo is a 55 y.o. female here for follow-up via telehealth visit with interactive audio and video for anxiety depression, migraine, insomnia.  Anxiety and depression: Patient just learned today that her uncle passed away from Covid so patient is a bit depressed today.  She says overall with the changes in medication her mood is better.  She says she has some ups and downs which she anticipates but does feel the changes made in medications have been positive.  She denies any negative side effects medication.  She is sleeping better on the increased dose of trazodone, 150 mg.  Denies waking up feeling groggy.  Migraines: Seeing neurology, improved on current medication.    I have reviewed the following portions of the patient's history and confirmed they are accurate: current medications, past medical history, past social history and problem list     I have personally completed the patient's review of systems.    Review of Systems:  General: negative  Eyes: Negative  Neuro: Headaches  Psych: Grief, insomnia    Objective   There were no vitals taken for this visit.  No VS done due to televisit  Physical Exam  Vitals signs reviewed.   Constitutional:       Appearance: She is well-developed.   Pulmonary:      Effort: Pulmonary effort is normal.   Neurological:      Mental Status: She is alert and oriented to person, place, and time.   Psychiatric:         Behavior: Behavior normal.         Thought Content: Thought content normal.         Judgment: Judgment normal.         Assessment/Plan   Diagnoses and all orders for this visit:    1. Depression with anxiety (Primary)  -Mood improved to though having acute grief which complicates mood.  Continue current medications for now    2. Migraine without status migrainosus, not intractable, unspecified migraine type  -Improved, managed by neurology    3. Chronic insomnia  -Improved, continue trazodone at 150 mg    You have chosen to receive care through a  telehealth visit.  Do you consent to use a video/audio connection for your medical care today? Yes           Argelia Mckeon MD    Please note that portions of this note were completed with a voice recognition program. Efforts were made to edit the dictations, but occasionally words are mistranscribed.

## 2021-03-06 DIAGNOSIS — Z72.0 NICOTINE ABUSE: ICD-10-CM

## 2021-03-06 DIAGNOSIS — J30.89 PERENNIAL ALLERGIC RHINITIS: ICD-10-CM

## 2021-03-06 DIAGNOSIS — E04.0 GOITER DIFFUSE, NONTOXIC: ICD-10-CM

## 2021-03-08 RX ORDER — BACLOFEN 20 MG/1
TABLET ORAL
Qty: 60 TABLET | Refills: 5 | Status: SHIPPED | OUTPATIENT
Start: 2021-03-08 | End: 2022-03-15

## 2021-03-08 RX ORDER — VARENICLINE TARTRATE 1 MG/1
TABLET, FILM COATED ORAL
Qty: 56 TABLET | Refills: 1 | Status: SHIPPED | OUTPATIENT
Start: 2021-03-08 | End: 2021-10-21

## 2021-03-08 RX ORDER — LEVOTHYROXINE SODIUM 75 MCG
TABLET ORAL
Qty: 30 TABLET | Refills: 5 | Status: SHIPPED | OUTPATIENT
Start: 2021-03-08 | End: 2021-10-13

## 2021-03-08 RX ORDER — FLUTICASONE PROPIONATE 50 MCG
SPRAY, SUSPENSION (ML) NASAL
Qty: 15.8 ML | Refills: 5 | Status: SHIPPED | OUTPATIENT
Start: 2021-03-08 | End: 2022-02-07

## 2021-04-21 ENCOUNTER — OFFICE VISIT (OUTPATIENT)
Dept: INTERNAL MEDICINE | Facility: CLINIC | Age: 56
End: 2021-04-21

## 2021-04-21 VITALS
WEIGHT: 182 LBS | BODY MASS INDEX: 30.32 KG/M2 | TEMPERATURE: 97.6 F | HEIGHT: 65 IN | DIASTOLIC BLOOD PRESSURE: 64 MMHG | SYSTOLIC BLOOD PRESSURE: 118 MMHG

## 2021-04-21 DIAGNOSIS — M76.61 ACHILLES TENDINITIS OF RIGHT LOWER EXTREMITY: Primary | ICD-10-CM

## 2021-04-21 PROCEDURE — 99214 OFFICE O/P EST MOD 30 MIN: CPT | Performed by: INTERNAL MEDICINE

## 2021-04-21 RX ORDER — TRAMADOL HYDROCHLORIDE 50 MG/1
50 TABLET ORAL EVERY 6 HOURS PRN
Qty: 12 TABLET | Refills: 1 | Status: SHIPPED | OUTPATIENT
Start: 2021-04-21 | End: 2021-10-28

## 2021-04-21 RX ORDER — METHYLPREDNISOLONE 4 MG/1
TABLET ORAL
Qty: 21 EACH | Refills: 0 | Status: SHIPPED | OUTPATIENT
Start: 2021-04-21 | End: 2021-07-28

## 2021-04-21 NOTE — PROGRESS NOTES
"Subjective   Vish Russo is a 56 y.o. female here for a month of right heel and ankle pain.  She denies any injury.  Says it started out on the medial heel, noticed some swelling.  Now it is radiated onto the back of the heel as well as up the Achilles.  She says it is extremely tender to touch.  She had trouble getting in in the boot today.  She has been wearing crocs thong sandals which is the only way it feels comfortable.  She has noticed swelling on the medial ankle.  It does hurt when she flexes the ankle.  Never had anything like this before.  No erythema or neurological symptoms.    I have reviewed the following portions of the patient's history and confirmed they are accurate: current medications, past medical history, past social history and problem list     I have personally completed the patient's review of systems.    Review of Systems:  General: negative  Skin: Negative  Neuro: negative  MSK: See HPI    Objective   /64   Temp 97.6 °F (36.4 °C) (Temporal)   Ht 165.1 cm (65\")   Wt 82.6 kg (182 lb)   BMI 30.29 kg/m²     Physical Exam  Vitals reviewed.   Constitutional:       Appearance: She is well-developed.   Pulmonary:      Effort: Pulmonary effort is normal.   Musculoskeletal:      Right foot: Normal range of motion.   Feet:      Right foot:      Skin integrity: Skin integrity normal.      Toenail Condition: Right toenails are normal.      Comments: Swelling over the medial right heel, very tender to palpation in that area, near the medial malleolus and over the Achilles  Skin:     General: Skin is warm and dry.   Neurological:      Mental Status: She is alert and oriented to person, place, and time.   Psychiatric:         Behavior: Behavior normal.         Thought Content: Thought content normal.         Judgment: Judgment normal.         Assessment/Plan   Diagnoses and all orders for this visit:    1. Achilles tendinitis of right lower extremity (Primary)  -     methylPREDNISolone " (MEDROL) 4 MG dose pack; Take as directed on package instructions.  Dispense: 21 each; Refill: 0  -     Ambulatory Referral to Podiatry  -     traMADol (ULTRAM) 50 MG tablet; Take 1 tablet by mouth Every 6 (Six) Hours As Needed for Moderate Pain .  Dispense: 12 tablet; Refill: 1  -meds as above, advised pt to wear supportive shoes (ankle support and good cushion) as much as she can to help heal the foot             Ellie Campbell MA    Please note that portions of this note were completed with a voice recognition program. Efforts were made to edit the dictations, but occasionally words are mistranscribed.

## 2021-04-28 ENCOUNTER — OFFICE VISIT (OUTPATIENT)
Dept: INTERNAL MEDICINE | Facility: CLINIC | Age: 56
End: 2021-04-28

## 2021-04-28 DIAGNOSIS — M25.571 ACUTE RIGHT ANKLE PAIN: Primary | ICD-10-CM

## 2021-04-28 DIAGNOSIS — M79.671 FOOT PAIN, RIGHT: ICD-10-CM

## 2021-04-28 PROCEDURE — 99214 OFFICE O/P EST MOD 30 MIN: CPT | Performed by: INTERNAL MEDICINE

## 2021-04-28 RX ORDER — HYDROCODONE BITARTRATE AND ACETAMINOPHEN 7.5; 325 MG/1; MG/1
1 TABLET ORAL EVERY 6 HOURS PRN
Qty: 12 TABLET | Refills: 0 | Status: SHIPPED | OUTPATIENT
Start: 2021-04-28 | End: 2021-07-28

## 2021-04-28 NOTE — PROGRESS NOTES
"Subjective   Vish Russo is a 56 y.o. female here for follow-up via telephone with audio only acute right ankle and foot pain.  She says since last visit a week ago the pain is actually worse.  She says if she puts any pressure on the foot at all she gets a searing pain in the heel that radiates up to the back of her calf.  She has noticed some discoloration on the calf and redness over the top of the foot.  She thinks the calf discoloration might be new.  She says the only time it does not hurt is when she is dangling the foot or \"resting it gingerly on the floor.\" Sh does not think the steroids helped at all.  She says the tramadol did nothing, \"it was like taking a baby aspirin.\" She is in so much pain at times that she cries.    I have reviewed the following portions of the patient's history and confirmed they are accurate: current medications, past medical history, past social history and problem list     I have personally completed the patient's review of systems.    Review of Systems:  General: negative  Neuro: negative  MSK: See HPI  Skin: Discoloration    Objective   There were no vitals taken for this visit.  No VS done due to telephone visit  Physical Exam  No PE done due to telephone visit    Assessment/Plan   Diagnoses and all orders for this visit:    1. Acute right ankle pain (Primary)  -     Ambulatory Referral to Orthopedic Surgery  -     HYDROcodone-acetaminophen (Norco) 7.5-325 MG per tablet; Take 1 tablet by mouth Every 6 (Six) Hours As Needed for Moderate Pain .  Dispense: 12 tablet; Refill: 0  -Worsened    2. Foot pain, right  -     Ambulatory Referral to Orthopedic Surgery  -     HYDROcodone-acetaminophen (Norco) 7.5-325 MG per tablet; Take 1 tablet by mouth Every 6 (Six) Hours As Needed for Moderate Pain .  Dispense: 12 tablet; Refill: 0          This visit has been rescheduled as a phone visit to comply with patient safety concerns in accordance with CDC recommendations. Total time of " discussion was 10 minutes.    You have chosen to receive care through a telephone visit. Do you consent to use a telephone visit for your medical care today? Yes      Argelia Mckeon MD    Please note that portions of this note were completed with a voice recognition program. Efforts were made to edit the dictations, but occasionally words are mistranscribed.

## 2021-04-29 ENCOUNTER — TELEPHONE (OUTPATIENT)
Dept: INTERNAL MEDICINE | Facility: CLINIC | Age: 56
End: 2021-04-29

## 2021-04-29 NOTE — TELEPHONE ENCOUNTER
Caller: Vish Russo    Relationship: Self    Best call back number: [phone]     What is the medical concern/diagnosis: RIGHT FOOT ANKLE PAIN    What specialty or service is being requested: ORTHOPEDIC SURGEON / PODIATRIST    What is the provider, practice or medical service name: ORTHOPEDIC IS WITHIN DR [facility] AND SHE WILL SEE ELIZABETH MORE.  SHE IS UNSURE ABOUT THE PODIATRIST.     Any additional details: THE PATIENT STATES THAT THE ORTHOPEDIC SURGEON CANNOT SEE HER UNTIL 07/02/2021 AND THE PATIENT STATES THAT DR SIMS TOLD THE PATIENT THIS REFERRAL WOULD BE MARKED URGENT. THE PATIENT IS REQUESTING A CALL BACK TO DISCUSS IF IT IS POSSIBLE TO GET IN SOONER AND WHAT SHE CAN BE DOING IN THE MEANTIME, LIKE PERHAPS A BOOT. THE PATIENT STATES SHE WAS TOLD BY DR ANGULO OFFICE TO CALL BACK EVERY Friday TO SEE IF THERE HAS BEEN A CANCELLATION.       PLEASE CALL AND ADVISE THE PATIENT -[phone]

## 2021-04-30 ENCOUNTER — TELEPHONE (OUTPATIENT)
Dept: ORTHOPEDIC SURGERY | Facility: CLINIC | Age: 56
End: 2021-04-30

## 2021-04-30 DIAGNOSIS — M79.671 FOOT PAIN, RIGHT: ICD-10-CM

## 2021-04-30 DIAGNOSIS — M25.571 ACUTE RIGHT ANKLE PAIN: Primary | ICD-10-CM

## 2021-04-30 NOTE — TELEPHONE ENCOUNTER
Left voicemail with patient that Elisa Graves has a cancellation today at 11:00 AM. If patient calls back and appointment is still available we can see her.

## 2021-04-30 NOTE — TELEPHONE ENCOUNTER
"I did put it in as urgent. Not sure why \"urgent\" meant July. Tell her I want her to go to podiatry, much faster. There's already a RF in there. She may need to talk to Susie.  "

## 2021-05-06 ENCOUNTER — PRIOR AUTHORIZATION (OUTPATIENT)
Dept: NEUROLOGY | Facility: CLINIC | Age: 56
End: 2021-05-06

## 2021-05-14 DIAGNOSIS — F41.8 DEPRESSION WITH ANXIETY: ICD-10-CM

## 2021-05-14 RX ORDER — DIAZEPAM 5 MG/1
TABLET ORAL
Qty: 90 TABLET | Refills: 2 | Status: SHIPPED | OUTPATIENT
Start: 2021-05-14 | End: 2021-09-14

## 2021-05-14 RX ORDER — BUPROPION HYDROCHLORIDE 300 MG/1
TABLET ORAL
Qty: 90 TABLET | Refills: 3 | Status: SHIPPED | OUTPATIENT
Start: 2021-05-14 | End: 2022-03-09

## 2021-07-02 ENCOUNTER — TELEPHONE (OUTPATIENT)
Dept: ORTHOPEDIC SURGERY | Facility: CLINIC | Age: 56
End: 2021-07-02

## 2021-07-02 NOTE — TELEPHONE ENCOUNTER
CALLED PATIENT TO RESCHEDULE 7/2/21 NO SHOW APPOINTMENT WITH [facility]IZABETH MORE. PATIENT STATED THAT SHE HAD A BAD WEEK AND FORGOT, SHE SAID SHE WILL CALL BACK AT LATER TIME TO RESCHEDULE

## 2021-07-28 ENCOUNTER — TELEMEDICINE (OUTPATIENT)
Dept: INTERNAL MEDICINE | Facility: CLINIC | Age: 56
End: 2021-07-28

## 2021-07-28 DIAGNOSIS — M25.522 PAIN OF BOTH ELBOWS: ICD-10-CM

## 2021-07-28 DIAGNOSIS — M25.511 CHRONIC RIGHT SHOULDER PAIN: ICD-10-CM

## 2021-07-28 DIAGNOSIS — F41.8 DEPRESSION WITH ANXIETY: ICD-10-CM

## 2021-07-28 DIAGNOSIS — M25.521 PAIN OF BOTH ELBOWS: ICD-10-CM

## 2021-07-28 DIAGNOSIS — R41.3 MEMORY LOSS: Primary | ICD-10-CM

## 2021-07-28 DIAGNOSIS — G89.29 CHRONIC RIGHT SHOULDER PAIN: ICD-10-CM

## 2021-07-28 DIAGNOSIS — F41.9 ANXIETY: ICD-10-CM

## 2021-07-28 PROCEDURE — 99214 OFFICE O/P EST MOD 30 MIN: CPT | Performed by: INTERNAL MEDICINE

## 2021-07-28 RX ORDER — VILAZODONE HYDROCHLORIDE 40 MG/1
80 TABLET ORAL DAILY
Qty: 60 TABLET | Refills: 11 | Status: SHIPPED | OUTPATIENT
Start: 2021-07-28 | End: 2021-10-28

## 2021-07-28 NOTE — PROGRESS NOTES
Subjective   Vish Russo is a 56 y.o. female here via telehealth with interactive audio and video for anxiety and depression, memory loss, shoulder and elbow pain.  Patient says she was recently exposed to Covid so she changed her appointment to video.  She denies any symptoms today.  She says she is really concerned about her memory.  She says during conversation she will forget what they are talking about several times and has to ask what they have been speaking about.  She also reports getting lost while driving, forgetting what she is doing.  She does endorse depression and anxiety.  She says she is not taking her Valium as much as her Viibryd was increased.  She is not taking Topamax though it was reported in her chart and her last neurology note which was reviewed.  She denies any early dementias in her family.  She would like to be evaluated by a different neurologist.  She was seeing [facility] for migraines which she says have essentially resolved.  She also complains of pain in both elbows, seems to radiate from the wrist and is a burning sensation.  Also has chronic right shoulder pain she would like to see Ortho for.    I have reviewed the following portions of the patient's history and confirmed they are accurate: current medications, past medical history, past social history and problem list     I have personally completed the patient's review of systems.    Review of Systems:  General: negative  MSK: See HPI  Neuro: Memory loss  Psych: Anxiety    Objective   There were no vitals taken for this visit.  No VS done due to tele visit    Physical Exam  Vitals reviewed.   Constitutional:       Appearance: She is well-developed.   Pulmonary:      Effort: Pulmonary effort is normal.   Neurological:      General: No focal deficit present.      Mental Status: She is alert and oriented to person, place, and time.   Psychiatric:         Behavior: Behavior normal.         Thought Content: Thought  content normal.         Judgment: Judgment normal.         Assessment/Plan   Diagnoses and all orders for this visit:    1. Memory loss (Primary)  -     Ambulatory Referral to Neurology  -suspect med effect as pt would be unusually young to have a true dementia. Taking valium less so don't think it's that, has already d/c'd topamax with no effect on memory    2. Pain of both elbows  -     Ambulatory Referral to Orthopedic Surgery  -Suspect neuropathy at the elbow    3. Chronic right shoulder pain  -     Ambulatory Referral to Orthopedic Surgery    4. Depression with anxiety  -     vilazodone (Viibryd) 40 MG tablet tablet; Take 2 tablets by mouth Daily for 30 days.  Dispense: 60 tablet; Refill: 11    5. Anxiety  -     vilazodone (Viibryd) 40 MG tablet tablet; Take 2 tablets by mouth Daily for 30 days.  Dispense: 60 tablet; Refill: 11             Argelia Mckeon MD    Please note that portions of this note were completed with a voice recognition program. Efforts were made to edit the dictations, but occasionally words are mistranscribed.

## 2021-07-30 ENCOUNTER — PRIOR AUTHORIZATION (OUTPATIENT)
Dept: INTERNAL MEDICINE | Facility: CLINIC | Age: 56
End: 2021-07-30

## 2021-09-14 DIAGNOSIS — F41.8 DEPRESSION WITH ANXIETY: ICD-10-CM

## 2021-09-14 RX ORDER — DIAZEPAM 5 MG/1
TABLET ORAL
Qty: 90 TABLET | Refills: 1 | Status: SHIPPED | OUTPATIENT
Start: 2021-09-14 | End: 2021-11-29

## 2021-09-20 ENCOUNTER — TELEPHONE (OUTPATIENT)
Dept: INTERNAL MEDICINE | Facility: CLINIC | Age: 56
End: 2021-09-20

## 2021-09-20 NOTE — TELEPHONE ENCOUNTER
Spoke with patient she says she is having vaginal pain and bleeding during intercourse . She is scheduled to come in on 9/24 to discuss.

## 2021-09-20 NOTE — TELEPHONE ENCOUNTER
Caller: Vish Russo    Relationship to patient: Self    Best call back number: [phone]    Chief complaint: WANTS A PAP SMEAR    Type of visit: IN-OFFICE PROCEDURE    Requested date: Wednesday, Thursday, Friday ONLY

## 2021-09-24 ENCOUNTER — TELEMEDICINE (OUTPATIENT)
Dept: INTERNAL MEDICINE | Facility: CLINIC | Age: 56
End: 2021-09-24

## 2021-09-24 DIAGNOSIS — Z00.00 HEALTH CARE MAINTENANCE: ICD-10-CM

## 2021-09-24 DIAGNOSIS — E04.0 GOITER DIFFUSE, NONTOXIC: ICD-10-CM

## 2021-09-24 DIAGNOSIS — N95.2 VAGINAL ATROPHY: Primary | ICD-10-CM

## 2021-09-24 DIAGNOSIS — F41.8 DEPRESSION WITH ANXIETY: ICD-10-CM

## 2021-09-24 PROCEDURE — 99214 OFFICE O/P EST MOD 30 MIN: CPT | Performed by: INTERNAL MEDICINE

## 2021-09-24 RX ORDER — ESTRADIOL 0.1 MG/G
CREAM VAGINAL
Qty: 42.5 G | Refills: 0 | Status: SHIPPED | OUTPATIENT
Start: 2021-09-24 | End: 2021-10-20 | Stop reason: SDUPTHER

## 2021-09-27 NOTE — PROGRESS NOTES
Subjective   Vish Russo is a 56 y.o. female here for follow-up via telehealth visit with interactive audio and video for pain during intercourse, anxiety and depression, fatigue and weight gain.  Patient says she has not been sexually active in a number of years but was recently.  She says it was so painful and she even had some bleeding afterwards.  She says she does have some discomfort and itchiness on a normal basis.  She did use lubrication during the encounter and even that did not help.  She desires to continue to be with this person so she would like to try something for that.  Mood has not been good, both anxious and depressed.  In the past she has been on Vraylar, Abilify, Cymbalta, Seroquel.  Currently on Viibryd, Wellbutrin, Valium.  Does not feel like depression is very well controlled.    I have reviewed the following portions of the patient's history and confirmed they are accurate: current medications, past medical history, past social history and problem list     I have personally reviewed and performed the ROS. Argelia Mckeon MD     Review of Systems:  General: Fatigue, weight gain  : See HPI  Neuro: negative  Psych: Anxiety and depression    Objective   There were no vitals taken for this visit.  No VS done due to televisit    Physical Exam  Vitals reviewed.   Constitutional:       Appearance: She is well-developed.   Pulmonary:      Effort: Pulmonary effort is normal.   Neurological:      Mental Status: She is alert and oriented to person, place, and time.   Psychiatric:         Behavior: Behavior normal.         Thought Content: Thought content normal.         Judgment: Judgment normal.         Assessment/Plan   Diagnoses and all orders for this visit:    1. Vaginal atrophy (Primary)  -     estradiol (ESTRACE VAGINAL) 0.1 MG/GM vaginal cream; Insert 2g PV daily x2 weeks then 1g PV 3x weekly x2 weeks then call MD for further instruction  Dispense: 42.5 g; Refill: 0    2.  Depression with anxiety  -     Ambulatory Referral to Behavioral Health    3. Goiter diffuse, nontoxic  -     TSH  -     T4, Free  -     T3, Free    4. Health care maintenance  -     CBC (No Diff)  -     Comprehensive Metabolic Panel  -     Lipid Panel    This visit has been rescheduled as a telehealth visit to comply with patient safety concerns in accordance with CDC recommendations.  Both parties are in the [facility].    You have chosen to receive care through a telehealth visit.  Do you consent to use a video/audio connection for your medical care today? Yes           Argelia Mckeon MD    Please note that portions of this note were completed with a voice recognition program. Efforts were made to edit the dictations, but occasionally words are mistranscribed.

## 2021-09-28 ENCOUNTER — LAB (OUTPATIENT)
Dept: LAB | Facility: HOSPITAL | Age: 56
End: 2021-09-28

## 2021-09-28 LAB
DEPRECATED RDW RBC AUTO: 43.2 FL (ref 37–54)
ERYTHROCYTE [DISTWIDTH] IN BLOOD BY AUTOMATED COUNT: 13.3 % (ref 12.3–15.4)
HCT VFR BLD AUTO: 42.4 % (ref 34–46.6)
HGB BLD-MCNC: 14.3 G/DL (ref 12–15.9)
MCH RBC QN AUTO: 30 PG (ref 26.6–33)
MCHC RBC AUTO-ENTMCNC: 33.7 G/DL (ref 31.5–35.7)
MCV RBC AUTO: 89.1 FL (ref 79–97)
PLATELET # BLD AUTO: 270 10*3/MM3 (ref 140–450)
PMV BLD AUTO: 11.4 FL (ref 6–12)
RBC # BLD AUTO: 4.76 10*6/MM3 (ref 3.77–5.28)
WBC # BLD AUTO: 6.85 10*3/MM3 (ref 3.4–10.8)

## 2021-09-28 PROCEDURE — 80061 LIPID PANEL: CPT | Performed by: INTERNAL MEDICINE

## 2021-09-28 PROCEDURE — 80050 GENERAL HEALTH PANEL: CPT | Performed by: INTERNAL MEDICINE

## 2021-09-28 PROCEDURE — 84481 FREE ASSAY (FT-3): CPT | Performed by: INTERNAL MEDICINE

## 2021-09-28 PROCEDURE — 84439 ASSAY OF FREE THYROXINE: CPT | Performed by: INTERNAL MEDICINE

## 2021-09-29 LAB
ALBUMIN SERPL-MCNC: 4.2 G/DL (ref 3.5–5.2)
ALBUMIN/GLOB SERPL: 2 G/DL
ALP SERPL-CCNC: 68 U/L (ref 39–117)
ALT SERPL W P-5'-P-CCNC: 17 U/L (ref 1–33)
ANION GAP SERPL CALCULATED.3IONS-SCNC: 8.9 MMOL/L (ref 5–15)
AST SERPL-CCNC: 21 U/L (ref 1–32)
BILIRUB SERPL-MCNC: 0.4 MG/DL (ref 0–1.2)
BUN SERPL-MCNC: 15 MG/DL (ref 6–20)
BUN/CREAT SERPL: 20 (ref 7–25)
CALCIUM SPEC-SCNC: 9.5 MG/DL (ref 8.6–10.5)
CHLORIDE SERPL-SCNC: 106 MMOL/L (ref 98–107)
CHOLEST SERPL-MCNC: 238 MG/DL (ref 0–200)
CO2 SERPL-SCNC: 25.1 MMOL/L (ref 22–29)
CREAT SERPL-MCNC: 0.75 MG/DL (ref 0.57–1)
GFR SERPL CREATININE-BSD FRML MDRD: 80 ML/MIN/1.73
GLOBULIN UR ELPH-MCNC: 2.1 GM/DL
GLUCOSE SERPL-MCNC: 102 MG/DL (ref 65–99)
HDLC SERPL-MCNC: 42 MG/DL (ref 40–60)
LDLC SERPL CALC-MCNC: 166 MG/DL (ref 0–100)
LDLC/HDLC SERPL: 3.88 {RATIO}
POTASSIUM SERPL-SCNC: 4.4 MMOL/L (ref 3.5–5.2)
PROT SERPL-MCNC: 6.3 G/DL (ref 6–8.5)
SODIUM SERPL-SCNC: 140 MMOL/L (ref 136–145)
T3FREE SERPL-MCNC: 2.87 PG/ML (ref 2–4.4)
T4 FREE SERPL-MCNC: 1.63 NG/DL (ref 0.93–1.7)
TRIGL SERPL-MCNC: 165 MG/DL (ref 0–150)
TSH SERPL DL<=0.05 MIU/L-ACNC: 0.46 UIU/ML (ref 0.27–4.2)
VLDLC SERPL-MCNC: 30 MG/DL (ref 5–40)

## 2021-10-13 ENCOUNTER — TELEPHONE (OUTPATIENT)
Dept: INTERNAL MEDICINE | Facility: CLINIC | Age: 56
End: 2021-10-13

## 2021-10-13 DIAGNOSIS — E04.0 GOITER DIFFUSE, NONTOXIC: ICD-10-CM

## 2021-10-13 RX ORDER — LEVOTHYROXINE SODIUM 75 MCG
TABLET ORAL
Qty: 30 TABLET | Refills: 5 | Status: SHIPPED | OUTPATIENT
Start: 2021-10-13 | End: 2022-06-06

## 2021-10-15 NOTE — TELEPHONE ENCOUNTER
PHONE CALL FROM PATIENT.  SYNTHROID NEEDS PRIOR AUTHORIZATION.  [facility] SAID TO [facility] IT URGENT AND THEY COULD HAVE IT WITHIN 24 HOURS.  PATIENT IS OUT OF MEDICATION.      PLEASE CALL [phone]

## 2021-10-19 ENCOUNTER — TELEPHONE (OUTPATIENT)
Dept: INTERNAL MEDICINE | Facility: CLINIC | Age: 56
End: 2021-10-19

## 2021-10-19 DIAGNOSIS — N95.2 VAGINAL ATROPHY: ICD-10-CM

## 2021-10-19 NOTE — TELEPHONE ENCOUNTER
Caller: Vish Russo    Relationship to patient: Self    Best call back number: [phone]    Patient is needing: PATIENT STATED THAT SHE WOULD BE OUT OF CREAM AND ALSO WAITING ON PA FOR MEDICATION AND WAS TRYING TO SCHEDULE AN APPOINTMENT BUT NEEDED TO TALK TO  OR NURSE ABOUT WHAT TO DO NEXT    PATIENT STATED THAT SHE DID NOT KNOW WHAT  WOULD WANT HER TO DO FIRST    PLEASE ADVISE

## 2021-10-20 RX ORDER — ESTRADIOL 0.1 MG/G
CREAM VAGINAL
Qty: 42.5 G | Refills: 0 | Status: SHIPPED | OUTPATIENT
Start: 2021-10-20

## 2021-10-21 ENCOUNTER — TELEMEDICINE (OUTPATIENT)
Dept: INTERNAL MEDICINE | Facility: CLINIC | Age: 56
End: 2021-10-21

## 2021-10-21 DIAGNOSIS — F41.8 DEPRESSION WITH ANXIETY: ICD-10-CM

## 2021-10-21 DIAGNOSIS — R63.8 UNABLE TO LOSE WEIGHT: Primary | ICD-10-CM

## 2021-10-21 PROCEDURE — 99213 OFFICE O/P EST LOW 20 MIN: CPT | Performed by: INTERNAL MEDICINE

## 2021-10-21 NOTE — PROGRESS NOTES
Subjective   Vish Russo is a 56 y.o. female here via telehealth appt with interactive audio and video for weight concerns and A&D. She says she continues to have problems losing weight. Hasn't gained, but can't weight even with very low calorie diets. She is interested in some more blood work. Mood is stable but still uncontrolled. On wellbutrin and viibryd. Has behavioral health appt soon and looking forward to that.      I have reviewed the following portions of the patient's history and confirmed they are accurate: current medications, past medical history, past social history and problem list     I have personally reviewed and performed the ROS. Argelia Mckeon MD     Review of Systems:  General: can't lose weight  Endo: negative  Psych: anxiety and depression    Objective   There were no vitals taken for this visit.  No VS done due to video visit  190lb (taken by pt)    Physical Exam  Vitals reviewed.   Constitutional:       Appearance: She is well-developed.   Pulmonary:      Effort: Pulmonary effort is normal.   Neurological:      Mental Status: She is alert and oriented to person, place, and time.   Psychiatric:         Behavior: Behavior normal.         Thought Content: Thought content normal.         Judgment: Judgment normal.         Assessment/Plan   Diagnoses and all orders for this visit:    1. Unable to lose weight (Primary)  -     Testosterone (Free & Total), LC / MS  -     Cortisol, Free  -wellbutrin hasn't helped but continue for mood    2. Depression with anxiety  -continue wellbutrin, also has pending behavioral health appt        This visit has been rescheduled as a phone visit to comply with patient safety concerns in accordance with CDC recommendations.  Both parties present in the [facility].    You have chosen to receive care through a telehealth visit.  Do you consent to use a video/audio connection for your medical care today? Yes      Argelia Mckeon  MD    Please note that portions of this note were completed with a voice recognition program. Efforts were made to edit the dictations, but occasionally words are mistranscribed.

## 2021-10-28 ENCOUNTER — TELEMEDICINE (OUTPATIENT)
Dept: PSYCHIATRY | Facility: CLINIC | Age: 56
End: 2021-10-28

## 2021-10-28 DIAGNOSIS — F33.2 SEVERE EPISODE OF RECURRENT MAJOR DEPRESSIVE DISORDER, WITHOUT PSYCHOTIC FEATURES (HCC): Primary | Chronic | ICD-10-CM

## 2021-10-28 DIAGNOSIS — F43.10 POST TRAUMATIC STRESS DISORDER (PTSD): Chronic | ICD-10-CM

## 2021-10-28 PROCEDURE — 90792 PSYCH DIAG EVAL W/MED SRVCS: CPT | Performed by: NURSE PRACTITIONER

## 2021-10-28 NOTE — TREATMENT PLAN
Multi-Disciplinary Problems (from Behavioral Health Treatment Plan)    Active Problems     Problem: Depression  Start Date: 10/28/21    Problem Details: The patient self-scales this problem as a 8 with 10 being the worst.        Goal Priority Start Date Expected End Date End Date    Patient will demonstrate the ability to initiate new constructive life skills outside of sessions on a consistent basis. -- 10/28/21 -- --    Goal Details: Progress toward goal:  Not appropriate to rate progress toward goal since this is the initial treatment plan.        Goal Intervention Frequency Start Date End Date    Assist patient in setting attainable activities of daily living goals. PRN 10/28/21 --    Goal Intervention Frequency Start Date End Date    Provide education about depression Q Month 10/28/21 --    Intervention Details: Duration of treatment until until remission of symptoms.        Goal Intervention Frequency Start Date End Date    Assist patient in developing healthy coping strategies. Q Month 10/28/21 --    Intervention Details: Duration of treatment until until remission of symptoms.              Problem: Post Traumatic Stress  Start Date: 10/28/21    Problem Details: The patient self-scales this problem as a 8 with 10 being the worst.        Goal Priority Start Date Expected End Date End Date    Patient will process and move through trauma in a way that improves self regard and the patients ability to function optimally in the world around them. -- 10/28/21 -- --    Goal Details: Progress toward goal:  Not appropriate to rate progress toward goal since this is the initial treatment plan.        Goal Intervention Frequency Start Date End Date    Assist patient in identifying ways that trauma has negatively impacted their view of themselves and the world. Q Month 10/28/21 --    Intervention Details: Duration of treatment until until remission of symptoms.        Goal Intervention Frequency Start Date End Date     Process trauma in the context of the safe session environment. Q Month 10/28/21 --    Intervention Details: Duration of treatment until until remission of symptoms.        Goal Intervention Frequency Start Date End Date    Develop a plan of behavior changes that will reduce the stress of the trauma. Q Month 10/28/21 --    Intervention Details: Duration of treatment until until remission of symptoms.                         I have discussed and reviewed this treatment plan with the patient and/or guardian.  The patient has verbally agreed with this treatment plan (no signatures are obtained at today's visit as the patient is a telehealth patient and is unable to print and sign this document, therefore verbal agreement is obtained).  .
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
## 2021-11-24 ENCOUNTER — TELEMEDICINE (OUTPATIENT)
Dept: PSYCHIATRY | Facility: CLINIC | Age: 56
End: 2021-11-24

## 2021-11-24 DIAGNOSIS — F43.10 POST TRAUMATIC STRESS DISORDER (PTSD): Chronic | ICD-10-CM

## 2021-11-24 DIAGNOSIS — F33.2 SEVERE EPISODE OF RECURRENT MAJOR DEPRESSIVE DISORDER, WITHOUT PSYCHOTIC FEATURES (HCC): Primary | Chronic | ICD-10-CM

## 2021-11-24 PROCEDURE — 99214 OFFICE O/P EST MOD 30 MIN: CPT | Performed by: NURSE PRACTITIONER

## 2021-11-24 RX ORDER — SERTRALINE HYDROCHLORIDE 100 MG/1
TABLET, FILM COATED ORAL
Qty: 30 TABLET | Refills: 0 | Status: SHIPPED | OUTPATIENT
Start: 2021-11-24 | End: 2021-12-22 | Stop reason: DRUGHIGH
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
## 2021-11-27 DIAGNOSIS — F41.8 DEPRESSION WITH ANXIETY: ICD-10-CM

## 2021-11-29 RX ORDER — DIAZEPAM 5 MG/1
TABLET ORAL
Qty: 90 TABLET | Refills: 2 | Status: SHIPPED | OUTPATIENT
Start: 2021-11-29 | End: 2022-05-27 | Stop reason: SDUPTHER

## 2021-12-22 ENCOUNTER — TELEMEDICINE (OUTPATIENT)
Dept: PSYCHIATRY | Facility: CLINIC | Age: 56
End: 2021-12-22

## 2021-12-22 DIAGNOSIS — F43.12 NIGHTMARES ASSOCIATED WITH CHRONIC POST-TRAUMATIC STRESS DISORDER: Chronic | ICD-10-CM

## 2021-12-22 DIAGNOSIS — F51.5 NIGHTMARES ASSOCIATED WITH CHRONIC POST-TRAUMATIC STRESS DISORDER: Chronic | ICD-10-CM

## 2021-12-22 DIAGNOSIS — F43.10 POST TRAUMATIC STRESS DISORDER (PTSD): Primary | Chronic | ICD-10-CM

## 2021-12-22 DIAGNOSIS — F33.2 SEVERE EPISODE OF RECURRENT MAJOR DEPRESSIVE DISORDER, WITHOUT PSYCHOTIC FEATURES (HCC): Chronic | ICD-10-CM

## 2021-12-22 PROCEDURE — 99214 OFFICE O/P EST MOD 30 MIN: CPT | Performed by: NURSE PRACTITIONER

## 2021-12-22 RX ORDER — SERTRALINE HYDROCHLORIDE 100 MG/1
TABLET, FILM COATED ORAL
Qty: 45 TABLET | Refills: 0 | Status: SHIPPED | OUTPATIENT
Start: 2021-12-22 | End: 2022-01-19 | Stop reason: SDUPTHER

## 2021-12-22 RX ORDER — TRAZODONE HYDROCHLORIDE 100 MG/1
150 TABLET ORAL NIGHTLY
Qty: 135 TABLET | Refills: 0 | Status: SHIPPED | OUTPATIENT
Start: 2021-12-22 | End: 2022-05-18 | Stop reason: SDUPTHER

## 2021-12-22 RX ORDER — PRAZOSIN HYDROCHLORIDE 1 MG/1
1 CAPSULE ORAL NIGHTLY
Qty: 30 CAPSULE | Refills: 0 | Status: SHIPPED | OUTPATIENT
Start: 2021-12-22 | End: 2022-01-19 | Stop reason: SDUPTHER

## 2021-12-22 NOTE — PROGRESS NOTES
"This provider is located at the [facility] (through [facility]), [address], [place], [address] using a secure Learn [employer] Video Visit through [email or web address]. Patient is being seen remotely via telehealth at their home address in [place], and stated they are in a secure environment for this session. The patient's condition being diagnosed/treated is appropriate for telemedicine. The provider identified herself as well as her credentials.   The patient, and/or patients guardian, consent to be seen remotely, and when consent is given they understand that the consent allows for patient identifiable information to be sent to a third party as needed.   They may refuse to be seen remotely at any time. The electronic data is encrypted and password protected, and the patient and/or guardian has been advised of the potential risks to privacy not withstanding such measures.    You have chosen to receive care through a telehealth visit.  Do you consent to use a video/audio connection for your medical care today? Yes        Subjective   Vish Russo is a 56 y.o. female who presents today for follow up    Chief Complaint:  Depression, anxiety, and sleep disturbance    Accompanied by: Pt was alone for duration of appointment    History of Present Illness:   Pt states she is \"hanging in there\". Pt reports a lot of what she is going through right now is situational. Her mother overmedicated herself again and had to be hospitalized. Pt had to remove all the OTC medication from her mother's cabinets and found alcohol in her closet. Pt is struggling financially and worries about it. Yesterday was the anniversary of her father's death. He  in the [place]. Pt spent the day at work and was tearful there. It took her everything to get through the day. Pt went to bed when she got home and didn't fall asleep until 2-3 AM. Pt took a Valium and Trazodone to sleep. Pt has been having a lot of " nightmares, frequency fluctuates. 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 Pt is going to her mother's for [place]; most of her family will be there. Pt's mother and sister only invite pt places on days she knows she works. Appetite fluctuates. The patient denies any new medical problems or changes in medications since last appointment with this facility. The patient reports compliance with current medication regimen. The patient denies any current side effects from her current medication regimen. The patient denies any abnormal muscle movements or tics. The patient rates their depression at a 10/10 on a 0-10 scale, with 10 being the worst. The patient rates their anxiety at a 10/10 on a 0-10 scale, with 10 being the worst. The patient would like to adjust their medications at this visit. The patient denies any suicidal or homicidal ideations, plans, or intent at today's encounter and is convincing.  The patient denies any auditory hallucinations or visual hallucinations. The patient does not endorse any significant symptoms consistent with royal or psychosis at today's encounter.          Prior Psychiatric Medications:  Viibryd  Wellbutrin XL  Valium  Trazodone  Vraylar  Abilify  Cymbalta  Seroquel  Zyprexa - swelling  Propranolol - swelling  Prozac - no feeling on it   Buspar - ineffective          The following portions of the patient's history were reviewed and updated as appropriate: allergies, current medications, past family history, past medical history, past social history, past surgical history and problem list.          Past Medical History:  Past Medical History:   Diagnosis Date   • Kidney infection    • Peptic ulceration        Social History:  Social History     Socioeconomic History   • Marital status:    Tobacco Use   • Smoking status: Current Every Day Smoker     Packs/day: 0.25     Types: Cigarettes   • Smokeless tobacco: Never Used   Substance and Sexual  Activity   • Alcohol use: Yes     Comment: Occasional use   • Drug use: No   • Sexual activity: Yes       Family History:  Family History   Problem Relation Age of Onset   • Arthritis Mother    • Depression Mother    • Hyperlipidemia Other    • Hypertension Other    • Liver disease Other    • Other Other         malignant neoplasm   • Heart attack Other    • Obesity Other    • Osteoporosis Other    • Anxiety disorder Sister    • Depression Sister    • Drug abuse Sister    • Breast cancer Neg Hx    • Ovarian cancer Neg Hx        Past Surgical History:  Past Surgical History:   Procedure Laterality Date   • HYSTERECTOMY  04/12/1996    Partial hysterectomy       Problem List:  Patient Active Problem List   Diagnosis   • Depression with anxiety   • Arthritis   • Bursitis   • Pure hypercholesterolemia   • Cyst of left ovary   • Perennial allergic rhinitis   • Goiter diffuse, nontoxic   • Quit smoking within past year   • Migraine without status migrainosus, not intractable       Allergy:   Allergies   Allergen Reactions   • Imitrex [Sumatriptan] Confusion     Also caused chest pain   • Propranolol Swelling   • Zyprexa [Olanzapine] Swelling        Current Medications:   Current Outpatient Medications   Medication Sig Dispense Refill   • baclofen (LIORESAL) 20 MG tablet TAKE ONE TABLET BY MOUTH TWICE A DAY (Patient taking differently: PRN) 60 tablet 5   • buPROPion XL (WELLBUTRIN XL) 300 MG 24 hr tablet TAKE ONE TABLET BY MOUTH DAILY 90 tablet 3   • diazePAM (VALIUM) 5 MG tablet TAKE ONE TABLET BY MOUTH EVERY 8 HOURS AS NEEDED FOR ANXIETY/SLEEP 90 tablet 2   • estradiol (ESTRACE VAGINAL) 0.1 MG/GM vaginal cream 1g PV 3x weekly x2 weeks 42.5 g 0   • fluticasone (FLONASE) 50 MCG/ACT nasal spray SPRAY TWO SPRAYS IN EACH NOSTRIL DAILY 15.8 mL 5   • prazosin (MINIPRESS) 1 MG capsule Take 1 capsule by mouth Every Night. 30 capsule 0   • sertraline (Zoloft) 100 MG tablet Take 1.5 tablets PO Daily 45 tablet 0   • Synthroid 75 MCG  tablet TAKE ONE TABLET BY MOUTH EVERY MORNING WHILE FASTING. WAIT ONE HOUR BEFORE EATING OR TAKING OTHER MEDICATION. 30 tablet 5   • traZODone (DESYREL) 100 MG tablet Take 1.5 tablets by mouth Every Night. 135 tablet 0     No current facility-administered medications for this visit.       Review of Symptoms:    Review of Systems   Constitutional: Positive for activity change, appetite change and fatigue.   Psychiatric/Behavioral: Positive for decreased concentration, sleep disturbance, depressed mood and stress. The patient is nervous/anxious.          Physical Exam:   Due to the remote nature of this encounter (virtual encounter), vitals were unable to be obtained.  Height stated at 65 inches.  Weight stated at 182 pounds.      Physical Exam  Neurological:      Mental Status: She is alert.   Psychiatric:         Attention and Perception: Attention and perception normal. She does not perceive auditory or visual hallucinations.         Mood and Affect: Mood is anxious and depressed. Affect is tearful.         Speech: Speech normal.         Behavior: Behavior normal. Behavior is cooperative.         Thought Content: Thought content normal. Thought content is not paranoid or delusional. Thought content does not include homicidal or suicidal ideation. Thought content does not include homicidal or suicidal plan.         Cognition and Memory: Cognition normal.           Mental Status Exam:   Hygiene:   good  Cooperation:  Cooperative  Eye Contact:  Good  Psychomotor Behavior:  Appropriate  Affect:  Appropriate  Mood: depressed and anxious  Speech:  Normal  Thought Process:  Goal directed  Thought Content:  Mood congruent  Suicidal:  None  Homicidal:  None  Hallucinations:  None  Delusion:  None  Memory:  Intact  Orientation:  Person, Place, Time and Situation  Reliability:  good  Insight:  Fair  Judgement:  Fair  Impulse Control:  Fair  Physical/Medical Issues:  No        PHQ-9 Depression Screening  Little interest or  pleasure in doing things? 3   Feeling down, depressed, or hopeless? 3   Trouble falling or staying asleep, or sleeping too much? 3   Feeling tired or having little energy? 3   Poor appetite or overeating? 3   Feeling bad about yourself - or that you are a failure or have let yourself or your family down? 3   Trouble concentrating on things, such as reading the newspaper or watching television? 3   Moving or speaking so slowly that other people could have noticed? Or the opposite - being so fidgety or restless that you have been moving around a lot more than usual? 2   Thoughts that you would be better off dead, or of hurting yourself in some way? 0   PHQ-9 Total Score 23   If you checked off any problems, how difficult have these problems made it for you to do your work, take care of things at home, or get along with other people? Extremely dIfficult     PHQ-9 Total Score: 23        CATIE 7 anxiety screening tool that patient filled out virtually reviewed by this APRN at today's encounter.    PROMIS scale screening tool that patient filled out virtually reviewed by this APRN at today's encounter.    Previous Provider notes and available records reviewed by this APRN at today's encounter.         Lab Results:   No visits with results within 1 Month(s) from this visit.   Latest known visit with results is:   Telemedicine on 09/24/2021   Component Date Value Ref Range Status   • TSH 09/28/2021 0.457  0.270 - 4.200 uIU/mL Final   • Free T4 09/28/2021 1.63  0.93 - 1.70 ng/dL Final   • T3, Free 09/28/2021 2.87  2.00 - 4.40 pg/mL Final   • WBC 09/28/2021 6.85  3.40 - 10.80 10*3/mm3 Final   • RBC 09/28/2021 4.76  3.77 - 5.28 10*6/mm3 Final   • Hemoglobin 09/28/2021 14.3  12.0 - 15.9 g/dL Final   • Hematocrit 09/28/2021 42.4  34.0 - 46.6 % Final   • MCV 09/28/2021 89.1  79.0 - 97.0 fL Final   • MCH 09/28/2021 30.0  26.6 - 33.0 pg Final   • MCHC 09/28/2021 33.7  31.5 - 35.7 g/dL Final   • RDW 09/28/2021 13.3  12.3 - 15.4 %  Final   • RDW-SD 09/28/2021 43.2  37.0 - 54.0 fl Final   • MPV 09/28/2021 11.4  6.0 - 12.0 fL Final   • Platelets 09/28/2021 270  140 - 450 10*3/mm3 Final   • Glucose 09/28/2021 102* 65 - 99 mg/dL Final   • BUN 09/28/2021 15  6 - 20 mg/dL Final   • Creatinine 09/28/2021 0.75  0.57 - 1.00 mg/dL Final   • Sodium 09/28/2021 140  136 - 145 mmol/L Final   • Potassium 09/28/2021 4.4  3.5 - 5.2 mmol/L Final   • Chloride 09/28/2021 106  98 - 107 mmol/L Final   • CO2 09/28/2021 25.1  22.0 - 29.0 mmol/L Final   • Calcium 09/28/2021 9.5  8.6 - 10.5 mg/dL Final   • Total Protein 09/28/2021 6.3  6.0 - 8.5 g/dL Final   • Albumin 09/28/2021 4.20  3.50 - 5.20 g/dL Final   • ALT (SGPT) 09/28/2021 17  1 - 33 U/L Final   • AST (SGOT) 09/28/2021 21  1 - 32 U/L Final   • Alkaline Phosphatase 09/28/2021 68  39 - 117 U/L Final   • Total Bilirubin 09/28/2021 0.4  0.0 - 1.2 mg/dL Final   • eGFR Non  Amer 09/28/2021 80  >60 mL/min/1.73 Final   • Globulin 09/28/2021 2.1  gm/dL Final   • A/G Ratio 09/28/2021 2.0  g/dL Final   • BUN/Creatinine Ratio 09/28/2021 20.0  7.0 - 25.0 Final   • Anion Gap 09/28/2021 8.9  5.0 - 15.0 mmol/L Final   • Total Cholesterol 09/28/2021 238* 0 - 200 mg/dL Final   • Triglycerides 09/28/2021 165* 0 - 150 mg/dL Final   • HDL Cholesterol 09/28/2021 42  40 - 60 mg/dL Final   • LDL Cholesterol  09/28/2021 166* 0 - 100 mg/dL Final   • VLDL Cholesterol 09/28/2021 30  5 - 40 mg/dL Final   • LDL/HDL Ratio 09/28/2021 3.88   Final         Assessment/Plan   Problems Addressed this Visit     None      Visit Diagnoses     Post traumatic stress disorder (PTSD)  (Chronic)   -  Primary    Relevant Medications    traZODone (DESYREL) 100 MG tablet    sertraline (Zoloft) 100 MG tablet    Severe episode of recurrent major depressive disorder, without psychotic features (HCC)  (Chronic)       Relevant Medications    traZODone (DESYREL) 100 MG tablet    sertraline (Zoloft) 100 MG tablet    Nightmares associated with chronic  post-traumatic stress disorder  (Chronic)       Relevant Medications    traZODone (DESYREL) 100 MG tablet    sertraline (Zoloft) 100 MG tablet    prazosin (MINIPRESS) 1 MG capsule      Diagnoses       Codes Comments    Post traumatic stress disorder (PTSD)    -  Primary ICD-10-CM: F43.10  ICD-9-CM: 309.81     Severe episode of recurrent major depressive disorder, without psychotic features (HCC)     ICD-10-CM: F33.2  ICD-9-CM: 296.33     Nightmares associated with chronic post-traumatic stress disorder     ICD-10-CM: F51.5, F43.12  ICD-9-CM: 307.47, 309.81           Visit Diagnoses:    ICD-10-CM ICD-9-CM   1. Post traumatic stress disorder (PTSD)  F43.10 309.81   2. Severe episode of recurrent major depressive disorder, without psychotic features (HCC)  F33.2 296.33   3. Nightmares associated with chronic post-traumatic stress disorder  F51.5 307.47    F43.12 309.81          GOALS:  Short Term Goals: Patient will be compliant with medication, and patient will have no significant medication related side effects.  Patient will be engaged in psychotherapy as indicated.  Patient will report subjective improvement of symptoms.  Long term goals: To stabilize mood and treat/improve subjective symptoms, the patient will stay out of the hospital, the patient will be at an optimal level of functioning, and the patient will take all medications as prescribed.  The patient verbalized understanding and agreement with goals that were mutually set.      TREATMENT PLAN:   Continue supportive psychotherapy efforts and medications as indicated.   -Increase Zoloft to 150 mg PO Daily for depression and anxiety.   -Continue Trazodone 150 mg PO QHS PRN for sleep   -Continue Wellbutrin  mg PO QAM for depression from PCP. This APRN will take over prescription when a refill is needed  -Start prazosin 1 mg PO QHS for nightmares  -Pt is prescribed Valium 5 mg PO every 8 hours PRN for anxiety/sleep from PCP. This APRN explained to pt  that she does not prescribe long-term benzodiazepines due to the increased risk of addiction and dementia. Explained to pt that benzodiazepines are a high risk medication and were meant to be a temporary medication solution. First line treatment includes antidepressants and therapy. This APRN will try to find pt an alternative medication to treat her symptoms. Pt states that she will continue getting a prescription from her PCP in the meantime.    Medication and treatment options, both pharmacological and non-pharmacological treatment options, discussed during today's visit, including any off label use of medication. Patient acknowledged and verbally consented with current treatment plan and was educated on the importance of compliance with treatment and follow-up appointments.        MEDICATION ISSUES:    Discussed treatment plan and medication options of prescribed medication as well as the risks, benefits, any black box warnings, and side effects including potential falls, possible impaired driving, and metabolic adversities among others, including any off label use of medication. Patient is agreeable to call the office with any worsening of symptoms or onset of side effects, or if any concerns or questions arise.  The contact information for the office is made available to the patient. Patient is agreeable to call 911 or go to the nearest ER should they begin having any SI/HI, or if any urgent concerns arise. No medication side effects or related complaints today.       MEDS ORDERED DURING VISIT:  New Medications Ordered This Visit   Medications   • traZODone (DESYREL) 100 MG tablet     Sig: Take 1.5 tablets by mouth Every Night.     Dispense:  135 tablet     Refill:  0   • sertraline (Zoloft) 100 MG tablet     Sig: Take 1.5 tablets PO Daily     Dispense:  45 tablet     Refill:  0   • prazosin (MINIPRESS) 1 MG capsule     Sig: Take 1 capsule by mouth Every Night.     Dispense:  30 capsule     Refill:  0        Return in about 4 weeks (around 1/19/2022), or if symptoms worsen or fail to improve, for Recheck.     Treatment plan completed: 10/28/21    Progress toward goal: Not at goal    Functional Status: Moderate impairment     Prognosis: Fair with Ongoing Treatment         This document has been electronically signed by CHIP Edward  December 22, 2021 09:34 EST     Some of the data in this electronic note has been brought forward from a previous encounter, any necessary changes have been made, it has been reviewed by this APRN, and it is accurate.    Please note that portions of this note were completed with a voice recognition program. Efforts were made to edit dictation, but occasionally words are mistranscribed.

## 2022-01-12 ENCOUNTER — TELEMEDICINE (OUTPATIENT)
Dept: PSYCHIATRY | Facility: CLINIC | Age: 57
End: 2022-01-12

## 2022-01-12 DIAGNOSIS — F33.1 MODERATE EPISODE OF RECURRENT MAJOR DEPRESSIVE DISORDER: Primary | ICD-10-CM

## 2022-01-12 PROCEDURE — 90791 PSYCH DIAGNOSTIC EVALUATION: CPT | Performed by: COUNSELOR
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
## 2022-01-19 ENCOUNTER — TELEMEDICINE (OUTPATIENT)
Dept: PSYCHIATRY | Facility: CLINIC | Age: 57
End: 2022-01-19

## 2022-01-19 ENCOUNTER — PRIOR AUTHORIZATION (OUTPATIENT)
Dept: PSYCHIATRY | Facility: CLINIC | Age: 57
End: 2022-01-19

## 2022-01-19 DIAGNOSIS — F33.2 SEVERE EPISODE OF RECURRENT MAJOR DEPRESSIVE DISORDER, WITHOUT PSYCHOTIC FEATURES: Chronic | ICD-10-CM

## 2022-01-19 DIAGNOSIS — F43.10 POST TRAUMATIC STRESS DISORDER (PTSD): Primary | Chronic | ICD-10-CM

## 2022-01-19 DIAGNOSIS — F51.5 NIGHTMARES ASSOCIATED WITH CHRONIC POST-TRAUMATIC STRESS DISORDER: Chronic | ICD-10-CM

## 2022-01-19 DIAGNOSIS — F43.12 NIGHTMARES ASSOCIATED WITH CHRONIC POST-TRAUMATIC STRESS DISORDER: Chronic | ICD-10-CM

## 2022-01-19 PROCEDURE — 99214 OFFICE O/P EST MOD 30 MIN: CPT | Performed by: NURSE PRACTITIONER

## 2022-01-19 RX ORDER — SERTRALINE HYDROCHLORIDE 100 MG/1
TABLET, FILM COATED ORAL
Qty: 45 TABLET | Refills: 0 | Status: SHIPPED | OUTPATIENT
Start: 2022-01-19 | End: 2022-02-17 | Stop reason: SDUPTHER

## 2022-01-19 RX ORDER — PRAZOSIN HYDROCHLORIDE 1 MG/1
1 CAPSULE ORAL NIGHTLY
Qty: 30 CAPSULE | Refills: 0 | Status: SHIPPED | OUTPATIENT
Start: 2022-01-19 | End: 2022-02-17 | Stop reason: DRUGHIGH

## 2022-01-19 RX ORDER — BREXPIPRAZOLE 0.5 MG/1
0.5 TABLET ORAL DAILY
Qty: 30 TABLET | Refills: 0 | Status: SHIPPED | OUTPATIENT
Start: 2022-01-19 | End: 2022-02-17 | Stop reason: DRUGHIGH

## 2022-01-19 NOTE — PROGRESS NOTES
This provider is located at the [facility] (through [facility]), [address], [place], [address] using a secure Jimmy [employer]ly[employer] Video Visit through [employer]. Patient is being seen remotely via telehealth at their home address in [place], and stated they are in a secure environment for this session. The patient's condition being diagnosed/treated is appropriate for telemedicine. The provider identified herself as well as her credentials.   The patient, and/or patients guardian, consent to be seen remotely, and when consent is given they understand that the consent allows for patient identifiable information to be sent to a third party as needed.   They may refuse to be seen remotely at any time. The electronic data is encrypted and password protected, and the patient and/or guardian has been advised of the potential risks to privacy not withstanding such measures.    You have chosen to receive care through a telehealth visit.  Do you consent to use a video/audio connection for your medical care today? Yes        Subjective   Vish Russo is a 56 y.o. female who presents today for follow up    Chief Complaint:  Depression, anxiety, and sleep disturbance    Accompanied by: Pt was alone for duration of appointment    History of Present Illness:   Pt states she enjoyed [place]; she was able to spend time with her grandchildren. There hasn't been any additional family stress lately, but she also hasn't talked to her mother much. Pt's sister made plans with her and never followed through. Pt states she has lowered her expectations. Pt hasn't been feeling well this passed week due to a cough. She tested negative for COVID-19. Pt has a viral infection in her eye and had to see the doctor today; she received an injection in her eye. She is also on medication for the next 7-10 days. Her eye hurts to touch. Pt has been sleeping a little better. Pt still awakes during the night. Pt  "hasn't had any dreams or nightmares on the prazosin. Mood isn't any better and not any worse. She still feels as if she is \"teetering on that fence of depression and anxiety.\" The patient denies any new medical problems or changes in medications since last appointment with this facility. The patient reports compliance with current medication regimen. The patient denies any current side effects from her current medication regimen. The patient denies any abnormal muscle movements or tics. The patient rates their depression at an 8/10 on a 0-10 scale, with 10 being the worst. The patient rates their anxiety at an 8/10 on a 0-10 scale, with 10 being the worst. The patient would like to adjust their medications at this visit. The patient denies any suicidal or homicidal ideations, plans, or intent at today's encounter and is convincing. The patient denies any auditory hallucinations or visual hallucinations. The patient does not endorse any significant symptoms consistent with royal or psychosis at today's encounter.            Prior Psychiatric Medications:  Viibryd  Wellbutrin XL  Valium  Trazodone  Vraylar  Abilify  Cymbalta  Seroquel  Zyprexa - swelling  Propranolol - swelling  Prozac - no feeling on it   Buspar - ineffective          The following portions of the patient's history were reviewed and updated as appropriate: allergies, current medications, past family history, past medical history, past social history, past surgical history and problem list.          Past Medical History:  Past Medical History:   Diagnosis Date   • Kidney infection    • Peptic ulceration        Social History:  Social History     Socioeconomic History   • Marital status:    Tobacco Use   • Smoking status: Current Every Day Smoker     Packs/day: 0.25     Types: Cigarettes   • Smokeless tobacco: Never Used   Substance and Sexual Activity   • Alcohol use: Yes     Comment: Occasional use   • Drug use: No   • Sexual activity: Yes "       Family History:  Family History   Problem Relation Age of Onset   • Arthritis Mother    • Depression Mother    • Hyperlipidemia Other    • Hypertension Other    • Liver disease Other    • Other Other         malignant neoplasm   • Heart attack Other    • Obesity Other    • Osteoporosis Other    • Anxiety disorder Sister    • Depression Sister    • Drug abuse Sister    • Breast cancer Neg Hx    • Ovarian cancer Neg Hx        Past Surgical History:  Past Surgical History:   Procedure Laterality Date   • HYSTERECTOMY  04/12/1996    Partial hysterectomy       Problem List:  Patient Active Problem List   Diagnosis   • Depression with anxiety   • Arthritis   • Bursitis   • Pure hypercholesterolemia   • Cyst of left ovary   • Perennial allergic rhinitis   • Goiter diffuse, nontoxic   • Quit smoking within past year   • Migraine without status migrainosus, not intractable       Allergy:   Allergies   Allergen Reactions   • Imitrex [Sumatriptan] Confusion     Also caused chest pain   • Propranolol Swelling   • Zyprexa [Olanzapine] Swelling        Current Medications:   Current Outpatient Medications   Medication Sig Dispense Refill   • baclofen (LIORESAL) 20 MG tablet TAKE ONE TABLET BY MOUTH TWICE A DAY (Patient taking differently: PRN) 60 tablet 5   • Brexpiprazole (Rexulti) 0.5 MG tablet Take 0.5 mg by mouth Daily. 30 tablet 0   • buPROPion XL (WELLBUTRIN XL) 300 MG 24 hr tablet TAKE ONE TABLET BY MOUTH DAILY 90 tablet 3   • diazePAM (VALIUM) 5 MG tablet TAKE ONE TABLET BY MOUTH EVERY 8 HOURS AS NEEDED FOR ANXIETY/SLEEP 90 tablet 2   • estradiol (ESTRACE VAGINAL) 0.1 MG/GM vaginal cream 1g PV 3x weekly x2 weeks 42.5 g 0   • fluticasone (FLONASE) 50 MCG/ACT nasal spray SPRAY TWO SPRAYS IN EACH NOSTRIL DAILY 15.8 mL 5   • prazosin (MINIPRESS) 1 MG capsule Take 1 capsule by mouth Every Night. 30 capsule 0   • sertraline (Zoloft) 100 MG tablet Take 1.5 tablets PO Daily 45 tablet 0   • Synthroid 75 MCG tablet TAKE ONE  TABLET BY MOUTH EVERY MORNING WHILE FASTING. WAIT ONE HOUR BEFORE EATING OR TAKING OTHER MEDICATION. 30 tablet 5   • traZODone (DESYREL) 100 MG tablet Take 1.5 tablets by mouth Every Night. 135 tablet 0     No current facility-administered medications for this visit.       Review of Symptoms:    Review of Systems   Constitutional: Positive for activity change, appetite change and fatigue.   Psychiatric/Behavioral: Positive for decreased concentration, sleep disturbance, depressed mood and stress. The patient is nervous/anxious.          Physical Exam:   Due to the remote nature of this encounter (virtual encounter), vitals were unable to be obtained.  Height stated at 65 inches.  Weight stated at 190 pounds.      Physical Exam  Neurological:      Mental Status: She is alert.   Psychiatric:         Attention and Perception: Attention and perception normal. She does not perceive auditory or visual hallucinations.         Mood and Affect: Mood is anxious and depressed.         Speech: Speech normal.         Behavior: Behavior normal. Behavior is cooperative.         Thought Content: Thought content normal. Thought content is not paranoid or delusional. Thought content does not include homicidal or suicidal ideation. Thought content does not include homicidal or suicidal plan.         Cognition and Memory: Cognition normal.           Mental Status Exam:   Hygiene:   good  Cooperation:  Cooperative  Eye Contact:  Good  Psychomotor Behavior:  Appropriate  Affect:  Appropriate  Mood: depressed and anxious  Speech:  Normal  Thought Process:  Linear  Thought Content:  Mood congruent  Suicidal:  None  Homicidal:  None  Hallucinations:  None  Delusion:  None  Memory:  Intact  Orientation:  Person, Place, Time and Situation  Reliability:  good  Insight:  Fair  Judgement:  Fair  Impulse Control:  Fair  Physical/Medical Issues:  No          Previous Provider notes and available records reviewed by this APRN at today's encounter.          Lab Results:   No visits with results within 1 Month(s) from this visit.   Latest known visit with results is:   Telemedicine on 09/24/2021   Component Date Value Ref Range Status   • TSH 09/28/2021 0.457  0.270 - 4.200 uIU/mL Final   • Free T4 09/28/2021 1.63  0.93 - 1.70 ng/dL Final   • T3, Free 09/28/2021 2.87  2.00 - 4.40 pg/mL Final   • WBC 09/28/2021 6.85  3.40 - 10.80 10*3/mm3 Final   • RBC 09/28/2021 4.76  3.77 - 5.28 10*6/mm3 Final   • Hemoglobin 09/28/2021 14.3  12.0 - 15.9 g/dL Final   • Hematocrit 09/28/2021 42.4  34.0 - 46.6 % Final   • MCV 09/28/2021 89.1  79.0 - 97.0 fL Final   • MCH 09/28/2021 30.0  26.6 - 33.0 pg Final   • MCHC 09/28/2021 33.7  31.5 - 35.7 g/dL Final   • RDW 09/28/2021 13.3  12.3 - 15.4 % Final   • RDW-SD 09/28/2021 43.2  37.0 - 54.0 fl Final   • MPV 09/28/2021 11.4  6.0 - 12.0 fL Final   • Platelets 09/28/2021 270  140 - 450 10*3/mm3 Final   • Glucose 09/28/2021 102* 65 - 99 mg/dL Final   • BUN 09/28/2021 15  6 - 20 mg/dL Final   • Creatinine 09/28/2021 0.75  0.57 - 1.00 mg/dL Final   • Sodium 09/28/2021 140  136 - 145 mmol/L Final   • Potassium 09/28/2021 4.4  3.5 - 5.2 mmol/L Final   • Chloride 09/28/2021 106  98 - 107 mmol/L Final   • CO2 09/28/2021 25.1  22.0 - 29.0 mmol/L Final   • Calcium 09/28/2021 9.5  8.6 - 10.5 mg/dL Final   • Total Protein 09/28/2021 6.3  6.0 - 8.5 g/dL Final   • Albumin 09/28/2021 4.20  3.50 - 5.20 g/dL Final   • ALT (SGPT) 09/28/2021 17  1 - 33 U/L Final   • AST (SGOT) 09/28/2021 21  1 - 32 U/L Final   • Alkaline Phosphatase 09/28/2021 68  39 - 117 U/L Final   • Total Bilirubin 09/28/2021 0.4  0.0 - 1.2 mg/dL Final   • eGFR Non  Amer 09/28/2021 80  >60 mL/min/1.73 Final   • Globulin 09/28/2021 2.1  gm/dL Final   • A/G Ratio 09/28/2021 2.0  g/dL Final   • BUN/Creatinine Ratio 09/28/2021 20.0  7.0 - 25.0 Final   • Anion Gap 09/28/2021 8.9  5.0 - 15.0 mmol/L Final   • Total Cholesterol 09/28/2021 238* 0 - 200 mg/dL Final   • Triglycerides  09/28/2021 165* 0 - 150 mg/dL Final   • HDL Cholesterol 09/28/2021 42  40 - 60 mg/dL Final   • LDL Cholesterol  09/28/2021 166* 0 - 100 mg/dL Final   • VLDL Cholesterol 09/28/2021 30  5 - 40 mg/dL Final   • LDL/HDL Ratio 09/28/2021 3.88   Final         Assessment/Plan   Problems Addressed this Visit     None      Visit Diagnoses     Post traumatic stress disorder (PTSD)  (Chronic)   -  Primary    Relevant Medications    Brexpiprazole (Rexulti) 0.5 MG tablet    sertraline (Zoloft) 100 MG tablet    Severe episode of recurrent major depressive disorder, without psychotic features (HCC)  (Chronic)       Relevant Medications    Brexpiprazole (Rexulti) 0.5 MG tablet    sertraline (Zoloft) 100 MG tablet    Nightmares associated with chronic post-traumatic stress disorder  (Chronic)       Relevant Medications    Brexpiprazole (Rexulti) 0.5 MG tablet    sertraline (Zoloft) 100 MG tablet    prazosin (MINIPRESS) 1 MG capsule      Diagnoses       Codes Comments    Post traumatic stress disorder (PTSD)    -  Primary ICD-10-CM: F43.10  ICD-9-CM: 309.81     Severe episode of recurrent major depressive disorder, without psychotic features (HCC)     ICD-10-CM: F33.2  ICD-9-CM: 296.33     Nightmares associated with chronic post-traumatic stress disorder     ICD-10-CM: F51.5, F43.12  ICD-9-CM: 307.47, 309.81           Visit Diagnoses:    ICD-10-CM ICD-9-CM   1. Post traumatic stress disorder (PTSD)  F43.10 309.81   2. Severe episode of recurrent major depressive disorder, without psychotic features (HCC)  F33.2 296.33   3. Nightmares associated with chronic post-traumatic stress disorder  F51.5 307.47    F43.12 309.81          GOALS:  Short Term Goals: Patient will be compliant with medication, and patient will have no significant medication related side effects.  Patient will be engaged in psychotherapy as indicated.  Patient will report subjective improvement of symptoms.  Long term goals: To stabilize mood and treat/improve  subjective symptoms, the patient will stay out of the hospital, the patient will be at an optimal level of functioning, and the patient will take all medications as prescribed.  The patient verbalized understanding and agreement with goals that were mutually set.      TREATMENT PLAN:   Continue supportive psychotherapy efforts and medications as indicated.   -Continue Zoloft 150 mg PO Daily for depression and anxiety.   -Continue Trazodone 150 mg PO QHS PRN for sleep   -Continue Wellbutrin  mg PO QAM for depression from PCP. This APRN will take over prescription when a refill is needed. Pt pt does well on Rexulti with no side effects, will look to taper off Wellbutrin XL   -Continue prazosin 1 mg PO QHS for nightmares  -Start Rexulti 0.5 mg PO Daily for depression and anxiety. Will monitor weight.   -Pt is prescribed Valium 5 mg PO every 8 hours PRN for anxiety/sleep from PCP. This APRN explained to pt that she does not prescribe long-term benzodiazepines due to the increased risk of addiction and dementia. Explained to pt that benzodiazepines are a high risk medication and were meant to be a temporary medication solution. First line treatment includes antidepressants and therapy. This APRN will try to find pt an alternative medication to treat her symptoms. Pt states that she will continue getting a prescription from her PCP in the meantime.    Medication and treatment options, both pharmacological and non-pharmacological treatment options, discussed during today's visit, including any off label use of medication. Patient acknowledged and verbally consented with current treatment plan and was educated on the importance of compliance with treatment and follow-up appointments.        MEDICATION ISSUES:    Discussed treatment plan and medication options of prescribed medication as well as the risks, benefits, any black box warnings, and side effects including potential falls, possible impaired driving, and  metabolic adversities among others, including any off label use of medication. Patient is agreeable to call the office with any worsening of symptoms or onset of side effects, or if any concerns or questions arise.  The contact information for the office is made available to the patient. Patient is agreeable to call 911 or go to the nearest ER should they begin having any SI/HI, or if any urgent concerns arise. No medication side effects or related complaints today.       MEDS ORDERED DURING VISIT:  New Medications Ordered This Visit   Medications   • Brexpiprazole (Rexulti) 0.5 MG tablet     Sig: Take 0.5 mg by mouth Daily.     Dispense:  30 tablet     Refill:  0   • sertraline (Zoloft) 100 MG tablet     Sig: Take 1.5 tablets PO Daily     Dispense:  45 tablet     Refill:  0   • prazosin (MINIPRESS) 1 MG capsule     Sig: Take 1 capsule by mouth Every Night.     Dispense:  30 capsule     Refill:  0       Return in about 4 weeks (around 2/16/2022), or if symptoms worsen or fail to improve, for Recheck.     Treatment plan completed: 10/28/21    Progress toward goal: Not at goal    Functional Status: Moderate impairment     Prognosis: Fair with Ongoing Treatment         This document has been electronically signed by CHIP Edward  January 19, 2022 14:33 EST     Some of the data in this electronic note has been brought forward from a previous encounter, any necessary changes have been made, it has been reviewed by this APRN, and it is accurate.    Please note that portions of this note were completed with a voice recognition program. Efforts were made to edit dictation, but occasionally words are mistranscribed.

## 2022-01-23 PROBLEM — F33.1 MODERATE EPISODE OF RECURRENT MAJOR DEPRESSIVE DISORDER: Status: ACTIVE | Noted: 2022-01-23

## 2022-02-05 DIAGNOSIS — J30.89 PERENNIAL ALLERGIC RHINITIS: ICD-10-CM

## 2022-02-07 RX ORDER — FLUTICASONE PROPIONATE 50 MCG
SPRAY, SUSPENSION (ML) NASAL
Qty: 16 ML | Refills: 2 | Status: SHIPPED | OUTPATIENT
Start: 2022-02-07 | End: 2022-07-07

## 2022-02-16 ENCOUNTER — TELEMEDICINE (OUTPATIENT)
Dept: PSYCHIATRY | Facility: CLINIC | Age: 57
End: 2022-02-16

## 2022-02-16 DIAGNOSIS — F33.1 MODERATE EPISODE OF RECURRENT MAJOR DEPRESSIVE DISORDER: Primary | ICD-10-CM

## 2022-02-16 PROCEDURE — 90837 PSYTX W PT 60 MINUTES: CPT | Performed by: COUNSELOR

## 2022-02-17 ENCOUNTER — TELEMEDICINE (OUTPATIENT)
Dept: PSYCHIATRY | Facility: CLINIC | Age: 57
End: 2022-02-17

## 2022-02-17 DIAGNOSIS — F43.10 POST TRAUMATIC STRESS DISORDER (PTSD): Primary | Chronic | ICD-10-CM

## 2022-02-17 DIAGNOSIS — F51.5 NIGHTMARES ASSOCIATED WITH CHRONIC POST-TRAUMATIC STRESS DISORDER: Chronic | ICD-10-CM

## 2022-02-17 DIAGNOSIS — F33.2 SEVERE EPISODE OF RECURRENT MAJOR DEPRESSIVE DISORDER, WITHOUT PSYCHOTIC FEATURES: Chronic | ICD-10-CM

## 2022-02-17 DIAGNOSIS — F43.12 NIGHTMARES ASSOCIATED WITH CHRONIC POST-TRAUMATIC STRESS DISORDER: Chronic | ICD-10-CM

## 2022-02-17 PROCEDURE — 99214 OFFICE O/P EST MOD 30 MIN: CPT | Performed by: NURSE PRACTITIONER

## 2022-02-17 RX ORDER — PRAZOSIN HYDROCHLORIDE 2 MG/1
2 CAPSULE ORAL NIGHTLY
Qty: 30 CAPSULE | Refills: 0 | Status: SHIPPED | OUTPATIENT
Start: 2022-02-17 | End: 2022-03-09 | Stop reason: SDUPTHER

## 2022-02-17 RX ORDER — BREXPIPRAZOLE 1 MG/1
1 TABLET ORAL DAILY
Qty: 30 TABLET | Refills: 0 | Status: SHIPPED | OUTPATIENT
Start: 2022-02-17 | End: 2022-03-09 | Stop reason: DRUGHIGH

## 2022-02-17 RX ORDER — SERTRALINE HYDROCHLORIDE 100 MG/1
TABLET, FILM COATED ORAL
Qty: 45 TABLET | Refills: 0 | Status: SHIPPED | OUTPATIENT
Start: 2022-02-17 | End: 2022-03-09 | Stop reason: SDUPTHER
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
## 2022-03-03 ENCOUNTER — TELEPHONE (OUTPATIENT)
Dept: INTERNAL MEDICINE | Facility: CLINIC | Age: 57
End: 2022-03-03

## 2022-03-03 ENCOUNTER — HOSPITAL ENCOUNTER (OUTPATIENT)
Dept: GENERAL RADIOLOGY | Facility: HOSPITAL | Age: 57
Discharge: HOME | End: 2022-03-03
Admitting: INTERNAL MEDICINE

## 2022-03-03 ENCOUNTER — OFFICE VISIT (OUTPATIENT)
Dept: INTERNAL MEDICINE | Facility: CLINIC | Age: 57
End: 2022-03-03

## 2022-03-03 VITALS
HEIGHT: 65 IN | SYSTOLIC BLOOD PRESSURE: 146 MMHG | WEIGHT: 193 LBS | BODY MASS INDEX: 32.15 KG/M2 | TEMPERATURE: 97.8 F | DIASTOLIC BLOOD PRESSURE: 82 MMHG

## 2022-03-03 DIAGNOSIS — R07.81 RIB PAIN ON RIGHT SIDE: Primary | ICD-10-CM

## 2022-03-03 PROCEDURE — 71046 X-RAY EXAM CHEST 2 VIEWS: CPT

## 2022-03-03 PROCEDURE — 99213 OFFICE O/P EST LOW 20 MIN: CPT | Performed by: INTERNAL MEDICINE

## 2022-03-03 RX ORDER — METHYLPREDNISOLONE 4 MG/1
TABLET ORAL
Qty: 21 EACH | Refills: 0 | Status: SHIPPED | OUTPATIENT
Start: 2022-03-03 | End: 2022-03-09

## 2022-03-03 NOTE — PROGRESS NOTES
"Subjective   Vish Russo is a 56 y.o. female here for right-sided rib pain for 5 days.  It is not getting any better, it is tender to touch.  She has not noticed any rash.  She denies any shortness of breath.  She says when she coughs it does hurt and it also hurts when she takes a deep breath so she feels she has been breathing shallowly.  No injury to the area.    I have reviewed the following portions of the patient's history and confirmed they are accurate: current medications, past medical history, past social history and problem list     I have personally reviewed and performed the ROS. Argelia Mckeon MD     Review of Systems:  General: negative  CV: negative  Respiratory: negative  MSK: Rib pain    Objective   /82   Temp 97.8 °F (36.6 °C) (Temporal)   Ht 165.1 cm (65\")   Wt 87.5 kg (193 lb)   BMI 32.12 kg/m²     Physical Exam  Vitals reviewed.   Constitutional:       Appearance: She is well-developed.   Cardiovascular:      Rate and Rhythm: Normal rate and regular rhythm.      Heart sounds: Normal heart sounds.   Pulmonary:      Effort: Pulmonary effort is normal.      Breath sounds: Normal breath sounds. No wheezing, rhonchi or rales.   Musculoskeletal:        Arms:       Comments: Tender to light palpation over indicated area on right upper quadrant   Skin:     General: Skin is warm and dry.      Comments: No rash in right upper quadrant   Neurological:      Mental Status: She is alert and oriented to person, place, and time.   Psychiatric:         Behavior: Behavior normal.         Thought Content: Thought content normal.         Assessment/Plan   Diagnoses and all orders for this visit:    1. Rib pain on right side (Primary)  -     XR Chest PA & Lateral: negative. Will rx medrol to help ease inflammation and pain. If rash develops (shingles) stop med. Did discuss with pt that if this does prove to be shingles the steroid could possibly make this worse so there is some risk to " taking it  -ddx pleurisy, pneumonia, prodrome to shingles  -Continue ibuprofen and baclofen as needed             Argelia Mckeon MD  Answers for HPI/ROS submitted by the patient on 3/2/2022  Please describe your symptoms.: Right side; rib feels bruised or broken. Been going on for  about 4 days.Pain is unbearable.  Have you had these symptoms before?: No  How long have you been having these symptoms?: 1-4 days  What is the primary reason for your visit?: Other

## 2022-03-03 NOTE — TELEPHONE ENCOUNTER
----- Message from Argelia Mckeon MD sent at 3/3/2022 12:22 PM EST -----  Please call the patient tell her CXR normal. I will send in medrol. If she sees a rash stop the steroid. As I discussed when she was here there is some risk if it does prove to be shingles that steroid could make it worse. But I think if it's musculoskeletal (pleurisy included) then the steroid will help the pain to go away.

## 2022-03-03 NOTE — ADDENDUM NOTE
Addended by: ROSITA PHILLIPS on: 3/3/2022 12:21 PM     Modules accepted: Orders, Level of Service

## 2022-03-03 NOTE — TELEPHONE ENCOUNTER
PT REQUEST A CALL BACK FROM [facility], STATED THAT SHE RECEIVED A CALL FROM HER EARLIER TODAY, BUT WAS HALF SLEEP AND DID NOT GET ALL THE INFORMATION THAT WAS GIVEN,    PLEASE ADVISE.  CALL BACK: [phone]

## 2022-03-07 ENCOUNTER — TELEPHONE (OUTPATIENT)
Dept: INTERNAL MEDICINE | Facility: CLINIC | Age: 57
End: 2022-03-07

## 2022-03-07 RX ORDER — MELOXICAM 7.5 MG/1
7.5 TABLET ORAL DAILY
Qty: 30 TABLET | Refills: 0 | Status: SHIPPED | OUTPATIENT
Start: 2022-03-07 | End: 2022-03-09

## 2022-03-07 NOTE — TELEPHONE ENCOUNTER
Pt states ever since she started taking the steroid she has been feeling weird, she said her face started to swell and turn red, also she is nauseas. She said she took her Bp yesterday and it was 159/83, so she stopped taking the steroid and started taking ibprofen to help with th pain.  Pt would like something else called in, please advise.

## 2022-03-07 NOTE — TELEPHONE ENCOUNTER
She says meloxicam makes her dizzy. She cant take it. I do see that Dr. Alaniz gave her meloxicam in 2016.    Also says she is coughing a lot and forgot to tell you.

## 2022-03-07 NOTE — TELEPHONE ENCOUNTER
I sent in meloxicam, don't take ibu or other NSAIDs with it. It should help with pain/inflammation. If it's still present end of week I'm ordering CT scan so tell her to LMK.

## 2022-03-07 NOTE — TELEPHONE ENCOUNTER
Spoke with her, she says the steroid didn't help her at. She got no relief in her pain. She isn't able to sleep especially if she rolls over on that side. She is asking what you suggest. Says she needs something to help with the pain

## 2022-03-09 ENCOUNTER — TELEMEDICINE (OUTPATIENT)
Dept: PSYCHIATRY | Facility: CLINIC | Age: 57
End: 2022-03-09

## 2022-03-09 DIAGNOSIS — F43.12 NIGHTMARES ASSOCIATED WITH CHRONIC POST-TRAUMATIC STRESS DISORDER: Chronic | ICD-10-CM

## 2022-03-09 DIAGNOSIS — F43.10 POST TRAUMATIC STRESS DISORDER (PTSD): Chronic | ICD-10-CM

## 2022-03-09 DIAGNOSIS — F51.5 NIGHTMARES ASSOCIATED WITH CHRONIC POST-TRAUMATIC STRESS DISORDER: Chronic | ICD-10-CM

## 2022-03-09 DIAGNOSIS — F33.2 SEVERE EPISODE OF RECURRENT MAJOR DEPRESSIVE DISORDER, WITHOUT PSYCHOTIC FEATURES: Primary | Chronic | ICD-10-CM

## 2022-03-09 PROCEDURE — 99214 OFFICE O/P EST MOD 30 MIN: CPT | Performed by: NURSE PRACTITIONER

## 2022-03-09 RX ORDER — SERTRALINE HYDROCHLORIDE 100 MG/1
TABLET, FILM COATED ORAL
Qty: 45 TABLET | Refills: 0 | Status: SHIPPED | OUTPATIENT
Start: 2022-03-09 | End: 2022-05-05 | Stop reason: SDUPTHER

## 2022-03-09 RX ORDER — BREXPIPRAZOLE 2 MG/1
2 TABLET ORAL DAILY
Qty: 30 TABLET | Refills: 0 | Status: SHIPPED | OUTPATIENT
Start: 2022-03-09 | End: 2022-05-05 | Stop reason: SDUPTHER

## 2022-03-09 RX ORDER — PRAZOSIN HYDROCHLORIDE 2 MG/1
2 CAPSULE ORAL NIGHTLY
Qty: 30 CAPSULE | Refills: 0 | Status: SHIPPED | OUTPATIENT
Start: 2022-03-09 | End: 2022-05-18 | Stop reason: SDUPTHER

## 2022-03-10 ENCOUNTER — TELEMEDICINE (OUTPATIENT)
Dept: PSYCHIATRY | Facility: CLINIC | Age: 57
End: 2022-03-10

## 2022-03-10 DIAGNOSIS — F43.10 POST TRAUMATIC STRESS DISORDER (PTSD): Primary | ICD-10-CM

## 2022-03-10 PROCEDURE — 90834 PSYTX W PT 45 MINUTES: CPT | Performed by: COUNSELOR

## 2022-03-10 NOTE — PROGRESS NOTES
"Date: March 10, 2022  Time In: 10:45 AM   Time Out: 11:33 AM   This provider is located at [facility]), [address] [place] [address] using a secure [employer] Video Visit through [employer]. Patient is being seen remotely via telehealth at home address in [place] and stated they are in a secure environment for this session. The patient's condition being diagnosed/treated is appropriate for telemedicine. The provider identified herself as well as her credentials. The patient, and/or patients guardian, consent to be seen remotely, and when consent is given they understand that the consent allows for patient identifiable information to be sent to a third party as needed. They may refuse to be seen remotely at any time. The electronic data is encrypted and password protected, and the patient and/or guardian has been advised of the potential risks to privacy not withstanding such measures.     You have chosen to receive care through a telehealth visit.  Do you consent to use a video/audio connection for your medical care today? Yes    PROGRESS NOTE  Data:  Vish Russo is a 56 y.o. female who presents today for follow up. Patient states that she fell asleep early last night and woke up early and this happens when she goes to sleep early. Patient states that she is feeling better and feels that her medication is working well. Patient states that her mother is now being \"sickening\" sweet with her actions and words. Patient states that her sister got a full time job and isn't around much and the patient has been maintaining her boundaries with her parents as she feels that it could be due to her mother's need to have someone available to her. Patient states that her mother has to the upper hand and will try to make any situation she is in worse than the other person. Patient acknowledges that her mother displays narcissistic tendencies and states that " she never wants to accept her responsibility in anything and tries to make herself into the victim most of the time. Patient states that she is doing better with putting boundaries in place but it is hard to enforce them at times. Patient is struggling with feelings of not fitting in anywhere; discussed the contributions that she makes to her family and at her job and the qualities that she possesses as a friend.    Chief Complaint: paranoia, irregular sleep, depression, anxiety, feeling that she doesn't fit in anywhere.    History of Present Illness: Patient has struggled with depression, anxiety and symptoms of PTSD for several years.      Clinical Maneuvering/Intervention:  CBT    (Scales based on 0 - 10 with 10 being the worst)  Depression: 6-7 Anxiety: 6-7       Assisted patient in processing above session content; acknowledged and normalized patient’s thoughts, feelings, and concerns.  Rationalized patient thought process regarding her relationship with her mother and her feelings of not fitting in with others.  Discussed triggers associated with patient's feelings of anxiety and depression such as her mother's behavior toward her, failed relationships and not feeling that she fits in with others.  Also discussed coping skills for patient to implement such as maintaining boundaries with others, reminding herself of her contributions in all areas of her life on a regular basis and looking for activities to get involved in to expand her social [place].    Allowed patient to freely discuss issues without interruption or judgment. Provided safe, confidential environment to facilitate the development of positive therapeutic relationship and encourage open, honest communication. Assisted patient in identifying risk factors which would indicate the need for higher level of care including thoughts to harm self or others and/or self-harming behavior and encouraged patient to contact this office, call 911, or present to  the nearest emergency room should any of these events occur. Discussed crisis intervention services and means to access. Patient adamantly and convincingly denies current suicidal or homicidal ideation or perceptual disturbance.    Assessment:   Assessment   Patient appears to maintain relative stability as compared to their baseline.  However, patient continues to struggle with depression, anxiety and other symptoms of PTSD which continues to cause impairment in important areas of functioning.  A result, they can be reasonably expected to continue to benefit from treatment and would likely be at increased risk for decompensation otherwise.    Mental Status Exam:   Hygiene:   fair  Cooperation:  Cooperative  Eye Contact:  Good  Psychomotor Behavior:  Appropriate  Affect:  Appropriate  Mood: fluctates  Speech:  Normal  Thought Process:  Goal directed  Thought Content:  Normal  Suicidal:  None  Homicidal:  None  Hallucinations:  None  Delusion:  None  Memory:  Intact  Orientation:  Person, Place, Time and Situation  Reliability:  good  Insight:  Good  Judgement:  Good  Impulse Control:  Good  Physical/Medical Issues:  No        Patient's Support Network Includes:  children, extended family and friends    Functional Status: Moderate impairment     Progress toward goal: Not at goal    Prognosis: Good with Ongoing Treatment          Plan:    Patient will continue in individual outpatient therapy with focus on improved functioning and coping skills, maintaining stability, and avoiding decompensation and the need for higher level of care.    Patient will adhere to medication regimen as prescribed and report any side effects. Patient will contact this office, call 911 or present to the nearest emergency room should suicidal or homicidal ideations occur. Provide Cognitive Behavioral Therapy and Solution Focused Therapy to improve functioning, maintain stability, and avoid decompensation and the need for higher level of care.      Return in about 3 weeks, or earlier if symptoms worsen or fail to improve.           VISIT DIAGNOSIS:     ICD-10-CM ICD-9-CM   1. Post traumatic stress disorder (PTSD)  F43.10 309.81             This document has been electronically signed by ERICK Dillard  March 10, 2022 11:32 EST      Part of this note may be an electronic transcription/translation of spoken language to printed text using the Dragon Dictation System.

## 2022-03-15 RX ORDER — BACLOFEN 20 MG/1
TABLET ORAL
Qty: 60 TABLET | Refills: 5 | Status: SHIPPED | OUTPATIENT
Start: 2022-03-15 | End: 2023-02-24

## 2022-04-05 ENCOUNTER — TELEMEDICINE (OUTPATIENT)
Dept: PSYCHIATRY | Facility: CLINIC | Age: 57
End: 2022-04-05

## 2022-04-05 DIAGNOSIS — F43.10 POST TRAUMATIC STRESS DISORDER (PTSD): Primary | ICD-10-CM

## 2022-04-05 PROCEDURE — 90834 PSYTX W PT 45 MINUTES: CPT | Performed by: COUNSELOR

## 2022-04-05 NOTE — PROGRESS NOTES
Date: April 12, 2022  Time In: 8:35 AM   Time Out: 9:23 AM  This provider is located at the [facility]), [address], [place] [address] using a secure [employer] Video Visit through [employer]. Patient is being seen remotely via telehealth at home address in [place] and stated they are in a secure environment for this session. The patient's condition being diagnosed/treated is appropriate for telemedicine. The provider identified herself as well as her credentials. The patient, and/or patients guardian, consent to be seen remotely, and when consent is given they understand that the consent allows for patient identifiable information to be sent to a third party as needed. They may refuse to be seen remotely at any time. The electronic data is encrypted and password protected, and the patient and/or guardian has been advised of the potential risks to privacy not withstanding such measures.     You have chosen to receive care through a telehealth visit.  Do you consent to use a video/audio connection for your medical care today? Yes    PROGRESS NOTE  Data:  Vish Russo is a 57 y.o. female who presents today for follow up.  Patient states that she has been distancing herself from her mother as her mother is now going through testing for Hunnington's Disease and has started to display the signs of the disease. Patient states that her mother demand for her and her 's hair be cut on [employer]. David's Day. Patient states that she hates the month of April due to the anniversary of her grandmother's death the day before her birthday, her birthday, starting to lose her twins and then having them pass away on the 12th. Patient states that she will be starting to work at [employer] on Friday and will be the only  in the [employer] that she will be working in so she feels that she is going to work as much as she can throughout the month. Patient states  that she has talked with her sister about the possibility of putting her parents in an assisted living facility to take the pressure off of them but her sister is not fully on board with this idea at this time as patient states that her sister continues to baby her parents when she is around. Patient continues to keep communication with her mother short and brief to avoid extra anxiety and frustration. Patient states that she has seen her feelings of anxiety and depression fluctuate based on situations but states that she is hopeful to get through the month.        Chief Complaint: depression, anxiety,    History of Present Illness: patient has battled symptoms of PTSD for several years      Clinical Maneuvering/Intervention:  CBT    (Scales based on 0 - 10 with 10 being the worst)  Depression: 4-7 Anxiety: 0-10       Assisted patient in processing above session content; acknowledged and normalized patient’s thoughts, feelings, and concerns.  Rationalized patient thought process regarding the events of the month.  Discussed triggers associated with patient's anxiety and depression such as anniversary dates and more issues with her mother  Also discussed coping skills for patient to implement such as talking to her doctor about the possibility of restless leg syndrome, maintaining boundaries with others, and doing things to celebrate her lost loved ones.     Allowed patient to freely discuss issues without interruption or judgment. Provided safe, confidential environment to facilitate the development of positive therapeutic relationship and encourage open, honest communication. Assisted patient in identifying risk factors which would indicate the need for higher level of care including thoughts to harm self or others and/or self-harming behavior and encouraged patient to contact this office, call 911, or present to the nearest emergency room should any of these events occur. Discussed crisis intervention services and  means to access. Patient adamantly and convincingly denies current suicidal or homicidal ideation or perceptual disturbance.    Assessment:   Assessment   Patient appears to maintain relative stability as compared to their baseline.  However, patient continues to struggle with symptoms of PTSD which continues to cause impairment in important areas of functioning.  A result, they can be reasonably expected to continue to benefit from treatment and would likely be at increased risk for decompensation otherwise.    Mental Status Exam:   Hygiene:   good  Cooperation:  Cooperative  Eye Contact:  Good  Psychomotor Behavior:  Appropriate  Affect:  Appropriate  Mood: normal  Speech:  Normal  Thought Process:  Goal directed  Thought Content:  Normal  Suicidal:  None  Homicidal:  None  Hallucinations:  None  Delusion:  None  Memory:  Intact  Orientation:  Person, Place, Time and Situation  Reliability:  good  Insight:  Good  Judgement:  Good  Impulse Control:  Good  Physical/Medical Issues:  No        Patient's Support Network Includes:  extended family and friends    Functional Status: Moderate impairment     Progress toward goal: Not at goal    Prognosis: Good with Ongoing Treatment          Plan:    Patient will continue in individual outpatient therapy with focus on improved functioning and coping skills, maintaining stability, and avoiding decompensation and the need for higher level of care.    Patient will adhere to medication regimen as prescribed and report any side effects. Patient will contact this office, call 911 or present to the nearest emergency room should suicidal or homicidal ideations occur. Provide Cognitive Behavioral Therapy and Solution Focused Therapy to improve functioning, maintain stability, and avoid decompensation and the need for higher level of care.     Return in about 1 weeks, or earlier if symptoms worsen or fail to improve.           VISIT DIAGNOSIS:     ICD-10-CM ICD-9-CM   1. Post  traumatic stress disorder (PTSD)  F43.10 309.81             This document has been electronically signed by ERICK Dillard  April 12, 2022 09:26 EDT      Part of this note may be an electronic transcription/translation of spoken language to printed text using the Dragon Dictation System.

## 2022-04-08 ENCOUNTER — TELEMEDICINE (OUTPATIENT)
Dept: INTERNAL MEDICINE | Facility: CLINIC | Age: 57
End: 2022-04-08

## 2022-04-08 DIAGNOSIS — R11.2 NAUSEA AND VOMITING, UNSPECIFIED VOMITING TYPE: Primary | ICD-10-CM

## 2022-04-08 DIAGNOSIS — R50.9 FEVER AND CHILLS: ICD-10-CM

## 2022-04-08 DIAGNOSIS — R19.7 DIARRHEA, UNSPECIFIED TYPE: ICD-10-CM

## 2022-04-08 PROCEDURE — 99214 OFFICE O/P EST MOD 30 MIN: CPT | Performed by: NURSE PRACTITIONER

## 2022-04-08 RX ORDER — ONDANSETRON 8 MG/1
TABLET, ORALLY DISINTEGRATING ORAL
Qty: 30 TABLET | Refills: 1 | Status: SHIPPED | OUTPATIENT
Start: 2022-04-08 | End: 2022-05-27 | Stop reason: SDUPTHER

## 2022-04-08 NOTE — PROGRESS NOTES
Subjective   Chief Complaint   Patient presents with   • Vomiting   • Diarrhea      This is video visit.  You have chosen to receive care through a video visit today. Do you consent to use a video visit for your medical care today? Yes    Vish Russo is a 57 y.o. female here today for fever, vomiting, diarrhea, headache.  The symptoms started yesterday.  She is requesting some Zofran.  The fever has been 100-101.  She is requesting some Zofran.  She states she feels weak.  She is requesting a work note for tonight and tomorrow.  Suppose to surjit at [facility].  She states her belly is sore from vomiting so much.  She took a home Covid test which was negative    I have reviewed the following portions of the patient's history and confirmed they are accurate: allergies, current medications, past family history, past medical history, past social history, past surgical history and problem list    I have personally completed the patient's review of systems.     Review of Systems   Constitutional: Positive for fever. Negative for activity change, appetite change and fatigue.   HENT: Negative for congestion.    Respiratory: Negative for cough and shortness of breath.    Cardiovascular: Negative for chest pain and leg swelling.   Gastrointestinal: Positive for abdominal pain, diarrhea, nausea and vomiting.   Neurological: Negative for dizziness, weakness and confusion.   Psychiatric/Behavioral: Negative for behavioral problems and decreased concentration.       Current Outpatient Medications on File Prior to Visit   Medication Sig   • baclofen (LIORESAL) 20 MG tablet TAKE ONE TABLET BY MOUTH TWICE A DAY   • Brexpiprazole (Rexulti) 2 MG tablet Take 1 tablet by mouth Daily.   • diazePAM (VALIUM) 5 MG tablet TAKE ONE TABLET BY MOUTH EVERY 8 HOURS AS NEEDED FOR ANXIETY/SLEEP   • estradiol (ESTRACE VAGINAL) 0.1 MG/GM vaginal cream 1g PV 3x weekly x2 weeks   • fluticasone (FLONASE) 50 MCG/ACT nasal spray SPRAY TWO SPRAYS IN  EACH NOSTRIL DAILY   • prazosin (MINIPRESS) 2 MG capsule Take 1 capsule by mouth Every Night.   • sertraline (Zoloft) 100 MG tablet Take 1.5 tablets PO Daily   • Synthroid 75 MCG tablet TAKE ONE TABLET BY MOUTH EVERY MORNING WHILE FASTING. WAIT ONE HOUR BEFORE EATING OR TAKING OTHER MEDICATION.   • traZODone (DESYREL) 100 MG tablet Take 1.5 tablets by mouth Every Night.     No current facility-administered medications on file prior to visit.       Objective   There were no vitals filed for this visit.  There is no height or weight on file to calculate BMI.    Physical Exam  Constitutional:       Appearance: Normal appearance.      Comments: Appears ill   Pulmonary:      Effort: No respiratory distress ( Speaking full sentences without difficulty).   Neurological:      General: No focal deficit present.      Mental Status: She is alert and oriented to person, place, and time. Mental status is at baseline.   Psychiatric:         Mood and Affect: Mood normal.         Assessment/Plan   Problem List Items Addressed This Visit    None     Visit Diagnoses     Nausea and vomiting, unspecified vomiting type    -  Primary    Relevant Medications    ondansetron ODT (ZOFRAN-ODT) 8 MG disintegrating tablet    Diarrhea, unspecified type        Fever and chills             MyChart video visit  Pt in [place] during visit  GI symptoms are likely viral  Rx for Zofran  Recommend sprite  BRAT diet  Rest and fluids  Work note x2 days       Current Outpatient Medications:   •  baclofen (LIORESAL) 20 MG tablet, TAKE ONE TABLET BY MOUTH TWICE A DAY, Disp: 60 tablet, Rfl: 5  •  Brexpiprazole (Rexulti) 2 MG tablet, Take 1 tablet by mouth Daily., Disp: 30 tablet, Rfl: 0  •  diazePAM (VALIUM) 5 MG tablet, TAKE ONE TABLET BY MOUTH EVERY 8 HOURS AS NEEDED FOR ANXIETY/SLEEP, Disp: 90 tablet, Rfl: 2  •  estradiol (ESTRACE VAGINAL) 0.1 MG/GM vaginal cream, 1g PV 3x weekly x2 weeks, Disp: 42.5 g, Rfl: 0  •  fluticasone (FLONASE) 50 MCG/ACT nasal spray,  SPRAY TWO SPRAYS IN EACH NOSTRIL DAILY, Disp: 16 mL, Rfl: 2  •  ondansetron ODT (ZOFRAN-ODT) 8 MG disintegrating tablet, Take 1 tablet by mouth (dissolve under tongue or swallow) every 8 hours as needed for nausea., Disp: 30 tablet, Rfl: 1  •  prazosin (MINIPRESS) 2 MG capsule, Take 1 capsule by mouth Every Night., Disp: 30 capsule, Rfl: 0  •  sertraline (Zoloft) 100 MG tablet, Take 1.5 tablets PO Daily, Disp: 45 tablet, Rfl: 0  •  Synthroid 75 MCG tablet, TAKE ONE TABLET BY MOUTH EVERY MORNING WHILE FASTING. WAIT ONE HOUR BEFORE EATING OR TAKING OTHER MEDICATION., Disp: 30 tablet, Rfl: 5  •  traZODone (DESYREL) 100 MG tablet, Take 1.5 tablets by mouth Every Night., Disp: 135 tablet, Rfl: 0       Plan of care reviewed with the patient at the conclusion of today's visit.  Education was provided regarding diagnosis, management, and any prescribed or recommended OTC medications.  Patient verbalized understanding of and agreement with management plan.     Return if symptoms worsen or fail to improve.      Blake Puente, APRN

## 2022-04-12 ENCOUNTER — TELEMEDICINE (OUTPATIENT)
Dept: PSYCHIATRY | Facility: CLINIC | Age: 57
End: 2022-04-12

## 2022-04-12 DIAGNOSIS — F43.10 POST TRAUMATIC STRESS DISORDER (PTSD): Primary | ICD-10-CM

## 2022-04-12 PROCEDURE — 90834 PSYTX W PT 45 MINUTES: CPT | Performed by: COUNSELOR

## 2022-04-12 NOTE — PROGRESS NOTES
Date: April 12, 2022  Time In: 8:33 AM  Time Out: 9:20 AM   This provider is located at the [facility] ([facility]), [address], [place] [address] using a secure [employer] Video Visit through [employer]. Patient is being seen remotely via telehealth at home address in [place] and stated they are in a secure environment for this session. The patient's condition being diagnosed/treated is appropriate for telemedicine. The provider identified herself as well as her credentials. The patient, and/or patients guardian, consent to be seen remotely, and when consent is given they understand that the consent allows for patient identifiable information to be sent to a third party as needed. They may refuse to be seen remotely at any time. The electronic data is encrypted and password protected, and the patient and/or guardian has been advised of the potential risks to privacy not withstanding such measures.     You have chosen to receive care through a telehealth visit.  Do you consent to use a video/audio connection for your medical care today? Yes    PROGRESS NOTE  Data:  Vish Russo is a 57 y.o. female who presents today for follow up.  Patient states that today is the anniversary date of the death of her babies. Patient states that April is just a tough month for her. Patient states that her birthday was celebrated but somewhat overshadowed by her parent's anniversary which was the day before. Patient states that her dinner was bought by her niece but she received nothing from her parents or her sister. Patient states that she was happy to get to spend time with her grandchildren last week and that helped her deal with filling up time last week. Patient states that she got very ill with a stomach virus and missed her first couple of days of working at "[employer] and worked the 3rd day while she was still sick and running an fever. Patient states that she is sad  today and wishes that she was not going to work today but the benefits of not being alone were discussed and state that she is aware that she should stay busy today and rest when she gets home. Discussed visiting the graves of the twins but the patient doesn't remember where they are and is hesitant to ask her ex  the location as she discussed circumstances around the loss of the twins. Patient states that her dog is a good support for her and helps her on her bad days when nobody else seems to understand. Discussed making a plan for a celebration for when she gets through the month; patient discussed how she would like to get up with some friends to have a girl's weekend.    Chief Complaint: anxiety, depression, flashbacks, decreased night anand, restless leg type actions    History of Present Illness: Patient has battled symptoms of PTSD for years      Clinical Maneuvering/Intervention:  CBT    (Scales based on 0 - 10 with 10 being the worst)  Depression: 9-10 Anxiety: fluctuates       Assisted patient in processing above session content; acknowledged and normalized patient’s thoughts, feelings, and concerns.  Rationalized patient thought process regarding the events from last week .  Discussed triggers associated with patient's anxiety and depression such as the anniversary dates of the death of her grandmother and her twin sons and the fact that she isn't supported by her family.  Also discussed coping skills for patient to implement such as increasing time with friends, doing something in memory of her twins, and calling her PMHNP to discuss medication concerns.    Allowed patient to freely discuss issues without interruption or judgment. Provided safe, confidential environment to facilitate the development of positive therapeutic relationship and encourage open, honest communication. Assisted patient in identifying risk factors which would indicate the need for higher level of care including thoughts to  harm self or others and/or self-harming behavior and encouraged patient to contact this office, call 911, or present to the nearest emergency room should any of these events occur. Discussed crisis intervention services and means to access. Patient adamantly and convincingly denies current suicidal or homicidal ideation or perceptual disturbance.    Assessment:   Assessment   Patient appears to maintain relative stability as compared to their baseline.  However, patient continues to struggle with symptoms of PTSD which continues to cause impairment in important areas of functioning.  A result, they can be reasonably expected to continue to benefit from treatment and would likely be at increased risk for decompensation otherwise.    Mental Status Exam:   Hygiene:   fair  Cooperation:  Cooperative  Eye Contact:  Good  Psychomotor Behavior:  Appropriate  Affect:  Appropriate  Mood: fluctates  Speech:  Normal  Thought Process:  Goal directed  Thought Content:  Normal  Suicidal:  None  Homicidal:  None  Hallucinations:  None  Delusion:  None  Memory:  Intact  Orientation:  Person, Place, Time and Situation  Reliability:  good  Insight:  Good  Judgement:  Good  Impulse Control:  Good  Physical/Medical Issues:  No        Patient's Support Network Includes:  extended family and friends    Functional Status: Moderate impairment     Progress toward goal: Not at goal    Prognosis: Good with Ongoing Treatment          Plan:    Patient will continue in individual outpatient therapy with focus on improved functioning and coping skills, maintaining stability, and avoiding decompensation and the need for higher level of care.    Patient will adhere to medication regimen as prescribed and report any side effects. Patient will contact this office, call 911 or present to the nearest emergency room should suicidal or homicidal ideations occur. Provide Cognitive Behavioral Therapy and Solution Focused Therapy to improve functioning,  maintain stability, and avoid decompensation and the need for higher level of care.     Return in about 1 weeks, or earlier if symptoms worsen or fail to improve.           VISIT DIAGNOSIS:     ICD-10-CM ICD-9-CM   1. Post traumatic stress disorder (PTSD)  F43.10 309.81             This document has been electronically signed by ERICK Dillard  April 12, 2022 11:57 EDT      Part of this note may be an electronic transcription/translation of spoken language to printed text using the Dragon Dictation System.

## 2022-04-19 ENCOUNTER — TELEMEDICINE (OUTPATIENT)
Dept: PSYCHIATRY | Facility: CLINIC | Age: 57
End: 2022-04-19

## 2022-04-19 DIAGNOSIS — F43.10 POST TRAUMATIC STRESS DISORDER (PTSD): Primary | ICD-10-CM

## 2022-04-19 PROCEDURE — 90837 PSYTX W PT 60 MINUTES: CPT | Performed by: COUNSELOR
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
## 2022-04-26 ENCOUNTER — TELEMEDICINE (OUTPATIENT)
Dept: PSYCHIATRY | Facility: CLINIC | Age: 57
End: 2022-04-26

## 2022-04-26 DIAGNOSIS — F43.10 POST TRAUMATIC STRESS DISORDER (PTSD): Primary | ICD-10-CM

## 2022-04-26 PROCEDURE — 90834 PSYTX W PT 45 MINUTES: CPT | Performed by: COUNSELOR
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
## 2022-05-02 ENCOUNTER — TELEMEDICINE (OUTPATIENT)
Dept: PSYCHIATRY | Facility: CLINIC | Age: 57
End: 2022-05-02

## 2022-05-02 DIAGNOSIS — F43.10 POST TRAUMATIC STRESS DISORDER (PTSD): Primary | ICD-10-CM

## 2022-05-02 PROCEDURE — 90837 PSYTX W PT 60 MINUTES: CPT | Performed by: COUNSELOR
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
## 2022-05-05 DIAGNOSIS — F43.10 POST TRAUMATIC STRESS DISORDER (PTSD): Chronic | ICD-10-CM

## 2022-05-05 DIAGNOSIS — F33.2 SEVERE EPISODE OF RECURRENT MAJOR DEPRESSIVE DISORDER, WITHOUT PSYCHOTIC FEATURES: Chronic | ICD-10-CM

## 2022-05-05 RX ORDER — BREXPIPRAZOLE 2 MG/1
2 TABLET ORAL DAILY
Qty: 30 TABLET | Refills: 0 | Status: SHIPPED | OUTPATIENT
Start: 2022-05-05 | End: 2022-05-18 | Stop reason: DRUGHIGH

## 2022-05-05 RX ORDER — SERTRALINE HYDROCHLORIDE 100 MG/1
TABLET, FILM COATED ORAL
Qty: 45 TABLET | Refills: 0 | Status: SHIPPED | OUTPATIENT
Start: 2022-05-05 | End: 2022-05-18 | Stop reason: SDUPTHER

## 2022-05-11 ENCOUNTER — OFFICE VISIT (OUTPATIENT)
Dept: INTERNAL MEDICINE | Facility: CLINIC | Age: 57
End: 2022-05-11

## 2022-05-11 VITALS
HEIGHT: 65 IN | TEMPERATURE: 97.5 F | BODY MASS INDEX: 32.65 KG/M2 | WEIGHT: 196 LBS | SYSTOLIC BLOOD PRESSURE: 122 MMHG | DIASTOLIC BLOOD PRESSURE: 74 MMHG

## 2022-05-11 DIAGNOSIS — M25.511 ACUTE PAIN OF RIGHT SHOULDER: Primary | ICD-10-CM
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
## 2022-05-11 DIAGNOSIS — F41.9 ANXIETY: ICD-10-CM

## 2022-05-11 PROCEDURE — 99213 OFFICE O/P EST LOW 20 MIN: CPT | Performed by: INTERNAL MEDICINE
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
## 2022-05-16 ENCOUNTER — TELEPHONE (OUTPATIENT)
Dept: INTERNAL MEDICINE | Facility: CLINIC | Age: 57
End: 2022-05-16

## 2022-05-16 DIAGNOSIS — M54.2 NECK PAIN: Primary | ICD-10-CM

## 2022-05-16 NOTE — TELEPHONE ENCOUNTER
Caller: Vish Russo    Relationship: Self    Best call back number: [phone]    What is the medical concern/diagnosis: NECK PAIN     What specialty or service is being requested: PHYSICAL THERAPY    What is the provider, practice or medical service name: N/A    What is the office location: [facility]     What is the office phone number: N/A    Any additional details:

## 2022-05-18 ENCOUNTER — TELEMEDICINE (OUTPATIENT)
Dept: PSYCHIATRY | Facility: CLINIC | Age: 57
End: 2022-05-18

## 2022-05-18 DIAGNOSIS — F51.5 NIGHTMARES ASSOCIATED WITH CHRONIC POST-TRAUMATIC STRESS DISORDER: Chronic | ICD-10-CM

## 2022-05-18 DIAGNOSIS — F43.12 NIGHTMARES ASSOCIATED WITH CHRONIC POST-TRAUMATIC STRESS DISORDER: Chronic | ICD-10-CM

## 2022-05-18 DIAGNOSIS — F33.1 MODERATE EPISODE OF RECURRENT MAJOR DEPRESSIVE DISORDER: Primary | Chronic | ICD-10-CM

## 2022-05-18 DIAGNOSIS — F43.10 POST TRAUMATIC STRESS DISORDER (PTSD): Chronic | ICD-10-CM

## 2022-05-18 PROCEDURE — 99214 OFFICE O/P EST MOD 30 MIN: CPT | Performed by: NURSE PRACTITIONER

## 2022-05-18 RX ORDER — PRAZOSIN HYDROCHLORIDE 2 MG/1
2 CAPSULE ORAL NIGHTLY
Qty: 30 CAPSULE | Refills: 1 | Status: SHIPPED | OUTPATIENT
Start: 2022-05-18 | End: 2022-12-28

## 2022-05-18 RX ORDER — BREXPIPRAZOLE 3 MG/1
1 TABLET ORAL DAILY
Qty: 30 TABLET | Refills: 1 | Status: SHIPPED | OUTPATIENT
Start: 2022-05-18 | End: 2022-12-28 | Stop reason: SDUPTHER

## 2022-05-18 RX ORDER — SERTRALINE HYDROCHLORIDE 100 MG/1
TABLET, FILM COATED ORAL
Qty: 45 TABLET | Refills: 1 | Status: SHIPPED | OUTPATIENT
Start: 2022-05-18 | End: 2022-12-28 | Stop reason: SDUPTHER

## 2022-05-18 RX ORDER — TRAZODONE HYDROCHLORIDE 100 MG/1
150 TABLET ORAL NIGHTLY
Qty: 135 TABLET | Refills: 0 | Status: SHIPPED | OUTPATIENT
Start: 2022-05-18 | End: 2022-10-11

## 2022-05-19 ENCOUNTER — TELEPHONE (OUTPATIENT)
Dept: PSYCHIATRY | Facility: CLINIC | Age: 57
End: 2022-05-19

## 2022-05-24 ENCOUNTER — TELEPHONE (OUTPATIENT)
Dept: PSYCHIATRY | Facility: CLINIC | Age: 57
End: 2022-05-24

## 2022-05-24 NOTE — TELEPHONE ENCOUNTER
Received Call from [employer] , stating Patients [employer] is still requiring a PA  ,  [employer]  states he has already called and verified with pharmacy.        I called [facility] and spoke with Perry , he re ran  Rex[employer] and states that it went through on patient insurance and will be ready for  today.      Left voicemail for patient to make aware she can  rexulti prescription today.

## 2022-05-27 ENCOUNTER — TELEMEDICINE (OUTPATIENT)
Dept: INTERNAL MEDICINE | Facility: CLINIC | Age: 57
End: 2022-05-27

## 2022-05-27 DIAGNOSIS — K21.9 GASTROESOPHAGEAL REFLUX DISEASE WITHOUT ESOPHAGITIS: ICD-10-CM

## 2022-05-27 DIAGNOSIS — R11.0 NAUSEA: ICD-10-CM

## 2022-05-27 DIAGNOSIS — R20.0 LEFT ARM NUMBNESS: ICD-10-CM

## 2022-05-27 DIAGNOSIS — R11.2 NAUSEA AND VOMITING, UNSPECIFIED VOMITING TYPE: ICD-10-CM

## 2022-05-27 DIAGNOSIS — M54.2 ACUTE NECK PAIN: ICD-10-CM

## 2022-05-27 DIAGNOSIS — M25.511 ACUTE PAIN OF RIGHT SHOULDER: Primary | ICD-10-CM

## 2022-05-27 DIAGNOSIS — F41.8 DEPRESSION WITH ANXIETY: ICD-10-CM

## 2022-05-27 PROCEDURE — 99214 OFFICE O/P EST MOD 30 MIN: CPT | Performed by: INTERNAL MEDICINE

## 2022-05-27 RX ORDER — ONDANSETRON 8 MG/1
TABLET, ORALLY DISINTEGRATING ORAL
Qty: 30 TABLET | Refills: 5 | Status: SHIPPED | OUTPATIENT
Start: 2022-05-27

## 2022-05-27 RX ORDER — OMEPRAZOLE 40 MG/1
40 CAPSULE, DELAYED RELEASE ORAL DAILY
Qty: 30 CAPSULE | Refills: 2 | Status: SHIPPED | OUTPATIENT
Start: 2022-05-27

## 2022-05-27 RX ORDER — DIAZEPAM 5 MG/1
5 TABLET ORAL EVERY 12 HOURS PRN
Qty: 60 TABLET | Refills: 2 | Status: SHIPPED | OUTPATIENT
Start: 2022-05-27 | End: 2022-09-12
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
## 2022-05-31 ENCOUNTER — PATIENT MESSAGE (OUTPATIENT)
Dept: INTERNAL MEDICINE | Facility: CLINIC | Age: 57
End: 2022-05-31

## 2022-05-31 ENCOUNTER — TELEPHONE (OUTPATIENT)
Dept: INTERNAL MEDICINE | Facility: CLINIC | Age: 57
End: 2022-05-31

## 2022-05-31 NOTE — TELEPHONE ENCOUNTER
Letter updated. [employer] message sent advising, also letting patient know our fax machine is down and we can't fax right now.
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
## 2022-06-05 DIAGNOSIS — E04.0 GOITER DIFFUSE, NONTOXIC: ICD-10-CM

## 2022-06-06 RX ORDER — LEVOTHYROXINE SODIUM 75 MCG
TABLET ORAL
Qty: 30 TABLET | Refills: 5 | Status: SHIPPED | OUTPATIENT
Start: 2022-06-06

## 2022-06-08 ENCOUNTER — TREATMENT (OUTPATIENT)
Dept: PHYSICAL THERAPY | Facility: CLINIC | Age: 57
End: 2022-06-08

## 2022-06-08 DIAGNOSIS — M54.2 PAIN, NECK: Primary | ICD-10-CM

## 2022-06-08 PROCEDURE — 97162 PT EVAL MOD COMPLEX 30 MIN: CPT | Performed by: PHYSICAL THERAPIST
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
## 2022-06-09 ENCOUNTER — TELEMEDICINE (OUTPATIENT)
Dept: PSYCHIATRY | Facility: CLINIC | Age: 57
End: 2022-06-09

## 2022-06-09 DIAGNOSIS — F43.10 POST TRAUMATIC STRESS DISORDER (PTSD): Primary | ICD-10-CM

## 2022-06-09 PROCEDURE — 90834 PSYTX W PT 45 MINUTES: CPT | Performed by: COUNSELOR
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
## 2022-06-10 ENCOUNTER — TELEPHONE (OUTPATIENT)
Dept: INTERNAL MEDICINE | Facility: CLINIC | Age: 57
End: 2022-06-10

## 2022-06-10 ENCOUNTER — PATIENT MESSAGE (OUTPATIENT)
Dept: INTERNAL MEDICINE | Facility: CLINIC | Age: 57
End: 2022-06-10

## 2022-06-10 NOTE — TELEPHONE ENCOUNTER
Can the pts back to work date be pushed until July 1st? She has a pinched nerve in her back and doing Physical therapy at the moment, please advise.

## 2022-06-15 ENCOUNTER — TREATMENT (OUTPATIENT)
Dept: PHYSICAL THERAPY | Facility: CLINIC | Age: 57
End: 2022-06-15

## 2022-06-15 DIAGNOSIS — M54.2 PAIN, NECK: Primary | ICD-10-CM

## 2022-06-15 PROCEDURE — 97140 MANUAL THERAPY 1/> REGIONS: CPT | Performed by: PHYSICAL THERAPIST

## 2022-06-15 PROCEDURE — 97110 THERAPEUTIC EXERCISES: CPT | Performed by: PHYSICAL THERAPIST

## 2022-06-15 NOTE — PROGRESS NOTES
Physical Therapy Daily Treatment Note      Patient: Vish Russo   : 1965  Referring practitioner: Argelia Barron*  Date of Initial Visit: Type: THERAPY  Noted: 2022  Today's Date: 6/15/2022  Patient seen for 2 sessions       Visit Diagnoses:    ICD-10-CM ICD-9-CM   1. Pain, neck  M54.2 723.1       Subjective   Patient reports they moved back return to work date to . States  her pain level is 6/10 upon arrival.  Radicular symptoms present on arrival into digits 1-3 on right arm/hand. Continued tinnitus in right ear. Headache noted when waking up daily.     Objective   See Exercise, Manual, and Modality Logs for complete treatment.       Assessment/Plan   TTP right cervical paraspinals, mild improvement with light cervical distraction. Taut and TTP right pec, UT, levator scap. Educated patient on STM techniques for home to decrease anterior HH position, improve scap retraction awareness. Limited exercises tolerated due to neck pain. Mild relief of radicular symptoms noted with supine position with RUE elevated/propped. Assess response from manual techniques. Could consider DN to UT, paraspinals next few visits, pending symptoms.       Timed:         Manual Therapy:    28     mins  74588;     Therapeutic Exercise:    10     mins  66561;     Neuromuscular Duglas:    0    mins  72077;    Therapeutic Activity:     0     mins  77326;     Gait Trainin     mins  28287;     Ultrasound:     0     mins  97534;    Ionto                               0    mins   40941  Self Care                       0     mins   77326  Canalith Repos    0     mins 53944      Un-Timed:  Electrical Stimulation:    0     mins  95395 ( );  Dry Needling     0     mins self-pay  Traction     0     mins 73753      Timed Treatment:   38   mins   Total Treatment:     38   mins    Corinne E. Perkins, PT  [place] License: [id number]

## 2022-06-17 ENCOUNTER — TREATMENT (OUTPATIENT)
Dept: PHYSICAL THERAPY | Facility: CLINIC | Age: 57
End: 2022-06-17

## 2022-06-17 DIAGNOSIS — M54.2 PAIN, NECK: Primary | ICD-10-CM

## 2022-06-17 PROCEDURE — 97110 THERAPEUTIC EXERCISES: CPT | Performed by: PHYSICAL THERAPIST

## 2022-06-17 PROCEDURE — 97140 MANUAL THERAPY 1/> REGIONS: CPT | Performed by: PHYSICAL THERAPIST

## 2022-06-17 NOTE — PROGRESS NOTES
Physical Therapy Daily Treatment Note      Patient: Vish Russo   : 1965  Referring practitioner: Argelia Barron*  Date of Initial Visit: Type: THERAPY  Noted: 2022  Today's Date: 2022  Patient seen for 3 sessions       Visit Diagnoses:    ICD-10-CM ICD-9-CM   1. Pain, neck  M54.2 723.1       Subjective   Patient reports she didn't sleep at all last night due to neck pain. States she has dull headache on arrival. States  her pain level is 7/10 upon arrival.      Objective   Taut right scalenes, UT, levator scap, SCM    See Exercise, Manual, and Modality Logs for complete treatment.       Assessment/Plan   TTP mid to upper cervical levels today with grade II-III AP, PA glides both. Decreased UT tension noted following treatment. Pt educated on UT and levator scap stretches to perform throughout today. Trial of DN performed to right UT. Assess response next visit.       Timed:         Manual Therapy:    28     mins  69010;     Therapeutic Exercise:    10     mins  72062;     Neuromuscular Duglas:    0    mins  41737;    Therapeutic Activity:     0     mins  24443;     Gait Trainin     mins  35915;     Ultrasound:     0     mins  64247;    Ionto                               0    mins   12555  Self Care                       0     mins   34539  Canalith Repos    0     mins 60289      Un-Timed:  Electrical Stimulation:    0     mins  37605 ( );  Dry Needling     1     mins self-pay trial  Traction     0     mins 06138      Timed Treatment:   38   mins   Total Treatment:     45   mins    Corinne E. Perkins, PT  [place] License: [id number]

## 2022-06-22 ENCOUNTER — TREATMENT (OUTPATIENT)
Dept: PHYSICAL THERAPY | Facility: CLINIC | Age: 57
End: 2022-06-22

## 2022-06-22 DIAGNOSIS — M54.2 PAIN, NECK: Primary | ICD-10-CM

## 2022-06-22 PROCEDURE — 97110 THERAPEUTIC EXERCISES: CPT | Performed by: PHYSICAL THERAPIST

## 2022-06-22 PROCEDURE — 97140 MANUAL THERAPY 1/> REGIONS: CPT | Performed by: PHYSICAL THERAPIST
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
## 2022-06-23 ENCOUNTER — TREATMENT (OUTPATIENT)
Dept: PHYSICAL THERAPY | Facility: CLINIC | Age: 57
End: 2022-06-23

## 2022-06-23 ENCOUNTER — TELEMEDICINE (OUTPATIENT)
Dept: PSYCHIATRY | Facility: CLINIC | Age: 57
End: 2022-06-23

## 2022-06-23 DIAGNOSIS — M54.2 PAIN, NECK: Primary | ICD-10-CM

## 2022-06-23 DIAGNOSIS — F43.10 POST TRAUMATIC STRESS DISORDER (PTSD): Primary | ICD-10-CM

## 2022-06-23 PROCEDURE — 97140 MANUAL THERAPY 1/> REGIONS: CPT | Performed by: PHYSICAL THERAPIST

## 2022-06-23 PROCEDURE — 90837 PSYTX W PT 60 MINUTES: CPT | Performed by: COUNSELOR

## 2022-06-23 PROCEDURE — 97110 THERAPEUTIC EXERCISES: CPT | Performed by: PHYSICAL THERAPIST

## 2022-06-23 NOTE — PROGRESS NOTES
Physical Therapy Daily Treatment Note      Patient: Vish Russo   : 1965  Referring practitioner: Argelia Barron*  Date of Initial Visit: Type: THERAPY  Noted: 2022  Today's Date: 2022  Patient seen for 5 sessions       Visit Diagnoses:    ICD-10-CM ICD-9-CM   1. Pain, neck  M54.2 723.1       Subjective   Patient reports she had about 4 hours of relief after last visit, but then pain returned and soreness. States  her pain level is 5/10 upon arrival.  States she did take a muscle relaxer due to increased pain this morning, prior to medication, pain rated about 7-8/10.     Objective   See Exercise, Manual, and Modality Logs for complete treatment.       Assessment/Plan   Pt demonstrates significant right shoulder AROM limitation. Pain noted in anterior GHJ, along supraspinatus and medial scapular border. She continues to have increased muscular guarding of UT, levator scap, pec, and right SCM. Reviewed HEP including aquatic options for improved shoulder ROM, scapular mobility, and cervical mobility. If lack of further improvement persists next week, anticipate referral back to PCP to consider ortho referral to assess right shoulder.       Timed:         Manual Therapy:    23     mins  77165;     Therapeutic Exercise:    20     mins  10407;     Neuromuscular Duglas:    0    mins  31582;    Therapeutic Activity:     0     mins  14914;     Gait Trainin     mins  31955;     Ultrasound:     0     mins  41405;    Ionto                               0    mins   87366  Self Care                       0     mins   34559  Canalith Repos    0     mins 62739      Un-Timed:  Electrical Stimulation:    0     mins  37422 ( );  Dry Needling     0     mins self-pay  Traction     0     mins 18004      Timed Treatment:   43   mins   Total Treatment:     43   mins    Corinne E. Perkins, PT  [place] License: [id number]

## 2022-06-29 ENCOUNTER — TREATMENT (OUTPATIENT)
Dept: PHYSICAL THERAPY | Facility: CLINIC | Age: 57
End: 2022-06-29

## 2022-06-29 DIAGNOSIS — M54.2 PAIN, NECK: Primary | ICD-10-CM

## 2022-06-29 PROCEDURE — 97110 THERAPEUTIC EXERCISES: CPT | Performed by: PHYSICAL THERAPIST

## 2022-06-29 NOTE — PROGRESS NOTES
Physical Therapy Daily Treatment Note      Patient: Vish Russo   : 1965  Referring practitioner: Argelia Barron*  Date of Initial Visit: Type: THERAPY  Noted: 2022  Today's Date: 2022  Patient seen for 6 sessions       Visit Diagnoses:    ICD-10-CM ICD-9-CM   1. Pain, neck  M54.2 723.1       Subjective   Patient reports she is frustrated with her pain overall and lack of improvement. States her right 1-3 digits are tingling and have pain  Intermittently again. States  her pain level is 5-6/10 upon arrival, despite taking baclofen around 8:30AM. States she was able to swim the other day, felt okay on arms/shoulders and plans to swim again today. Pt has f/u with PCP this Friday.     Objective   See Exercise, Manual, and Modality Logs for complete treatment.       Assessment/Plan   Patient demonstrates increased right shoulder AAROM standing at wall , flexion 154 degrees with noted pain. She tolerated about 90 deg flexion and scaption both at pulley AAROM seated position. Due to lack of further improvement overall in neck pain and shoulder pain, discussed option of ortho referral for right shoulder pain. Pt would like to return to work full time; however reports she is physically unable to secondary to significant pain and limited mobility at this time. Reviewed median nerve glides OKC and CKC positions both along with wall slides for AAROM shoulder mobility.       Timed:         Manual Therapy:    0     mins  28015;     Therapeutic Exercise:    30     mins  51908;     Neuromuscular Duglas:    0    mins  41693;    Therapeutic Activity:     0     mins  08387;     Gait Trainin     mins  33267;     Ultrasound:     0     mins  48612;    Ionto                               0    mins   57794  Self Care                       0     mins   17205  Canalith Repos    0     mins 15571      Un-Timed:  Electrical Stimulation:    0     mins  75032 ( );  Dry Needling     0     mins  self-pay  Traction     0     mins 00054      Timed Treatment:   30   mins   Total Treatment:     30   mins    Corinne E. Perkins, PT  [place] License: [id number]

## 2022-07-01 ENCOUNTER — TELEPHONE (OUTPATIENT)
Dept: PHYSICAL THERAPY | Facility: CLINIC | Age: 57
End: 2022-07-01

## 2022-07-01 ENCOUNTER — TELEMEDICINE (OUTPATIENT)
Dept: INTERNAL MEDICINE | Facility: CLINIC | Age: 57
End: 2022-07-01

## 2022-07-01 ENCOUNTER — TELEPHONE (OUTPATIENT)
Dept: INTERNAL MEDICINE | Facility: CLINIC | Age: 57
End: 2022-07-01

## 2022-07-01 DIAGNOSIS — M25.511 ACUTE PAIN OF RIGHT SHOULDER: Primary | ICD-10-CM

## 2022-07-01 DIAGNOSIS — M54.2 ACUTE NECK PAIN: ICD-10-CM

## 2022-07-01 PROCEDURE — 99213 OFFICE O/P EST LOW 20 MIN: CPT | Performed by: INTERNAL MEDICINE
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
## 2022-07-01 NOTE — PROGRESS NOTES
Mode of Visit: Video  Location of patient: home  You have chosen to receive care through a telehealth visit.  The patient has signed the video visit consent form.  The visit included audio and video interaction. No technical issues occurred during this visit. Done via [email or web address]    Subjective   Vish Russo is a 57 y.o. female here for follow-up acute shoulder and neck pain. She isn't making any progress with PT, has been many times at this point. Still with reduced ROM and dull pain. Cannot do her job as a . No new qualities to the pain.      I have reviewed the following portions of the patient's history and confirmed they are accurate: current medications, past medical history, past social history and problem list     I have personally reviewed and performed the ROS. Argelia Mckeon MD     Review of Systems:  General: negative  Neuro: negative  MSK: see hpi    Objective   There were no vitals taken for this visit.  No VS done due to tele visit    Physical Exam  Vitals reviewed.   Constitutional:       Appearance: She is well-developed.   Pulmonary:      Effort: Pulmonary effort is normal.   Neurological:      Mental Status: She is alert and oriented to person, place, and time.   Psychiatric:         Behavior: Behavior normal.         Thought Content: Thought content normal.         Judgment: Judgment normal.         Assessment & Plan   Diagnoses and all orders for this visit:    1. Acute pain of right shoulder (Primary)  -     Ambulatory Referral to Orthopedic Surgery  -extend work leave    2. Acute neck pain  -continue NSAIDs and baclofen SAIDA Mckeon MD

## 2022-07-05 ENCOUNTER — TELEPHONE (OUTPATIENT)
Dept: INTERNAL MEDICINE | Facility: CLINIC | Age: 57
End: 2022-07-05
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
## 2022-07-06 ENCOUNTER — TREATMENT (OUTPATIENT)
Dept: PHYSICAL THERAPY | Facility: CLINIC | Age: 57
End: 2022-07-06

## 2022-07-06 DIAGNOSIS — M54.2 PAIN, NECK: Primary | ICD-10-CM

## 2022-07-06 PROCEDURE — 97110 THERAPEUTIC EXERCISES: CPT | Performed by: PHYSICAL THERAPIST

## 2022-07-06 PROCEDURE — 97530 THERAPEUTIC ACTIVITIES: CPT | Performed by: PHYSICAL THERAPIST

## 2022-07-06 NOTE — PROGRESS NOTES
Physical Therapy Daily Treatment Note      Patient: Vish Russo   : 1965  Referring practitioner: Argelia Barron*  Date of Initial Visit: Type: THERAPY  Noted: 2022  Today's Date: 2022  Patient seen for 7 sessions       Visit Diagnoses:    ICD-10-CM ICD-9-CM   1. Pain, neck  M54.2 723.1       Subjective   Patient reports she was vomitting this past weekend, reports a side effect from a medication, noted severe shooting pain into back of head and tingling in both arms and into both hands. States her forearms were tense and had electric shock pain intermittently. States states that concerned her, especially since she could feel increased neck pain and radicular symptoms with active flexion. States her pain level is 6-7/10 upon arrival.      Objective   See Exercise, Manual, and Modality Logs for complete treatment.       Assessment/Plan   Patient had f/u with her PCP last week, reports she is off work until  and discussed option of ortho referral for shoulder pain; however with recent reports of increased radicular symptoms into BUE, tingling/nerve pain into both hands, unable to r/o further cervical involvement in right shoulder restriction as well. She denies improvement with therapy so far, ,reports her pain has stayed the same despite manual or DN techniques. Held further progression of exercises in clinic. Reviewed current HEP, both land based and aquatic options. Encouraged patient to f/u with PCP if bilateral UE radicular symptoms persists. Follow up with patient in 2 weeks.        Timed:         Manual Therapy:    0     mins  63060;     Therapeutic Exercise:    18     mins  28975;     Neuromuscular Duglas:    0    mins  46908;    Therapeutic Activity:     10     mins  89195;     Gait Trainin     mins  22890;     Ultrasound:     0     mins  45995;    Ionto                               0    mins   12576  Self Care                       0     mins   29094  Stevie  Repos    0     mins 13636      Un-Timed:  Electrical Stimulation:    0     mins  78957 ( );  Dry Needling     0     mins self-pay  Traction     0     mins 29164      Timed Treatment:   28   mins   Total Treatment:     28   mins    Corinne E. Perkins, PT  [place] License: [id number]

## 2022-07-07 ENCOUNTER — TELEMEDICINE (OUTPATIENT)
Dept: PSYCHIATRY | Facility: CLINIC | Age: 57
End: 2022-07-07

## 2022-07-07 DIAGNOSIS — J30.89 PERENNIAL ALLERGIC RHINITIS: ICD-10-CM

## 2022-07-07 DIAGNOSIS — F43.10 POST TRAUMATIC STRESS DISORDER (PTSD): Primary | ICD-10-CM

## 2022-07-07 PROCEDURE — 90837 PSYTX W PT 60 MINUTES: CPT | Performed by: COUNSELOR

## 2022-07-07 RX ORDER — FLUTICASONE PROPIONATE 50 MCG
SPRAY, SUSPENSION (ML) NASAL
Qty: 48 ML | Refills: 1 | Status: SHIPPED | OUTPATIENT
Start: 2022-07-07
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
## 2022-07-08 ENCOUNTER — OFFICE VISIT (OUTPATIENT)
Dept: ORTHOPEDIC SURGERY | Facility: CLINIC | Age: 57
End: 2022-07-08

## 2022-07-08 ENCOUNTER — TELEPHONE (OUTPATIENT)
Dept: INTERNAL MEDICINE | Facility: CLINIC | Age: 57
End: 2022-07-08

## 2022-07-08 VITALS
BODY MASS INDEX: 32.49 KG/M2 | SYSTOLIC BLOOD PRESSURE: 130 MMHG | WEIGHT: 195 LBS | HEIGHT: 65 IN | DIASTOLIC BLOOD PRESSURE: 92 MMHG

## 2022-07-08 DIAGNOSIS — M75.41 IMPINGEMENT SYNDROME OF RIGHT SHOULDER: ICD-10-CM

## 2022-07-08 DIAGNOSIS — G89.29 CHRONIC NECK PAIN: Primary | ICD-10-CM

## 2022-07-08 DIAGNOSIS — M25.511 RIGHT SHOULDER PAIN, UNSPECIFIED CHRONICITY: ICD-10-CM

## 2022-07-08 DIAGNOSIS — M54.2 ACUTE NECK PAIN: Primary | ICD-10-CM

## 2022-07-08 DIAGNOSIS — M75.51 BURSITIS OF RIGHT SHOULDER: ICD-10-CM

## 2022-07-08 DIAGNOSIS — M54.2 CHRONIC NECK PAIN: Primary | ICD-10-CM

## 2022-07-08 DIAGNOSIS — M75.21 BICEPS TENDINITIS OF RIGHT UPPER EXTREMITY: Primary | ICD-10-CM

## 2022-07-08 PROCEDURE — 99204 OFFICE O/P NEW MOD 45 MIN: CPT | Performed by: ORTHOPAEDIC SURGERY

## 2022-07-08 NOTE — TELEPHONE ENCOUNTER
I ordered the PT. The MRI I feel should come from ortho. MRI of spine from primary care generally isn't approved, especially when I don't have any physical exam to back it up. I haven't physically examined her neck. And ok to write a work excuse for return to work TBD, actively seeing ortho and getting therapy/medications.
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
## 2022-07-08 NOTE — TELEPHONE ENCOUNTER
She says she has been seeing ortho for her shoulder but they want her to see someone for her neck. That's the referral she needs. She has also already been seeing PT

## 2022-07-20 ENCOUNTER — TELEPHONE (OUTPATIENT)
Dept: PHYSICAL THERAPY | Facility: CLINIC | Age: 57
End: 2022-07-20

## 2022-07-22 ENCOUNTER — TELEPHONE (OUTPATIENT)
Dept: PHYSICAL THERAPY | Facility: CLINIC | Age: 57
End: 2022-07-22

## 2022-07-26 ENCOUNTER — TELEPHONE (OUTPATIENT)
Dept: INTERNAL MEDICINE | Facility: CLINIC | Age: 57
End: 2022-07-26

## 2022-07-26 NOTE — TELEPHONE ENCOUNTER
Spoke with her. She says she has lots of congestion, fatigue, coughing and gets winded easier than normal. She didn't want to go to urgent care. She wanted at home testing sent to her pharmacy. I sent two for her. She is asking if you will send a cough syrup for her that she won't have a co-pay for. She says she is out of money and can't afford otc meds.

## 2022-07-26 NOTE — TELEPHONE ENCOUNTER
I sent in a cough syrup but I have no idea on if she will have to pay a copay. That may be a q she can ask her pharmacy.

## 2022-07-27 ENCOUNTER — TREATMENT (OUTPATIENT)
Dept: PHYSICAL THERAPY | Facility: CLINIC | Age: 57
End: 2022-07-27

## 2022-07-27 DIAGNOSIS — M54.2 PAIN, NECK: Primary | ICD-10-CM

## 2022-07-27 PROCEDURE — 97110 THERAPEUTIC EXERCISES: CPT | Performed by: PHYSICAL THERAPIST
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
## 2022-07-31 ENCOUNTER — HOSPITAL ENCOUNTER (OUTPATIENT)
Dept: MRI IMAGING | Facility: HOSPITAL | Age: 57
Discharge: HOME | End: 2022-07-31
Admitting: ORTHOPAEDIC SURGERY

## 2022-07-31 DIAGNOSIS — M75.41 IMPINGEMENT SYNDROME OF RIGHT SHOULDER: ICD-10-CM

## 2022-07-31 DIAGNOSIS — M75.51 BURSITIS OF RIGHT SHOULDER: ICD-10-CM

## 2022-07-31 DIAGNOSIS — M75.21 BICEPS TENDINITIS OF RIGHT UPPER EXTREMITY: ICD-10-CM

## 2022-07-31 DIAGNOSIS — M25.511 RIGHT SHOULDER PAIN, UNSPECIFIED CHRONICITY: ICD-10-CM

## 2022-07-31 PROCEDURE — 73221 MRI JOINT UPR EXTREM W/O DYE: CPT
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
## 2022-08-12 ENCOUNTER — OFFICE VISIT (OUTPATIENT)
Dept: ORTHOPEDIC SURGERY | Facility: CLINIC | Age: 57
End: 2022-08-12

## 2022-08-12 VITALS
BODY MASS INDEX: 32.15 KG/M2 | SYSTOLIC BLOOD PRESSURE: 140 MMHG | DIASTOLIC BLOOD PRESSURE: 70 MMHG | WEIGHT: 193 LBS | HEIGHT: 65 IN

## 2022-08-12 DIAGNOSIS — M75.51 BURSITIS OF RIGHT SHOULDER: ICD-10-CM

## 2022-08-12 DIAGNOSIS — M75.21 BICEPS TENDINITIS OF RIGHT UPPER EXTREMITY: ICD-10-CM

## 2022-08-12 DIAGNOSIS — S46.011A TRAUMATIC COMPLETE TEAR OF RIGHT ROTATOR CUFF, INITIAL ENCOUNTER: Primary | ICD-10-CM

## 2022-08-12 DIAGNOSIS — M25.511 RIGHT SHOULDER PAIN, UNSPECIFIED CHRONICITY: ICD-10-CM

## 2022-08-12 DIAGNOSIS — M75.41 IMPINGEMENT SYNDROME OF RIGHT SHOULDER: ICD-10-CM

## 2022-08-12 PROCEDURE — 99214 OFFICE O/P EST MOD 30 MIN: CPT | Performed by: ORTHOPAEDIC SURGERY
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
## 2022-08-24 DIAGNOSIS — S46.011A TRAUMATIC COMPLETE TEAR OF RIGHT ROTATOR CUFF, INITIAL ENCOUNTER: Primary | ICD-10-CM

## 2022-08-29 ENCOUNTER — TELEPHONE (OUTPATIENT)
Dept: PSYCHIATRY | Facility: CLINIC | Age: 57
End: 2022-08-29

## 2022-08-29 NOTE — TELEPHONE ENCOUNTER
"Patient called , stating she \"is coming out of her skin\" she has anxiety and concern over an upcoming job interview that she simply must be hired or it will ruin her life. She is in such bad shape over everything that she \"has suicidal thoughts, but does not have suicidal thoughts\" when I asked her to explain she said that she has thought of ways to kill herself such as drive her car off a bridge or slice her wrist but she dosent want to hurt her kids in that way. However she \"does not want to be here anymore\" The patient was able to identify hope for the future including her kids plus the possibilty of a new job.  Patient did say that her friend of 30 years was with her and the friend is actually her roommate. I asked to speak to the friend whose name is Alma and Alma said that she would accompany the patient to [facility] and patient said that they will call us back at the office and let us know that they have arrived and keep us updated on her status.     (Gini Jorge is out of office this week)     Please Advise if there is anything more that I can do?     Thank You  "

## 2022-08-30 ENCOUNTER — TELEMEDICINE (OUTPATIENT)
Dept: PSYCHIATRY | Facility: CLINIC | Age: 57
End: 2022-08-30

## 2022-08-30 DIAGNOSIS — F43.10 POST TRAUMATIC STRESS DISORDER (PTSD): Primary | ICD-10-CM

## 2022-08-30 PROCEDURE — 90832 PSYTX W PT 30 MINUTES: CPT | Performed by: COUNSELOR

## 2022-09-06 ENCOUNTER — LAB (OUTPATIENT)
Dept: LAB | Facility: HOSPITAL | Age: 57
End: 2022-09-06

## 2022-09-06 DIAGNOSIS — S46.011A TRAUMATIC COMPLETE TEAR OF RIGHT ROTATOR CUFF, INITIAL ENCOUNTER: ICD-10-CM

## 2022-09-06 PROCEDURE — G0480 DRUG TEST DEF 1-7 CLASSES: HCPCS

## 2022-09-06 PROCEDURE — 36415 COLL VENOUS BLD VENIPUNCTURE: CPT

## 2022-09-07 ENCOUNTER — TELEMEDICINE (OUTPATIENT)
Dept: PSYCHIATRY | Facility: CLINIC | Age: 57
End: 2022-09-07

## 2022-09-07 DIAGNOSIS — F43.10 POST TRAUMATIC STRESS DISORDER (PTSD): Primary | ICD-10-CM

## 2022-09-07 PROCEDURE — 90837 PSYTX W PT 60 MINUTES: CPT | Performed by: COUNSELOR
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
## 2022-09-09 DIAGNOSIS — F41.8 DEPRESSION WITH ANXIETY: ICD-10-CM

## 2022-09-12 LAB
COTININE SERPL-MCNC: 25 NG/ML
NICOTINE SERPL-MCNC: <1 NG/ML

## 2022-09-12 RX ORDER — DIAZEPAM 5 MG/1
TABLET ORAL
Qty: 60 TABLET | Refills: 1 | Status: SHIPPED | OUTPATIENT
Start: 2022-09-12 | End: 2022-12-07

## 2022-09-21 ENCOUNTER — TELEMEDICINE (OUTPATIENT)
Dept: PSYCHIATRY | Facility: CLINIC | Age: 57
End: 2022-09-21

## 2022-09-21 DIAGNOSIS — F43.10 POST TRAUMATIC STRESS DISORDER (PTSD): Primary | ICD-10-CM

## 2022-09-21 DIAGNOSIS — F41.1 GENERALIZED ANXIETY DISORDER: ICD-10-CM

## 2022-09-21 PROCEDURE — 90834 PSYTX W PT 45 MINUTES: CPT | Performed by: COUNSELOR

## 2022-09-21 NOTE — PROGRESS NOTES
Date: September 22, 2022  Time In: 8:37 AM   Time Out: 9:20 AM   This provider is located at the [facility]), [address], [place] [address] using a secure Sent[employer]hart Video Visit through [employer]. Patient is being seen remotely via telehealth at home address in [place] and stated they are in a secure environment for this session. The patient's condition being diagnosed/treated is appropriate for telemedicine. The provider identified herself as well as her credentials. The patient, and/or patients guardian, consent to be seen remotely, and when consent is given they understand that the consent allows for patient identifiable information to be sent to a third party as needed. They may refuse to be seen remotely at any time. The electronic data is encrypted and password protected, and the patient and/or guardian has been advised of the potential risks to privacy not withstanding such measures.     You have chosen to receive care through a telehealth visit.  Do you consent to use a video/audio connection for your medical care today? Yes    PROGRESS NOTE  Data:  Vish Russo is a 57 y.o. female who presents today for follow up. Patient states that she has battled pain from a migraine and her shoulder pain for most of the night and she is still tired. Patient states that she will be starting her job at  on October 3rd. Patient states that she wasn't able to have her surgery due to not having her labs back so her surgery was pushed back until November 7th. 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 Patient has also had issues with clients in the past where she was triggered by their stories and the fact that she has constantly stood in front of a mirror and had time to  herself while she is working; discussed getting out of the her own head and talking to herself in a positive manner. Discussed on-going financial issues and how the patient has been working through things as best as she can and has survived thus far.     Chief Complaint: vomiting, diarrhea, feelings of depression and anxiety    History of Present Illness: patient has battled symptoms of PTSD for several years      Clinical Maneuvering/Intervention:  CBT    (Scales based on 0 - 10 with 10 being the worst)  Depression: 7-8 Anxiety: 10       Assisted patient in processing above session content; acknowledged and normalized patient’s thoughts, feelings, and concerns.  Rationalized patient thought process regarding starting her new job and financial .  Discussed triggers associated with patient's feelings of anxiety and depression such as financial worries, preparing to start the new job, and her own judgement of herself.  Also discussed coping skills for patient to implement such as use of positive self talk, self care, and continuing to maintain boundaries with others.    Allowed patient to freely discuss issues without interruption or judgment. Provided safe, confidential environment to facilitate the development of positive therapeutic relationship and encourage open, honest communication. Assisted patient in identifying risk factors which would indicate the need for higher level of care including thoughts to harm self or others and/or self-harming behavior and encouraged patient to contact this office, call 911, or present to the nearest emergency room should any of these events occur. Discussed crisis intervention services and means to access. Patient adamantly and convincingly denies current suicidal or homicidal  ideation or perceptual disturbance.    Assessment:   Assessment   Patient appears to maintain relative stability as compared to their baseline.  However, patient continues to struggle with symptoms of PTSD and anxiety which continues to cause impairment in important areas of functioning.  A result, they can be reasonably expected to continue to benefit from treatment and would likely be at increased risk for decompensation otherwise.    Mental Status Exam:   Hygiene:   good  Cooperation:  Cooperative  Eye Contact:  Good  Psychomotor Behavior:  Appropriate  Affect:  Appropriate  Mood: fluctates  Speech:  Normal  Thought Process:  Goal directed  Thought Content:  Normal  Suicidal:  None  Homicidal:  None  Hallucinations:  None  Delusion:  None  Memory:  Intact  Orientation:  Person, Place, Time and Situation  Reliability:  good  Insight:  Good  Judgement:  Good  Impulse Control:  Good  Physical/Medical Issues:  none new       Patient's Support Network Includes:  extended family and friends    Functional Status: Moderate impairment     Progress toward goal: Not at goal    Prognosis: Good with Ongoing Treatment          Plan:    Patient will continue in individual outpatient therapy with focus on improved functioning and coping skills, maintaining stability, and avoiding decompensation and the need for higher level of care.    Patient will adhere to medication regimen as prescribed and report any side effects. Patient will contact this office, call 911 or present to the nearest emergency room should suicidal or homicidal ideations occur. Provide Cognitive Behavioral Therapy and Solution Focused Therapy to improve functioning, maintain stability, and avoid decompensation and the need for higher level of care.     Return in about 4 weeks, or earlier if symptoms worsen or fail to improve.           VISIT DIAGNOSIS:     ICD-10-CM ICD-9-CM   1. Post traumatic stress disorder (PTSD)  F43.10 309.81   2. Generalized anxiety  disorder  F41.1 300.02             This document has been electronically signed by ERICK Dillard  September 22, 2022 12:50 EDT      Part of this note may be an electronic transcription/translation of spoken language to printed text using the Dragon Dictation System.

## 2022-09-26 ENCOUNTER — TELEPHONE (OUTPATIENT)
Dept: INTERNAL MEDICINE | Facility: CLINIC | Age: 57
End: 2022-09-26

## 2022-09-26 NOTE — TELEPHONE ENCOUNTER
Caller: Vish Russo    Relationship to patient: Self    Best call back number: [phone]     Chief complaint: TALK ABOUT MEDICATIONS    Type of visit: OFFICE    Requested date: THIS WEEK    Additional notes: PATIENT IS CALLING TO STATE THAT SHE WANTS AN APPOINTMENT WITH DR PHILLIPS ONLY TO DISCUSS HER MEDICATIONS.  HER FIRST AVAILABLE WAS 10/19/22.

## 2022-09-27 ENCOUNTER — TELEPHONE (OUTPATIENT)
Dept: INTERNAL MEDICINE | Facility: CLINIC | Age: 57
End: 2022-09-27

## 2022-10-11 ENCOUNTER — TELEMEDICINE (OUTPATIENT)
Dept: INTERNAL MEDICINE | Facility: CLINIC | Age: 57
End: 2022-10-11

## 2022-10-11 DIAGNOSIS — E66.9 CLASS 1 OBESITY WITH SERIOUS COMORBIDITY AND BODY MASS INDEX (BMI) OF 33.0 TO 33.9 IN ADULT, UNSPECIFIED OBESITY TYPE: Primary | ICD-10-CM

## 2022-10-11 DIAGNOSIS — F51.01 PRIMARY INSOMNIA: ICD-10-CM

## 2022-10-11 PROCEDURE — 99214 OFFICE O/P EST MOD 30 MIN: CPT | Performed by: INTERNAL MEDICINE

## 2022-10-11 RX ORDER — DOXEPIN HYDROCHLORIDE 3 MG/1
3-6 TABLET ORAL
Qty: 10 TABLET | Refills: 0 | Status: SHIPPED | OUTPATIENT
Start: 2022-10-11 | End: 2022-12-28

## 2022-10-11 RX ORDER — SEMAGLUTIDE 0.25 MG/.5ML
0.25 INJECTION, SOLUTION SUBCUTANEOUS WEEKLY
Qty: 2 ML | Refills: 0 | Status: SHIPPED | OUTPATIENT
Start: 2022-10-11

## 2022-10-11 NOTE — PROGRESS NOTES
Mode of Visit: Video  Location of patient: other: Work in [place] ()  You have chosen to receive care through a telehealth visit.  The patient has signed the video visit consent form.  The visit included audio and video interaction. No technical issues occurred during this visit.     Subjective   Vish Russo is a 57 y.o. female here for follow-up insomnia and weight.  She has just started a new job at [employer] and is enjoying that.  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  She is concerned about her weight, has tried and failed to lose weight repeatedly.  She is interested in Wegovy.  Also has not been sleeping well, feels like the trazodone does not work at all.  Would like to try something different.  Mood has been pretty good.    I have reviewed the following portions of the patient's history and confirmed they are accurate: current medications, past medical history, past social history and problem list     I have personally reviewed and performed the ROS. Argelia Mckeon MD     Review of Systems:  General: negative  Psych: Insomnia  Endo: Difficulty losing weight    Objective   There were no vitals taken for this visit.    Physical Exam  Vitals reviewed.   Constitutional:       Appearance: She is well-developed.   Pulmonary:      Effort: Pulmonary effort is normal.   Neurological:      Mental Status: She is alert and oriented to person, place, and time.   Psychiatric:         Behavior: Behavior normal.         Thought Content: Thought content normal.         Judgment: Judgment normal.         Assessment & Plan   Diagnoses and all orders for this visit:    1. Class 1 obesity with serious comorbidity and body mass index (BMI) of 33.0 to 33.9 in adult, unspecified obesity type (Primary)  -     Semaglutide-Weight Management (Wegovy) 0.25 MG/0.5ML solution auto-injector; Inject 0.25 mg under the skin into the appropriate area as directed 1 (One) Time Per Week.  Dispense: 2 mL;  Refill: 0  BMI is >= 30 and <35. (Class 1 Obesity). The following options were offered after discussion;: nutrition counseling/recommendations and pharmacological intervention options      2. Primary insomnia  -     Doxepin HCl 3 MG tablet; Take 3-6 mg by mouth every night at bedtime.  Dispense: 10 tablet; Refill: 0             Argelia Mckeon MD

## 2022-10-18 ENCOUNTER — PRIOR AUTHORIZATION (OUTPATIENT)
Dept: INTERNAL MEDICINE | Facility: CLINIC | Age: 57
End: 2022-10-18

## 2022-10-21 ENCOUNTER — PRIOR AUTHORIZATION (OUTPATIENT)
Dept: INTERNAL MEDICINE | Facility: CLINIC | Age: 57
End: 2022-10-21

## 2022-10-24 ENCOUNTER — PRIOR AUTHORIZATION (OUTPATIENT)
Dept: INTERNAL MEDICINE | Facility: CLINIC | Age: 57
End: 2022-10-24

## 2022-11-09 ENCOUNTER — TELEPHONE (OUTPATIENT)
Dept: ORTHOPEDIC SURGERY | Facility: CLINIC | Age: 57
End: 2022-11-09

## 2022-11-09 NOTE — TELEPHONE ENCOUNTER
SURGERY SCHEDULING CALLED PATIENT AND GOT HER RESCHEDULED FOR 1/23/2023 . SENT PAPERWORK TO PATIENT HOME ADDRESS AND E-MAILED PAPERWORK TO EMAIL ON FILE ON 11/9/2022 .

## 2022-11-10 ENCOUNTER — TELEMEDICINE (OUTPATIENT)
Dept: PSYCHIATRY | Facility: CLINIC | Age: 57
End: 2022-11-10

## 2022-11-10 ENCOUNTER — TELEPHONE (OUTPATIENT)
Dept: PSYCHIATRY | Facility: CLINIC | Age: 57
End: 2022-11-10

## 2022-11-10 ENCOUNTER — TELEPHONE (OUTPATIENT)
Dept: ORTHOPEDIC SURGERY | Facility: CLINIC | Age: 57
End: 2022-11-10

## 2022-11-10 DIAGNOSIS — F43.10 POST TRAUMATIC STRESS DISORDER (PTSD): Primary | ICD-10-CM

## 2022-11-10 DIAGNOSIS — F33.1 MODERATE EPISODE OF RECURRENT MAJOR DEPRESSIVE DISORDER: ICD-10-CM

## 2022-11-10 PROCEDURE — 90837 PSYTX W PT 60 MINUTES: CPT | Performed by: COUNSELOR

## 2022-11-10 NOTE — PROGRESS NOTES
Date: November 25, 2022  Time In: 3:49 PM  Time Out: 4:43 PM  This provider is located at [facility] [address], [place] [address] using a secure [employer] Video Visit through [id number]. Patient is being seen remotely via telehealth at home address in [place] and stated they are in a secure environment for this session. The patient's condition being diagnosed/treated is appropriate for telemedicine. The provider identified herself as well as her credentials. The patient, and/or patients guardian, consent to be seen remotely, and when consent is given they understand that the consent allows for patient identifiable information to be sent to a third party as needed. They may refuse to be seen remotely at any time. The electronic data is encrypted and password protected, and the patient and/or guardian has been advised of the potential risks to privacy not withstanding such measures.     You have chosen to receive care through a telehealth visit.  Do you consent to use a video/audio connection for your medical care today? Yes    PROGRESS NOTE  Data:  Vish Russo is a 57 y.o. female who presents today for follow up. Patient states that she had caught on really well at her new job and likes it. Patient states that she cannot take time off for her surgery until January 23rd. 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 Patient states that her mother is still the same and her father is dying as his oxygen saturation continues to drop and he was put on oxygen for severe COPD and he continues to smoke and has declined with his personal care. Patient states that her sister is living with her parents and that is hard for her as she is in recovery and is back in the same place where she was using and her  mother is not the most supportive of her sister's needs. Patient states that the man in her life has been ghosting her but will then pop back up and feels as though something isn't right with him. Patient states that she tries to give everyone in her life the benefit of the doubt but struggles to give herself the same courtney that she gives others. Patient states that she has struggled with the stomach virus this past week but is starting to feel better now. Patient states that due to the issues with her insurance and not being able to get her lawsuit settled that she will still have to file bankruptcy but she at least has some income coming in now. Patient states that she is looking forward to having her surgery and getting everything wrapped up.     Chief Complaint: bad dreams, negative thoughts of the past, feelings of depression and anxiety, financial worries      History of Present Illness: Patient has struggled with symptoms of anxiety, PTSD and depression for several years      Clinical Maneuvering/Intervention:  CBT    (Scales based on 0 - 10 with 10 being the worst)  Depression: 5-6 Anxiety: 8       Assisted patient in processing above session content; acknowledged and normalized patient’s thoughts, feelings, and concerns.  Rationalized patient thought process regarding issues with family, finances and work.  Discussed triggers associated with patient's feelings of depression and anxiety such as continuing financial worries, preparing for surgery, and family issues .  Also discussed coping skills for patient to implement such as maintaining her boundaries with others, working on creating a budget for herself that is reasonable in regard to her income pre and post surgery and using thought stopping techniques for when the patient begins to focus on the past.    Allowed patient to freely discuss issues without interruption or judgment. Provided safe, confidential environment to facilitate the development of  positive therapeutic relationship and encourage open, honest communication. Assisted patient in identifying risk factors which would indicate the need for higher level of care including thoughts to harm self or others and/or self-harming behavior and encouraged patient to contact this office, call 911, or present to the nearest emergency room should any of these events occur. Discussed crisis intervention services and means to access. Patient adamantly and convincingly denies current suicidal or homicidal ideation or perceptual disturbance.    Assessment:   Assessment   Patient appears to maintain relative stability as compared to their baseline.  However, patient continues to struggle with feelings of depression, anxiety and PTSD which continues to cause impairment in important areas of functioning.  A result, they can be reasonably expected to continue to benefit from treatment and would likely be at increased risk for decompensation otherwise.    Mental Status Exam:   Hygiene:   good  Cooperation:  Cooperative  Eye Contact:  Good  Psychomotor Behavior:  Appropriate  Affect:  Appropriate  Mood: fluctates and tired  Speech:  Normal  Thought Process:  Goal directed  Thought Content:  Normal  Suicidal:  None  Homicidal:  None  Hallucinations:  None  Delusion:  None  Memory:  Deficits  Orientation:  Person, Place, Time and Situation  Reliability:  good  Insight:  Good  Judgement:  Good  Impulse Control:  Good  Physical/Medical Issues:  Yes preparing for shoulder surgery       Patient's Support Network Includes:  children, extended family and friends    Functional Status: Moderate impairment     Progress toward goal: Not at goal    Prognosis: Good with Ongoing Treatment          Plan:    Patient will continue in individual outpatient therapy with focus on improved functioning and coping skills, maintaining stability, and avoiding decompensation and the need for higher level of care.    Patient will adhere to medication  regimen as prescribed and report any side effects. Patient will contact this office, call 911 or present to the nearest emergency room should suicidal or homicidal ideations occur. Provide Cognitive Behavioral Therapy and Solution Focused Therapy to improve functioning, maintain stability, and avoid decompensation and the need for higher level of care.     Return in about 2 weeks, or earlier if symptoms worsen or fail to improve.           VISIT DIAGNOSIS:     ICD-10-CM ICD-9-CM   1. Post traumatic stress disorder (PTSD)  F43.10 309.81   2. Moderate episode of recurrent major depressive disorder (HCC)  F33.1 296.32             This document has been electronically signed by ERICK Dillard  November 25, 2022 19:43 EST      Part of this note may be an electronic transcription/translation of spoken language to printed text using the Dragon Dictation System.

## 2022-11-10 NOTE — TELEPHONE ENCOUNTER
Attempted to call pt back. Will discuss with Dr. Song to make sure he is okay with pt remaining off work until her surgery date of 1/23/23 (surgery pushed out-last work note provided 9/22/22).    Oracoi SORENSEN CMA ([facility]), ROT

## 2022-11-10 NOTE — TELEPHONE ENCOUNTER
Caller: MIRIAM     Relationship: SELF     Best call back number: [phone]     What form or medical record are you requesting: UPDATED WORK NOTE     Who is requesting this form or medical record from you: PATIENTS WORK     How would you like to receive the form or medical records (pick-up, mail, fax): MY CHART

## 2022-11-10 NOTE — TELEPHONE ENCOUNTER
Patient States she was instructed to call our office to get a appointment with Gnii Solis for 3:45 or 4:00 . After reviewing patient's chart patient has not been seen by Gini since May and the most recent refills was on  5-18 with 1 refill so patient should have been with out medication from provider.I advised patient since its been over 3 months since she last saw provider our office would have to make sure with Provider if she would need a 30 min or 1 hour appt .    Patient was very understanding . Advised someone who reach out to her on Monday .

## 2022-11-11 NOTE — TELEPHONE ENCOUNTER
"Spoke to pt to let her know that because she hasn't seen Dr. Song since August and since we don't typically keep  pt's off work for this amount of time prior to surgery that it wouldn't be feasible to provide an updated work note that keeps her off work until 1/23/22 (new surgical date); I explained that Dr. Song could provide a note that allows her return to work with light duty restrictions but otherwise we would have to bring her in for a repeat evaluation; She explains that she is a  and there really isn't any \"light duty\" work for her; So she reports that she would try to figure out what she needed to do but may have to call back to schedule an appt; I told her this would be fine.    Oracio SORENSEN CMA ([facility]), ROT    "

## 2022-11-17 NOTE — TELEPHONE ENCOUNTER
Tried calling the pt could not leave a vm, mailbox was full.     Sent pt a [employer] message for her to call the office to schedule.

## 2022-12-06 DIAGNOSIS — F41.8 DEPRESSION WITH ANXIETY: ICD-10-CM

## 2022-12-06 DIAGNOSIS — F43.10 POST TRAUMATIC STRESS DISORDER (PTSD): Chronic | ICD-10-CM

## 2022-12-06 DIAGNOSIS — F33.1 MODERATE EPISODE OF RECURRENT MAJOR DEPRESSIVE DISORDER: Chronic | ICD-10-CM

## 2022-12-06 RX ORDER — BREXPIPRAZOLE 3 MG/1
TABLET ORAL
Qty: 30 TABLET | Refills: 1 | OUTPATIENT
Start: 2022-12-06

## 2022-12-07 RX ORDER — DIAZEPAM 5 MG/1
TABLET ORAL
Qty: 60 TABLET | Refills: 0 | Status: SHIPPED | OUTPATIENT
Start: 2022-12-07 | End: 2023-01-23

## 2022-12-28 ENCOUNTER — TELEMEDICINE (OUTPATIENT)
Dept: PSYCHIATRY | Facility: CLINIC | Age: 57
End: 2022-12-28

## 2022-12-28 DIAGNOSIS — G47.9 SLEEP DIFFICULTIES: Chronic | ICD-10-CM

## 2022-12-28 DIAGNOSIS — F43.10 POST TRAUMATIC STRESS DISORDER (PTSD): Primary | Chronic | ICD-10-CM

## 2022-12-28 DIAGNOSIS — F33.2 SEVERE EPISODE OF RECURRENT MAJOR DEPRESSIVE DISORDER, WITHOUT PSYCHOTIC FEATURES: Chronic | ICD-10-CM

## 2022-12-28 PROCEDURE — 99215 OFFICE O/P EST HI 40 MIN: CPT | Performed by: NURSE PRACTITIONER

## 2022-12-28 RX ORDER — DOXEPIN HYDROCHLORIDE 10 MG/1
CAPSULE ORAL
Qty: 60 CAPSULE | Refills: 1 | Status: SHIPPED | OUTPATIENT
Start: 2022-12-28

## 2022-12-28 RX ORDER — BREXPIPRAZOLE 3 MG/1
1 TABLET ORAL DAILY
Qty: 30 TABLET | Refills: 1 | Status: SHIPPED | OUTPATIENT
Start: 2022-12-28

## 2022-12-28 RX ORDER — SERTRALINE HYDROCHLORIDE 100 MG/1
TABLET, FILM COATED ORAL
Qty: 45 TABLET | Refills: 1 | Status: SHIPPED | OUTPATIENT
Start: 2022-12-28

## 2022-12-28 NOTE — TREATMENT PLAN
Multi-Disciplinary Problems (from Behavioral Health Treatment Plan)    Active Problems     Problem: Depression  Start Date: 12/28/22    Problem Details: The patient self-scales this problem as a 9 with 10 being the worst.        Goal Priority Start Date Expected End Date End Date    Patient will demonstrate the ability to initiate new constructive life skills outside of sessions on a consistent basis. -- 12/28/22 -- --    Goal Details: Progress toward goal:  The patient self-scales their progress related to this goal as a 9 with 10 being the worst.        Goal Intervention Frequency Start Date End Date    Assist patient in setting attainable activities of daily living goals. PRN 12/28/22 --    Goal Intervention Frequency Start Date End Date    Provide education about depression Q Month 12/28/22 --    Intervention Details: Duration of treatment until until discharged.        Goal Intervention Frequency Start Date End Date    Assist patient in developing healthy coping strategies. Q Month 12/28/22 --    Intervention Details: Duration of treatment until until discharged.              Problem: Post Traumatic Stress  Start Date: 12/28/22    Problem Details: The patient self-scales this problem as a 9 with 10 being the worst.        Goal Priority Start Date Expected End Date End Date    Patient will process and move through trauma in a way that improves self regard and the patients ability to function optimally in the world around them. -- 12/28/22 -- --    Goal Details: Progress toward goal:  The patient self-scales their progress related to this goal as a 9 with 10 being the worst.        Goal Intervention Frequency Start Date End Date    Assist patient in identifying ways that trauma has negatively impacted their view of themselves and the world. Q Month 12/28/22 --    Intervention Details: Duration of treatment until until discharged.        Goal Intervention Frequency Start Date End Date    Process trauma in the  context of the safe session environment. Q Month 12/28/22 --    Intervention Details: Duration of treatment until until discharged.        Goal Intervention Frequency Start Date End Date    Develop a plan of behavior changes that will reduce the stress of the trauma. Q Month 12/28/22 --    Intervention Details: Duration of treatment until until discharged.                           I have discussed and reviewed this treatment plan with the patient and/or guardian.  The patient has verbally agreed with this treatment plan (no signatures are obtained at today's visit as the patient is a telehealth patient and is unable to print and sign this document, therefore verbal agreement is obtained).

## 2022-12-28 NOTE — PROGRESS NOTES
"This provider is located at the [facility] (through [facility]), [address], [facility], [address] using a secure [employer] Video Visit through [employer]. Patient is being seen remotely via telehealth at their home address in [place], and stated they are in a secure environment for this session. The patient's condition being diagnosed/treated is appropriate for telemedicine. The provider identified herself as well as her credentials.   The patient, and/or patients guardian, consent to be seen remotely, and when consent is given they understand that the consent allows for patient identifiable information to be sent to a third party as needed.   They may refuse to be seen remotely at any time. The electronic data is encrypted and password protected, and the patient and/or guardian has been advised of the potential risks to privacy not withstanding such measures.    You have chosen to receive care through a telehealth visit.  Do you consent to use a video/audio connection for your medical care today? Yes      *Completed 25 minutes of appointment via EPIC video before losing connection and having to call pt to complete the remainder of appointment via telephone. Total amount of time spent with pt was 53 minutes*        Subjective   Vish Russo is a 57 y.o. female who presents today for follow up    Chief Complaint:  Depression, PTSD, anxiety, and sleep disturbance    Accompanied by: Pt was alone for duration of appointment    History of Present Illness:   Pt was last seen by this APRN on 5/18/22.   Pt states she had her \"ups and downs\" and then everything went \"haywire\" before Izzy. Per pt, her mother unintentionally overdosed on Baclofen and Benadryl on Thanksgiving and spent four days in the hospital. Pt's stepfather has severe COPD and was doing poorly but is starting to do better. It was also suspected that pt's sister relapsed; all signs pointed to it but it was " "never confirmed. Pt recently started a new job at [employer]. 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 Pt is not able to do hair right now due to needing surgery, which has been pushed back to January. Pt states she began doing poorly when she failed to  the Rexulti prescription in November for about 2.5 weeks. Pt then picked up the medication and started doing well again until she ran out 1.5 weeks ago. Pt also ran out of the Zoloft about two weeks ago. Pt feels she was doing fairly well on the combination of the two medications and would like to restart them. Pt is no longer taking the Prazosin. She is currently having nightmares once or twice a month and they are usually triggered by something. Pt is feeling \"pretty insignificant\" right now. Pt feels she is simply existing. Per pt, she wanted her daughters and grandchildren to come to her house to do a gift exchange and then go over to pt's mother's house for [place]. Pt states her youngest daughter told pt that [place] isn't about doing a gift exchange at her house but to go to pt's mother's house. Pt reports this hurt her feelings. Pt states her youngest daughter gave each family member thoughtful gifts and gave her a gift card, which she used to pay a bill. Per pt, there were a lot of photos taken at [employer] and she isn't in one of them. While she was in the kitchen cleaning up, her entire family took a photo together and didn't include her. Pt states the Trazodone made her feel groggy the following day, therefore, her PCP recently changed her to doxepin 3-6 mg PO QHS. Unfortunately, her insurance would not cover it. This APRN will try sending in doxepin 10-20 mg PO QHS PRN for sleep. The patient denies any new medical problems since last appointment with this facility. The patient rates their depression on average in the past week at a 9/10 on a 0-10 scale, with 10 being the worst. The patient rates their " anxiety on average the past week at a 9/10 on a 0-10 scale, with 10 being the worst. The patient would like to restart their medications at this visit. The patient denies any suicidal or homicidal ideations, plans, or intent at today's encounter and is convincing. The patient denies any auditory hallucinations or visual hallucinations. The patient does not endorse any significant symptoms consistent with royal or psychosis at today's encounter.     *If the patient has any concerns or needs assistance, they may call the [facility] at [phone]*        Prior Psychiatric Medications:  Viibryd  Wellbutrin XL  Valium  Trazodone - groggy the next day  Vraylar  Abilify  Cymbalta  Seroquel  Zyprexa - swelling  Propranolol - swelling  Prozac - no feeling on it   Buspar - ineffective  Zoloft - helps  Rexulti - helps          The following portions of the patient's history were reviewed and updated as appropriate: allergies, current medications, past family history, past medical history, past social history, past surgical history and problem list.          Past Medical History:  Past Medical History:   Diagnosis Date   • Kidney infection    • Peptic ulceration        Social History:  Social History     Socioeconomic History   • Marital status:    Tobacco Use   • Smoking status: Every Day     Packs/day: 0.25     Types: Cigarettes   • Smokeless tobacco: Never   Substance and Sexual Activity   • Alcohol use: Yes     Comment: Occasional use   • Drug use: No   • Sexual activity: Yes       Family History:  Family History   Problem Relation Age of Onset   • Arthritis Mother    • Depression Mother    • Hyperlipidemia Other    • Hypertension Other    • Liver disease Other    • Other Other         malignant neoplasm   • Heart attack Other    • Obesity Other    • Osteoporosis Other    • Anxiety disorder Sister    • Depression Sister    • Drug abuse Sister    • Breast cancer Neg Hx    • Ovarian  cancer Neg Hx        Past Surgical History:  Past Surgical History:   Procedure Laterality Date   • HYSTERECTOMY  04/12/1996    Partial hysterectomy       Problem List:  Patient Active Problem List   Diagnosis   • Depression with anxiety   • Arthritis   • Bursitis   • Pure hypercholesterolemia   • Cyst of left ovary   • Perennial allergic rhinitis   • Goiter diffuse, nontoxic   • Quit smoking within past year   • Migraine without status migrainosus, not intractable   • Moderate episode of recurrent major depressive disorder (HCC)   • Post traumatic stress disorder (PTSD)   • Biceps tendinitis of right upper extremity   • Traumatic complete tear of right rotator cuff   • Impingement syndrome of right shoulder   • Right shoulder pain       Allergy:   Allergies   Allergen Reactions   • Imitrex [Sumatriptan] Confusion     Also caused chest pain   • Propranolol Swelling   • Zyprexa [Olanzapine] Swelling        Current Medications:   Current Outpatient Medications   Medication Sig Dispense Refill   • Brexpiprazole (Rexulti) 3 MG tablet Take 1 tablet by mouth Daily. 30 tablet 1   • sertraline (Zoloft) 100 MG tablet Take 1.5 tablets PO Daily 45 tablet 1   • baclofen (LIORESAL) 20 MG tablet TAKE ONE TABLET BY MOUTH TWICE A DAY 60 tablet 5   • COVID-19 Home Collection Test kit 1 each by In Vitro route 1 (One) Time As Needed (covid symptoms) for up to 1 dose. 2 kit 0   • diazePAM (VALIUM) 5 MG tablet TAKE ONE TABLET BY MOUTH EVERY 12 HOURS AS NEEDED FOR ANXIETY 60 tablet 0   • doxepin (SINEquan) 10 MG capsule Take 1-2 tablets PO QHS PRN for sleep 60 capsule 1   • estradiol (ESTRACE VAGINAL) 0.1 MG/GM vaginal cream 1g PV 3x weekly x2 weeks 42.5 g 0   • fluticasone (FLONASE) 50 MCG/ACT nasal spray SPRAY TWO SPRAYS IN EACH NOSTRIL ONCE DAILY 48 mL 1   • omeprazole (priLOSEC) 40 MG capsule Take 1 capsule by mouth Daily. 30 capsule 2   • ondansetron ODT (ZOFRAN-ODT) 8 MG disintegrating tablet Take 1 tablet by mouth (dissolve under  tongue or swallow) every 8 hours as needed for nausea. 30 tablet 5   • Semaglutide-Weight Management (Wegovy) 0.25 MG/0.5ML solution auto-injector Inject 0.25 mg under the skin into the appropriate area as directed 1 (One) Time Per Week. 2 mL 0   • Synthroid 75 MCG tablet TAKE ONE TABLET BY MOUTH EVERY MORNING WHILE FASTING. WAIT ONE HOUR BEFORE EATING OR TAKING ANY OTHER MEDICATION 30 tablet 5     No current facility-administered medications for this visit.       Review of Symptoms:    Review of Systems   Constitutional: Positive for activity change, appetite change and fatigue.   Psychiatric/Behavioral: Positive for decreased concentration, sleep disturbance, depressed mood and stress. The patient is nervous/anxious.          Physical Exam:   Due to the remote nature of this encounter (virtual encounter), vitals were unable to be obtained.  Height stated at 65 inches.  Weight stated at 196 pounds.      Physical Exam  Neurological:      Mental Status: She is alert.   Psychiatric:         Attention and Perception: Attention and perception normal. She does not perceive auditory or visual hallucinations.         Mood and Affect: Mood is anxious and depressed. Affect is tearful.         Speech: Speech normal.         Behavior: Behavior normal. Behavior is cooperative.         Thought Content: Thought content normal. Thought content is not paranoid or delusional. Thought content does not include homicidal or suicidal ideation. Thought content does not include homicidal or suicidal plan.         Cognition and Memory: Cognition normal.           Mental Status Exam:   Hygiene:   good  Cooperation:  Cooperative  Eye Contact:  Good  Psychomotor Behavior:  Appropriate  Affect:  Appropriate  Mood: depressed and anxious  Speech:  Normal  Thought Process:  Goal directed  Thought Content:  Mood congruent  Suicidal:  None  Homicidal:  None  Hallucinations:  None  Delusion:  None  Memory:  Intact  Orientation:  Person, Place, Time  and Situation  Reliability:  good  Insight:  Fair  Judgement:  Fair  Impulse Control:  Fair        Previous Provider notes and available records reviewed by this APRN at today's encounter.         Lab Results:   No visits with results within 1 Month(s) from this visit.   Latest known visit with results is:   Lab on 09/06/2022   Component Date Value Ref Range Status   • Nicotine 09/06/2022 <1.0  ng/mL Final    This test was developed and its performance characteristics  determined by [facility]. It has not been cleared or approved  by the Food and Drug Administration.  Nicotine levels greater than 2.0 are consistent with the use of  tobacco or tobacco cessation products.   • Cotinine 09/06/2022 25.0  ng/mL Final    This test was developed and its performance characteristics  determined by [facility]. It has not been cleared or approved  by the Food and Drug Administration.  Cotinine levels greater than 20.0 are consistent with the use of  tobacco or tobacco cessation products.         Assessment & Plan   Problems Addressed this Visit        Mental Health    Post traumatic stress disorder (PTSD) - Primary    Relevant Medications    Brexpiprazole (Rexulti) 3 MG tablet    sertraline (Zoloft) 100 MG tablet    doxepin (SINEquan) 10 MG capsule   Other Visit Diagnoses     Severe episode of recurrent major depressive disorder, without psychotic features (HCC)  (Chronic)       Relevant Medications    Brexpiprazole (Rexulti) 3 MG tablet    sertraline (Zoloft) 100 MG tablet    doxepin (SINEquan) 10 MG capsule    Sleep difficulties  (Chronic)       Relevant Medications    doxepin (SINEquan) 10 MG capsule      Diagnoses       Codes Comments    Post traumatic stress disorder (PTSD)    -  Primary ICD-10-CM: F43.10  ICD-9-CM: 309.81     Severe episode of recurrent major depressive disorder, without psychotic features (HCC)     ICD-10-CM: F33.2  ICD-9-CM: 296.33     Sleep difficulties     ICD-10-CM: G47.9  ICD-9-CM: 780.50           Visit  Diagnoses:    ICD-10-CM ICD-9-CM   1. Post traumatic stress disorder (PTSD)  F43.10 309.81   2. Severe episode of recurrent major depressive disorder, without psychotic features (HCC)  F33.2 296.33   3. Sleep difficulties  G47.9 780.50          GOALS:  Short Term Goals: Patient will be compliant with medication, and patient will have no significant medication related side effects.  Patient will be engaged in psychotherapy as indicated.  Patient will report subjective improvement of symptoms.  Long term goals: To stabilize mood and treat/improve subjective symptoms, the patient will stay out of the hospital, the patient will be at an optimal level of functioning, and the patient will take all medications as prescribed.  The patient verbalized understanding and agreement with goals that were mutually set.      TREATMENT PLAN:   Continue supportive psychotherapy efforts and medications as indicated.   -Restart Zoloft 100 mg PO Daily x7 days, then increase to 150 mg PO Daily for PTSD and depression  -Discontinue Trazodone  -Discontinue prazosin  -Restart Rexulti 1.5 mg x7 days, then increase to 3 mg PO Daily for depression and anxiety.   -Start doxepin 10-20 mg PO QHS PRN for sleep  -Pt is prescribed Valium 5 mg PO every 8 hours PRN for anxiety/sleep from PCP    *Pt read back instructions and verbalized understanding*    Medication and treatment options, both pharmacological and non-pharmacological treatment options, discussed during today's visit, including any off label use of medication. Patient acknowledged and verbally consented with current treatment plan and was educated on the importance of compliance with treatment and follow-up appointments.        MEDICATION ISSUES:    Discussed treatment plan and medication options of prescribed medication as well as the risks, benefits, any black box warnings, and side effects including potential falls, possible impaired driving, and metabolic adversities among others,  including any off label use of medication. Patient is agreeable to call the office with any worsening of symptoms or onset of side effects, or if any concerns or questions arise.  The contact information for the office is made available to the patient. Patient is agreeable to call 911 or go to the nearest ER should they begin having any SI/HI, or if any urgent concerns arise.       SUICIDE RISK ASSESSMENT: Unalterable demographics and a history of mental health intervention indicate this patient is in a high risk category compared to the general population. At present, the patient denies active SI/HI, intentions, or plans at this time and agrees to seek immediate care should such thoughts develop. The patient verbalizes understanding of how to access emergency care if needed and agrees to do so. Consideration of suicide risk and protective factors such as history, current presentation, individual strengths and weaknesses, psychosocial and environmental stressors and variables, psychiatric illness and symptoms, medical conditions and pain, took place in this interview. Based on those considerations, the patient is determined: within individual baseline and presenting no imminent risk for suicide or homicide. Other recommendations: The patient does not meet the criteria for inpatient admission and is not a safety risk to self or others at today's visit. Inpatient treatment offers no significant advantages over outpatient treatment for this patient at today's visit.      SAFETY PLAN:  Patient was given ample time for questions and fully participated in treatment planning.  Patient was encouraged to call the clinic with any questions or concerns.  Patient was informed of access to emergency care. If patient were to develop any significant symptomatology, suicidal ideation, homicidal ideation, any concerns, or feel unsafe at any time they are to call the clinic and if unable to get immediate assistance should immediately  call 911 or go to the nearest emergency room.  The patient is advised to remove or secure (lock away) all lethal weapons (including guns) and sharps (including razors, scissors, knives, etc.).  All medications (including any prescribed and any over the counter medications) should be stored in a safe and secured location that is not obtainable by children/adolescents.  Patient was given an opportunity and encouraged to ask questions about their medication, illness, and treatment. Patient contracted verbally for the following: If you are experiencing an emotional crisis or have thoughts of harming yourself or others, please go to your nearest local emergency room or call 911. Will continue to re-assess medication response and side effects frequently to establish efficacy and ensure safety. Risks, any black box warnings, side effects, off label usage, and benefits of medication and treatment discussed with patient, along with potential adverse side effects of current and/or newly prescribed medication, alternative treatment options, and OTC medications.  Patient verbalized understanding of potential risks, any off label use of medication, any black box warnings, and any side effects in their own words. The patient verbalized understanding and agreed to comply with the safety plan discussed in their own words.  Patient given the number to the office. Number also available to the 24- hour suicide hotline.      MEDS ORDERED DURING VISIT:  New Medications Ordered This Visit   Medications   • Brexpiprazole (Rexulti) 3 MG tablet     Sig: Take 1 tablet by mouth Daily.     Dispense:  30 tablet     Refill:  1   • sertraline (Zoloft) 100 MG tablet     Sig: Take 1.5 tablets PO Daily     Dispense:  45 tablet     Refill:  1   • doxepin (SINEquan) 10 MG capsule     Sig: Take 1-2 tablets PO QHS PRN for sleep     Dispense:  60 capsule     Refill:  1       Return in about 4 weeks (around 1/25/2023), or if symptoms worsen or fail to  improve, for Recheck.     Treatment plan completed: 12/28/22    Progress toward goal: Not at goal    Functional Status: Moderate impairment     Prognosis: Fair with Ongoing Treatment         This document has been electronically signed by CHIP Edward  December 28, 2022 16:57 EST     Visit Time (face to face direct patient care):  In/Start Time: 3:43 PM.  Out/Stop: 4:35 PM  53 minutes of face to face direct patient care with the patient spent in coordination of care and counseling the patient regarding diagnoses and treatment planning. Answered any questions patient had with medication and treatment plan.          Total Time spent by this APRN for today's encounter:  24 total minutes were spent by this APRN on charting/documentation, review of past medical records, direct face to face patient care, and coordination of care.     Some of the data in this electronic note has been brought forward from a previous encounter, any necessary changes have been made, it has been reviewed by this APRN, and it is accurate.    Please note that portions of this note were completed with a voice recognition program. Efforts were made to edit dictation, but occasionally words are mistranscribed.

## 2022-12-29 ENCOUNTER — TELEPHONE (OUTPATIENT)
Dept: ORTHOPEDIC SURGERY | Facility: CLINIC | Age: 57
End: 2022-12-29

## 2022-12-29 NOTE — TELEPHONE ENCOUNTER
Provider: DR. NATION  Caller: MIRIAM BANDA  Relationship to Patient: SELF  Phone Number: [phone]  Reason for Call: PATIENT CALLED STATING HER JOB IS REQUESTING AN UPDATED WORK NOTE FOR LIGHT DUTY AND TO INCLUDE DATE OF SURGERY. THEY WOULD LIKE NOTE AVAILABLE ON [employer] IF POSSIBLE. PLEASE ADVISE.

## 2023-01-06 ENCOUNTER — PRIOR AUTHORIZATION (OUTPATIENT)
Dept: PSYCHIATRY | Facility: CLINIC | Age: 58
End: 2023-01-06
Payer: COMMERCIAL

## 2023-01-11 ENCOUNTER — TELEMEDICINE (OUTPATIENT)
Dept: PSYCHIATRY | Facility: CLINIC | Age: 58
End: 2023-01-11
Payer: COMMERCIAL

## 2023-01-11 DIAGNOSIS — F43.10 POST TRAUMATIC STRESS DISORDER (PTSD): Primary | ICD-10-CM

## 2023-01-11 DIAGNOSIS — F33.1 MODERATE EPISODE OF RECURRENT MAJOR DEPRESSIVE DISORDER: ICD-10-CM

## 2023-01-11 DIAGNOSIS — F41.1 GENERALIZED ANXIETY DISORDER: ICD-10-CM

## 2023-01-11 PROCEDURE — 90837 PSYTX W PT 60 MINUTES: CPT | Performed by: COUNSELOR

## 2023-01-20 DIAGNOSIS — F41.8 DEPRESSION WITH ANXIETY: ICD-10-CM

## 2023-01-23 ENCOUNTER — DOCUMENTATION (OUTPATIENT)
Dept: ORTHOPEDIC SURGERY | Facility: CLINIC | Age: 58
End: 2023-01-23

## 2023-01-23 ENCOUNTER — OUTSIDE FACILITY SERVICE (OUTPATIENT)
Dept: ORTHOPEDIC SURGERY | Facility: CLINIC | Age: 58
End: 2023-01-23
Payer: COMMERCIAL

## 2023-01-23 DIAGNOSIS — M75.51 BURSITIS OF RIGHT SHOULDER: ICD-10-CM

## 2023-01-23 DIAGNOSIS — M75.41 IMPINGEMENT SYNDROME OF RIGHT SHOULDER: ICD-10-CM

## 2023-01-23 DIAGNOSIS — Z98.890 STATUS POST RIGHT ROTATOR CUFF REPAIR: Primary | ICD-10-CM

## 2023-01-23 DIAGNOSIS — S46.011A TRAUMATIC COMPLETE TEAR OF RIGHT ROTATOR CUFF, INITIAL ENCOUNTER: ICD-10-CM

## 2023-01-23 DIAGNOSIS — M75.21 BICEPS TENDINITIS OF RIGHT UPPER EXTREMITY: ICD-10-CM

## 2023-01-23 DIAGNOSIS — M25.511 RIGHT SHOULDER PAIN, UNSPECIFIED CHRONICITY: ICD-10-CM

## 2023-01-23 PROCEDURE — 29827 SHO ARTHRS SRG RT8TR CUF RPR: CPT | Performed by: ORTHOPAEDIC SURGERY

## 2023-01-23 PROCEDURE — 29826 SHO ARTHRS SRG DECOMPRESSION: CPT

## 2023-01-23 PROCEDURE — 29826 SHO ARTHRS SRG DECOMPRESSION: CPT | Performed by: ORTHOPAEDIC SURGERY

## 2023-01-23 PROCEDURE — 29827 SHO ARTHRS SRG RT8TR CUF RPR: CPT

## 2023-01-23 RX ORDER — HYDROCODONE BITARTRATE AND ACETAMINOPHEN 10; 325 MG/1; MG/1
1 TABLET ORAL EVERY 4 HOURS PRN
Qty: 42 TABLET | Refills: 0 | Status: SHIPPED | OUTPATIENT
Start: 2023-01-23 | End: 2023-01-30

## 2023-01-23 RX ORDER — DIAZEPAM 5 MG/1
TABLET ORAL
Qty: 60 TABLET | Refills: 2 | Status: SHIPPED | OUTPATIENT
Start: 2023-01-23

## 2023-01-23 RX ORDER — ONDANSETRON 4 MG/1
4 TABLET, FILM COATED ORAL EVERY 6 HOURS PRN
Qty: 30 TABLET | Refills: 1 | Status: SHIPPED | OUTPATIENT
Start: 2023-01-23 | End: 2023-01-31

## 2023-01-23 NOTE — PROGRESS NOTES
Operative Report     Side/SHOULDER: Right shoulder    LOCATION: [facility]  [address] [place] [address]    [facility] OPERATIVE REPORT       Surgeon: Ismael Song MD     Assistant: SHANAE Best     The skilled assistance of the above noted first assistant was necessary during this complex surgical procedure.  The surgical assistant assisted with every aspect of the operation including, but not limited to, proper and safe positioning of the patient, obtaining adequate surgical exposure, manipulation of surgical instruments, suture management, surgical knot tying when necessary, the continual process of hemostasis during the procedure itself in addition to surgical wound closure and removal of the patient from the operating table and returning the patient back to the [facility].  The assistance of the surgical assistant allowed me to perform the most sensitive and technical potions of this operation using 2 hands, thus enhancing efficiency and patient safety.  This would not be possible without the help of a skilled assistant familiar with the procedure and capable of safely performing the aforementioned tasks.     Date of surgery 1/23/2023  Right shoulder  Preoperative Diagnosis:  1.     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     Shoulder impingement syndrome - treated with arthroscopic subacromial decompression with acromioplasty - CPT 02853     Postoperative Diagnosis:  1.     SAME as preoperative diagnoses     Procedure:  1.     Arthroscopic rotator cuff repair (1x2) - CPT 74911  2.     Arthroscopic subacromial decompression with acromioplasty - CPT 30754        Admission status: elective outpatient surgery     COVID-19 Statement: The patient understands that the surgery is elective surgery.  Patient is aware of the ongoing COVID-19 pandemic and potential  implications.    Complications: None     EBL: 10mL     Specimen: NONE     Anesthesia: general anesthesia     Block: regional interscalene block with single shot per anesthesia     Antibiotics: weight based IV antibiotics infused prior to incision     Time out: Time out was called and the patient, side, site, and intended procedures were confirmed.     Counts: Needle and sponge counts were correct prior to closure.     DVT prophylaxis: The patient will be weight bearing as tolerated on bilateral lower extremities postoperatively with no additional chemical DVT prophylaxis indicated.     Marked: The patient was marked with an indelible marker in the preoperative holding area confirming the correct side and site.     History & Physical:   A history and physical examination was completed and updated in the preoperative holding area.     Consent: The patient signed the consent form for surgery with an understanding of the risks and benefits as outlined in the clinic and in the preoperative holding area.     Risks and Benefits: Specific risks and benefits discussed included - pain, bleeding, infection, injury to nerves or blood vessels, fracture, stiffness, failure to heal, revision surgery, deformity of biceps or asymmetry, complications of the block, anesthetic complications, and medical complications associated with surgery.  COVID-19 awareness as noted and discussed.        Indications for surgery:  The patient has history, physical examination, and radiographic findings confirming the diagnoses.  The patient has persistent pain and functional loss unresponsive to non-operative treatment consisting of selective rest/activity modification, medications, and exercises.  MRI documented the status of the rotator cuff - rotator cuff tearing was noted.     Impingement syndrome - the patient had history and clinical exam findings consistent with shoulder impingement syndrome.  Additionally, the patient had subacromial  bursitis which was encountered during the arthroscopic shoulder procedure.  Subacromial decompression with minimal acromioplasty was indicated as part of the treatment of impingement syndrome, and additionally to enhance arthroscopic visualization to enable minimally invasive, arthroscopic rotator cuff repair.     I initially saw the patient in July 2022 and indicated her for surgery at that time she had noted at least 2-1/2 months of symptoms prior to that.  Due to patient circumstances/choice the surgery has been pushed back until now an additional 6-1/2 months following her initial evaluation in clinic.    Chronic dysplastic features of the cuff were noted at this time with a stellate type pattern along with crescent tearing     Operative Findings:  Rotator Cuff Tissue Quality: Chronic dysplastic features     Status of the Rotator Cuff:  Supraspinatus - full-thickness tearing 16+ millimeters extending posteriorly        Rotator Cuff Repair Construct:  1x2 Transosseous Equivalent Double Row Arthroscopic Rotator Cuff Repair      Rotator Cuff Implants:  Medial Row: 1 Medial [place] - [employer] triple loaded 5.5mm Healicoil Regenesorb   Lateral Row: 2 Lateral Anchors - [employer] 5.5mm Footprint PEEK      Bone Quality: Good bone quality     Labrum: Mild degenerative fraying     Humeral Head: Chronic articular cartilage changes in the anterior and superior portion of the humeral head  Glenoid: Negative     Contracture: Negative  Pathological laxity: Negative     Procedure in detail:  Regional interscalene block was completed in the preoperative area by the anesthesia team.  The patient was supine on the operative table and the anesthesia team initiated general anesthesia.  The patient was placed in a modified beach chair position with the neck secured in neutral alignment and all bony prominences were well padded.     The shoulder was assessed with an examination under anesthesia.     The operative extremity  was cleaned with alcohol and then the extremity was prepped and draped in the standard fashion.  The surgical arm was placed into a well-padded arm bonilla. A standard posterior portal was created with an incision made 1 cm inferior 1 cm medial to the posterolateral acromial corner.  A blunt metal trocar and cannula were inserted into the glenohumeral joint.  The arthroscopic pump was connected and the above findings and diagnoses were noted as part of the diagnostic shoulder arthroscopy.       A spinal needle was used to localize the anterior portal position in the rotator interval. A 5.5mm plastic cannula and trocar were inserted followed by a 4.5mm shaver/cautery device.  The shaver was used for debridement in the glenohumeral joint.  The biceps tendon was closely inspected and biceps tenodesis was not warranted.  The biceps was closely scrutinized and well-appearing.     The arthroscope and metal cannula were then removed in order to transition to the subacromial space.  The blunt metal trocar and cannula were inserted into the subacromial space and the lateral portal site identified with a spinal needle.  An 8 mm plastic cannula and trocar were inserted.  I turned my attention to the arthroscopic subacromial decompression with acromioplasty. Bursitis was noted in the subacromial space and impacted visualization of the rotator cuff.  The 4.5mm shaver/cautery device was used to clear the subacromial space until the acromion could be identified and bursal resection was completed to allow rotator cuff visualization.  The shaver was used to remove fibrous tissue from the acromial undersurface until the anterolateral and anterior medial corners of the acromion were clearly identified.  The coracoacromial ligament was not released.  The shaver was used to perform a minimal acromioplasty-CA ligament tearing was noted and was debrided.  The shaver/cautery device was used to perform the subacromial decompression with  minimal acromioplasty.      I turned my attention to the rotator cuff tear and the arthroscopic rotator cuff repair.    Chronic dysplastic features were noted with a crescent component and a stellate component.  The torn rotator cuff tendon was grasped with a handheld grasper and assessed for mobility and integrity.  The soft tissue at the greater tuberosity insertion site was removed and a power shaver was used to create a healthy bleeding bed to enhance rotator cuff tendon healing.     Arthroscopic rotator cuff suture anchors were then inserted for the rotator cuff repair.  The single medial row arthroscopic rotator cuff repair anchor was inserted and the sutures from the anchors were withdrawn through the anterior cannula. An arthroscopic suture passer was used to place the high strength sutures through the rotator cuff tendon in an inverted mattress fashion. The sutures were then inserted laterally into 2 lateral knotless anchors in a cruciform pattern with appropriate tensioning.   I utilized additional white suture limbs from the posterior lateral anchor for additional fixation/knot tying of the additional suture limbs past. The completed arthroscopic rotator cuff repair was inspected dynamically and the arthroscopic instruments removed along with the arthroscopic fluid. The incisions were closed with nylon suture and steri-strips were placed over the incisions.  A sterile dressing was applied followed by a neutral rotation sling.  The patient tolerated the procedure well and was transported to the recovery room in satisfactory condition.        Postoperative Plan:  Neutral rotation sling  Non-weight bearing  No biceps loading  Pendulums, elbow, wrist, and hand exercises encouraged  Clinic follow-up appointment scheduled for 2 weeks after surgery      Rotator Cuff Repair - POSTOPERATIVE PLAN:  Follow-up in the office in 2 weeks with 1 view of the operative shoulder at that time  Sutures: Nylon sutures to come  out in 2 weeks  DVT prophylaxis: No additional chemical DVT prophylaxis indicated.  Weightbearing: Nonweightbearing on operative extremity  Sling for 3 to 4 weeks following the surgery  Physical therapy: Formal outpatient physical therapy will be provided at the 2-week appointment in the office.  Rotator cuff repair protocol    Electronically signed by Ismael Song MD, 01/23/23, 10:50 AM EST.

## 2023-02-02 ENCOUNTER — TELEPHONE (OUTPATIENT)
Dept: ORTHOPEDIC SURGERY | Facility: CLINIC | Age: 58
End: 2023-02-02

## 2023-02-03 DIAGNOSIS — Z98.890 STATUS POST RIGHT ROTATOR CUFF REPAIR: Primary | ICD-10-CM

## 2023-02-03 DIAGNOSIS — M75.51 BURSITIS OF RIGHT SHOULDER: ICD-10-CM

## 2023-02-03 DIAGNOSIS — S46.011A TRAUMATIC COMPLETE TEAR OF RIGHT ROTATOR CUFF, INITIAL ENCOUNTER: ICD-10-CM

## 2023-02-03 DIAGNOSIS — M75.21 BICEPS TENDINITIS OF RIGHT UPPER EXTREMITY: ICD-10-CM

## 2023-02-03 RX ORDER — HYDROCODONE BITARTRATE AND ACETAMINOPHEN 10; 325 MG/1; MG/1
1 TABLET ORAL EVERY 6 HOURS PRN
Qty: 28 TABLET | Refills: 0 | Status: SHIPPED | OUTPATIENT
Start: 2023-02-03 | End: 2023-02-10

## 2023-02-07 ENCOUNTER — OFFICE VISIT (OUTPATIENT)
Dept: ORTHOPEDIC SURGERY | Facility: CLINIC | Age: 58
End: 2023-02-07
Payer: COMMERCIAL

## 2023-02-07 VITALS — TEMPERATURE: 97.1 F

## 2023-02-07 DIAGNOSIS — M75.21 BICEPS TENDINITIS OF RIGHT UPPER EXTREMITY: ICD-10-CM

## 2023-02-07 DIAGNOSIS — M75.41 IMPINGEMENT SYNDROME OF RIGHT SHOULDER: ICD-10-CM

## 2023-02-07 DIAGNOSIS — Z98.890 STATUS POST RIGHT ROTATOR CUFF REPAIR: Primary | ICD-10-CM

## 2023-02-07 PROCEDURE — 99024 POSTOP FOLLOW-UP VISIT: CPT

## 2023-02-07 NOTE — PROGRESS NOTES
[facility] Orthopaedic Surgery Office Follow Up       Office Follow Up Visit       Patient Name: Vish Russo    Chief Complaint:   Chief Complaint   Patient presents with   • Post-op     2 weeks s/p right shoulder arthroscopic rotator cuff repair (1/23/23)       Referring Physician: No ref. provider found    History of Present Illness:   Vish Russo returns to clinic today for 2-week postop visit following right shoulder arthroscopy for rotator cuff repair and subacromial decompression 1/23/2023 with Dr. Song.  She is doing well.  Pain has been controlled with oral medications.  She has been compliant with sling use.  She is eager to return to work with restrictions.  She is a right-hand-dominant hairdresser.      Subjective     Review of Systems   Constitutional: Negative.  Negative for chills, fatigue and fever.   HENT: Negative.  Negative for congestion and dental problem.    Eyes: Negative.  Negative for blurred vision.   Respiratory: Negative.  Negative for shortness of breath.    Cardiovascular: Negative.  Negative for leg swelling.   Gastrointestinal: Negative.  Negative for abdominal pain.   Endocrine: Negative.  Negative for polyuria.   Genitourinary: Negative.  Negative for difficulty urinating.   Musculoskeletal: Positive for arthralgias.   Skin: Negative.    Allergic/Immunologic: Negative.    Neurological: Negative.    Hematological: Negative.  Negative for adenopathy.   Psychiatric/Behavioral: Negative.  Negative for behavioral problems.        I have reviewed and updated the following portions of the patient's history and review of systems: allergies, current medications, past family history, past medical history, past social history, past surgical history and problem list.    Medications:   Current Outpatient Medications:   •  baclofen (LIORESAL) 20 MG tablet, TAKE ONE TABLET BY MOUTH TWICE A DAY, Disp: 60 tablet, Rfl: 5  •   Brexpiprazole (Rexulti) 3 MG tablet, Take 1 tablet by mouth Daily., Disp: 30 tablet, Rfl: 1  •  diazePAM (VALIUM) 5 MG tablet, TAKE ONE TABLET BY MOUTH EVERY 12 HOURS AS NEEDED FOR ANXIETY, Disp: 60 tablet, Rfl: 2  •  doxepin (SINEquan) 10 MG capsule, Take 1-2 tablets PO QHS PRN for sleep, Disp: 60 capsule, Rfl: 1  •  estradiol (ESTRACE VAGINAL) 0.1 MG/GM vaginal cream, 1g PV 3x weekly x2 weeks, Disp: 42.5 g, Rfl: 0  •  fluticasone (FLONASE) 50 MCG/ACT nasal spray, SPRAY TWO SPRAYS IN EACH NOSTRIL ONCE DAILY, Disp: 48 mL, Rfl: 1  •  HYDROcodone-acetaminophen (Norco)  MG per tablet, Take 1 tablet by mouth Every 6 (Six) Hours As Needed for Severe Pain for up to 7 days., Disp: 28 tablet, Rfl: 0  •  omeprazole (priLOSEC) 40 MG capsule, Take 1 capsule by mouth Daily., Disp: 30 capsule, Rfl: 2  •  ondansetron ODT (ZOFRAN-ODT) 8 MG disintegrating tablet, Take 1 tablet by mouth (dissolve under tongue or swallow) every 8 hours as needed for nausea., Disp: 30 tablet, Rfl: 5  •  Semaglutide-Weight Management (Wegovy) 0.25 MG/0.5ML solution auto-injector, Inject 0.25 mg under the skin into the appropriate area as directed 1 (One) Time Per Week., Disp: 2 mL, Rfl: 0  •  sertraline (Zoloft) 100 MG tablet, Take 1.5 tablets PO Daily, Disp: 45 tablet, Rfl: 1  •  Synthroid 75 MCG tablet, TAKE ONE TABLET BY MOUTH EVERY MORNING WHILE FASTING. WAIT ONE HOUR BEFORE EATING OR TAKING ANY OTHER MEDICATION, Disp: 30 tablet, Rfl: 5    Allergies:   Allergies   Allergen Reactions   • Imitrex [Sumatriptan] Confusion     Also caused chest pain   • Propranolol Swelling   • Zyprexa [Olanzapine] Swelling         Objective      Vital Signs:   Vitals:    02/07/23 0938   Temp: 97.1 °F (36.2 °C)       Ortho Exam:  General: comfortable  Vascular: 2+ radial pulse  Neurologic: sensation to light touch is intact distally, elbow flexion/elbow extension/wrist flexion/wrist extension/hand intrinsics intact, able to fire deltoid; no residual defects noted  from the block  Dermatologic: surgical incisions are well appearing, with no drainage or surrounding erythema; no signs or symptoms of infection or DVT      Results Review:  XR Shoulder 1 View Right  Imaging: shoulder x-rays 1 view - AP x-ray views    Side: RIGHT SHOULDER    Indication for shoulder x-ray 1 view: shoulder pain, postop evaluation   following surgery    Comparison: the current xray was compared to preoperative imaging and   indicates expected postoperative changes.    Findings: No acute bony pathology. Well appearing and well positioned   compared to preoperative imaging.  Located and no fracture noted.    I personally reviewed the above x-rays.       No Images in the past 120 days found..      Assessment / Plan      Assessment:   Diagnoses and all orders for this visit:    1. Status post right rotator cuff repair (Primary)  -     XR Shoulder 1 View Right  -     Ambulatory Referral to Physical Therapy Evaluate and treat, Ortho    2. Biceps tendinitis of right upper extremity  -     Ambulatory Referral to Physical Therapy Evaluate and treat, Ortho    3. Impingement syndrome of right shoulder  -     Ambulatory Referral to Physical Therapy Evaluate and treat, Ortho      Doing well status post right shoulder arthroscopy for rotator cuff repair and subacromial decompression.  Chronic tearing of rotator cuff.    Plan:  Recommend anti-inflammatories as needed for pain and swelling.  Sutures out today.  Reviewed intraoperative images with patient and Dr. Song.  Continue with sling use for the next 1 to 2 weeks.  May come out for stretching and showering.  Physical therapy referral to [address] [place] [address].  Given 2 copies of rotator cuff repair protocol.  Remain nonweightbearing on right upper extremity.    She would like to return to work.  She is a hairdresser.  She understands she is unable to do here at this time.  May return with light duty restrictions.  No lifting with right arm.  She says she is able to  answer phones and work .      Follow Up:   Return in about 2 months (around 4/7/2023) for F/U with Duncan.        Dari James PA-C  [facility]    Dictated using Dragon Speech Recognition.

## 2023-02-09 ENCOUNTER — TELEPHONE (OUTPATIENT)
Dept: ORTHOPEDIC SURGERY | Facility: CLINIC | Age: 58
End: 2023-02-09

## 2023-02-09 NOTE — TELEPHONE ENCOUNTER
"  Caller: MIRIAM BANDA    Relationship: PATIENT    Best call back number:  [phone]    What form or medical record are you requesting:  PATIENT SAYS SHE NEEDS A NOTE FOR WORK STATING SHE NEEDS A \"WIRELESS MOUSE\". PATIENT IS IN A RIGHT SHOULDER SLING AND NEEDS THIS. WORK TOLD HER THEY COULD ORDER IT BUT NEEDS A DOCTORS NOTE.    Who is requesting this form or medical record from you:  WORK-    How would you like to receive the form or medical records (pick-up, mail, fax):  FAX  If fax, what is the fax number: [phone]    Timeframe paperwork needed: ASAP    Additional notes:  PATIENT SAYS FAX NOTE TO HER ATTN: AT THE WORK FAX # ABOVE.          "

## 2023-02-09 NOTE — TELEPHONE ENCOUNTER
Dari-please see message below and advise whether it is okay to provide this type of note. Thanks!    Oracio SORENSEN CMA ([facility]), ROT

## 2023-02-13 ENCOUNTER — TELEMEDICINE (OUTPATIENT)
Dept: PSYCHIATRY | Facility: CLINIC | Age: 58
End: 2023-02-13
Payer: COMMERCIAL

## 2023-02-13 DIAGNOSIS — F43.10 POST TRAUMATIC STRESS DISORDER (PTSD): Primary | ICD-10-CM

## 2023-02-13 DIAGNOSIS — F33.1 MODERATE EPISODE OF RECURRENT MAJOR DEPRESSIVE DISORDER: ICD-10-CM

## 2023-02-13 PROCEDURE — 90837 PSYTX W PT 60 MINUTES: CPT | Performed by: COUNSELOR

## 2023-02-20 PROBLEM — F41.1 GENERALIZED ANXIETY DISORDER: Status: ACTIVE | Noted: 2023-02-20

## 2023-02-24 ENCOUNTER — TELEPHONE (OUTPATIENT)
Dept: ORTHOPEDIC SURGERY | Facility: CLINIC | Age: 58
End: 2023-02-24
Payer: COMMERCIAL

## 2023-02-24 DIAGNOSIS — Z98.890 STATUS POST RIGHT ROTATOR CUFF REPAIR: Primary | ICD-10-CM

## 2023-02-24 RX ORDER — HYDROCODONE BITARTRATE AND ACETAMINOPHEN 10; 325 MG/1; MG/1
1 TABLET ORAL EVERY 8 HOURS PRN
Qty: 21 TABLET | Refills: 0 | Status: SHIPPED | OUTPATIENT
Start: 2023-02-24 | End: 2023-03-03

## 2023-02-24 RX ORDER — BACLOFEN 20 MG/1
TABLET ORAL
Qty: 180 TABLET | Refills: 0 | Status: SHIPPED | OUTPATIENT
Start: 2023-02-24

## 2023-03-09 ENCOUNTER — HOSPITAL ENCOUNTER (OUTPATIENT)
Dept: PHYSICAL THERAPY | Facility: HOSPITAL | Age: 58
Setting detail: THERAPIES SERIES
Discharge: HOME | End: 2023-03-09
Payer: COMMERCIAL

## 2023-03-09 DIAGNOSIS — Z98.890 STATUS POST RIGHT ROTATOR CUFF REPAIR: Primary | ICD-10-CM

## 2023-03-09 DIAGNOSIS — S46.011A TRAUMATIC COMPLETE TEAR OF RIGHT ROTATOR CUFF, INITIAL ENCOUNTER: ICD-10-CM

## 2023-03-09 DIAGNOSIS — M25.511 RIGHT SHOULDER PAIN, UNSPECIFIED CHRONICITY: ICD-10-CM

## 2023-03-09 DIAGNOSIS — M75.21 BICEPS TENDINITIS OF RIGHT UPPER EXTREMITY: ICD-10-CM

## 2023-03-09 DIAGNOSIS — M75.41 IMPINGEMENT SYNDROME OF RIGHT SHOULDER: ICD-10-CM

## 2023-03-09 PROCEDURE — 97161 PT EVAL LOW COMPLEX 20 MIN: CPT | Performed by: GENERAL ACUTE CARE HOSPITAL

## 2023-03-09 NOTE — THERAPY EVALUATION
Outpatient Physical Therapy Ortho Initial Evaluation  [facility]     Patient Name: Vish Russo  : 1965  MRN: [id number]  Today's Date: 3/9/2023      Visit Date: 2023    Patient Active Problem List   Diagnosis   • Depression with anxiety   • Arthritis   • Bursitis   • Pure hypercholesterolemia   • Cyst of left ovary   • Perennial allergic rhinitis   • Goiter diffuse, nontoxic   • Quit smoking within past year   • Migraine without status migrainosus, not intractable   • Moderate episode of recurrent major depressive disorder (HCC)   • Post traumatic stress disorder (PTSD)   • Biceps tendinitis of right upper extremity   • Traumatic complete tear of right rotator cuff   • Impingement syndrome of right shoulder   • Right shoulder pain   • Status post right rotator cuff repair   • Generalized anxiety disorder        Past Medical History:   Diagnosis Date   • Acromioclavicular separation 2022   • Ankle sprain    • Kidney infection    • Peptic ulceration    • Periarthritis of shoulder    • Rotator cuff syndrome 22   • Tear of meniscus of knee         Past Surgical History:   Procedure Laterality Date   • FOOT SURGERY     • HYSTERECTOMY  1996    Partial hysterectomy   • KNEE SURGERY     • ROTATOR CUFF REPAIR Right 2023    Right shoulder arthroscopic rotator cuff repair; Dr. Ismael Song       Visit Dx:     ICD-10-CM ICD-9-CM   1. Status post right rotator cuff repair  Z98.890 V45.89   2. Traumatic complete tear of right rotator cuff, initial encounter  S46.011A 840.4   3. Impingement syndrome of right shoulder  M75.41 726.2   4. Right shoulder pain, unspecified chronicity  M25.511 719.41   5. Biceps tendinitis of right upper extremity  M75.21 726.12          Patient History     Row Name 23 1645 23 1551          History    Chief Complaint Burn;Joint stiffness;Muscle weakness;Pain;Tightness  - Burn;Joint stiffness;Muscle weakness;Pain;Tightness   -HP (r) patient (t)     Type of Pain Shoulder pain  -HP Shoulder pain  -HP (r) patient (t)     Date Current Problem(s) Began 01/23/23  -HP 04/28/22  -HP (r) patient (t)     Brief Description of Current Complaint Patient had a right rotator cuff repair performed on 1/23/2023.  Overall, patient states that she continues to have stiffness and pain in the right shoulder.  Patient presented with use of sling to this point.  -HP Post op rotater cuff repair  -HP (r) patient (t)     Patient/Caregiver Goals Relieve pain;Return to prior level of function;Return to work;Improve strength  -HP Relieve pain;Return to prior level of function;Return to work;Improve strength  -HP (r) patient (t)     Hand Dominance right-handed  -HP right-handed  -HP (r) patient (t)     Occupation/sports/leisure activities Hairdresser/patient relations at   -HP Hairdresser  -HP (r) patient (t)     What clinical tests have you had for this problem? MRI;Other 2 (comment)  - MRI;Other 2 (comment)  -HP (r) patient (t)     Other Clinical Tests -- Surgery  -HP (r) patient (t)     Surgery/Hospitalization R RTC Repair  -HP --     Are you or can you be pregnant No  -HP No  -HP (r) patient (t)        Pain     Pain Location Shoulder  -HP --     Pain at Present 4  -HP --     Pain at Best 4  -HP --     Pain at Worst 10  -HP --     Pain Frequency Constant/continuous  -HP --     Pain Description Aching;Cramping;Discomfort;Nagging;Numbness;Pressure;Radiating;Shooting;Sharp;Sore;Tightness;Tingling  -HP --     What Performance Factors Make the Current Problem(s) WORSE? Use of the right shoulder  -HP --     Is your sleep disturbed? 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 Yes  -HP --     Difficulties with recreational activities? Yes  -HP --        Fall Risk Assessment    Any falls in the past year: No  -HP No  -HP (r) patient (t)        Services    Prior Rehab/Home Health Experiences No   -HP No  -HP (r) patient (t)     Are you currently receiving Home Health services No  -HP No  -HP (r) patient (t)     Do you plan to receive Home Health services in the near future No  -HP No  -HP (r) patient (t)        Daily Activities    Primary Language English  -HP English  -HP (r) patient (t)     Are you able to read Yes  -HP Yes  -HP (r) patient (t)     Are you able to write Yes  -HP Yes  -HP (r) patient (t)     How does patient learn best? Listening;Reading;Demonstration;Pictures/Video  -HP Listening;Reading;Demonstration;Pictures/Video  -HP (r) patient (t)     Pt Participated in POC and Goals Yes  -HP --        Safety    Are you being hurt, hit, or frightened by anyone at home or in your life? No  -HP No  -HP (r) patient (t)     Are you being neglected by a caregiver No  -HP No  -HP (r) patient (t)     Have you had any of the following issues with Depression;Anxiety  -HP Depression;Anxiety  -HP (r) patient (t)           User Key  (r) = Recorded By, (t) = Taken By, (c) = Cosigned By    Initials Name Provider Type    HP Thanh Jaffe, PT Physical Therapist    patient Vish Russo --                 PT Ortho     Row Name 03/09/23 1827       Precautions and Contraindications    Precautions R RTC Repair  -HP       Posture/Observations    Posture/Observations Comments Moderate TTP along the right rotator cuff musculature.  -HP       Quarter Clearing    Quarter Clearing Upper Quarter Clearing  -HP       Sensory Screen for Light Touch- Upper Quarter Clearing    C4 (posterior shoulder) Intact  -HP    C5 (lateral upper arm) Intact  -HP    C6 (tip of thumb) Intact  -HP    C7 (tip of 3rd finger) Intact  -HP    C8 (tip of 5th finger) Intact  -HP    T1 (medial lower arm) Intact  -HP       General ROM    RT Upper Ext Rt Shoulder ABduction;Rt Shoulder Extension;Rt Shoulder Flexion  -HP       Right Upper Ext    Rt Shoulder Abduction AROM 60  -HP    Rt Shoulder Abduction PROM 90  -HP    Rt Shoulder Extension AROM 20  -HP     Rt Shoulder Extension PROM 30  -HP    Rt Shoulder Flexion AROM 80  -HP    Rt Shoulder Flexion PROM 110  -HP       MMT (Manual Muscle Testing)    General MMT Comments Deferred formal MMT of the right shoulder at this time.  -          User Key  (r) = Recorded By, (t) = Taken By, (c) = Cosigned By    Initials Name Provider Type    Thanh Christensen, PT Physical Therapist                            Therapy Education  Education Details: HEP included: Pulleys in flexion, table slides in flexion, table slide in scaption, pendulums, scapular retraction holds  Given: HEP, Symptoms/condition management, Pain management, Posture/body mechanics  Program: New  How Provided: Verbal, Demonstration, Written  Provided to: Patient  Level of Understanding: Teach back education performed, Verbalized, Demonstrated      PT OP Goals     Row Name 03/09/23 1645          PT Short Term Goals    STG Date to Achieve 04/06/23  -HP     STG 1 Patient to report improvement of symptoms by 50% or greater.  -HP     STG 1 Progress New  -     STG 2 Patient to report adherence to HEP.  -HP     STG 2 Progress New  -     STG 3 Patient to demonstrate active range of motion of right shoulder flexion to 120 degrees.  -HP     STG 3 Progress New  -HP     STG 4 Patient to report decrease in QuickDASH score to less than or equal to 50.  -HP     STG 4 Progress New  -        Long Term Goals    LTG Date to Achieve 05/04/23  -HP     LTG 1 Patient to report improvement of symptoms by 75% or greater.  -HP     LTG 1 Progress New  -     LTG 2 Patient to report independence with HEP.  -HP     LTG 2 Progress New  -     LTG 3 Patient to demonstrate full active range of motion of the right shoulder in all planes without pain or symptoms.  -HP     LTG 3 Progress New  -     LTG 4 Patient to report decreasing QuickDASH score to less than or equal to 20.  -     LTG 4 Progress New  -        Time Calculation    PT Goal Re-Cert Due Date 06/07/23  -HP            User Key  (r) = Recorded By, (t) = Taken By, (c) = Cosigned By    Initials Name Provider Type     Thanh Jaffe, PT Physical Therapist                 PT Assessment/Plan     Row Name 03/09/23 1645          PT Assessment    Functional Limitations Limitations in functional capacity and performance;Performance in leisure activities;Performance in self-care ADL;Performance in work activities;Limitations in community activities  -     Impairments Joint integrity;Joint mobility;Motor function;Muscle strength;Pain;Posture;Range of motion  -     Assessment Comments Patient is a 57-year-old female who presents with a low complex and evolving case of a right rotator cuff repair.  Patient had a rotator cuff repair performed on 1/23/2023.  Patient presented to today's session with continued sling use.  Patient was educated on slowly weaning out of the sling over the next few days.  Overall, patient is doing well to this point but is limited in her active range of motion the greatest.  -HP     Please refer to paper survey for additional self-reported information Yes  -HP     Rehab Potential Good  -HP     Patient/caregiver participated in establishment of treatment plan and goals Yes  -HP     Patient would benefit from skilled therapy intervention Yes  -HP        PT Plan    PT Frequency 1x/week;2x/week  -     Predicted Duration of Therapy Intervention (PT) 14-16 visits  -     Planned CPT's? PT EVAL LOW COMPLEXITY: 25748;PT THER PROC EA 15 MIN: 24366;PT THER ACT EA 15 MIN: 20876;PT MANUAL THERAPY EA 15 MIN: 69658;PT NEUROMUSC RE-EDUCATION EA 15 MIN: 68878  -     Physical Therapy Interventions (Optional Details) gross motor skills;home exercise program;joint mobilization;manual therapy techniques;modalities;motor coordination training;neuromuscular re-education;patient/family education;postural re-education;ROM (Range of Motion);strengthening;stretching  -     PT Plan Comments Patient to benefit from further skilled  therapy with a focus on improving active range of motion, improving strength, and improving overall functional mobility.  -HP           User Key  (r) = Recorded By, (t) = Taken By, (c) = Cosigned By    Initials Name Provider Type    HP Thanh Jaffe, PT Physical Therapist                                    Outcome Measure Options: Quick DASH  Quick DASH  Open a tight or new jar.: Unable  Do heavy household chores (e.g., wash walls, wash floors): Unable  Carry a shopping bag or briefcase: Unable  Wash your back: Unable  Use a knife to cut food: Moderate Difficulty  Recreational activities in which you take some force or impact through your arm, should or hand (e.g. golf, hammering, tennis, etc.): Unable  During the past week, to what extent has your arm, shoulder, or hand problem interfered with your normal social activites with family, friends, neighbors or groups?: Extremely  During the past week, were you limited in your work or other regular daily activities as a result of your arm, shoulder or hand problem?: Unable  Arm, Shoulder, or hand pain: Severe  Tingling (pins and needles) in your arm, shoulder, or hand: Moderate  During the past week, how much difficulty have you had sleeping because of the pain in your arm, shoulder or hand?: Severe Difficulty  Number of Questions Answered: 11  Quick DASH Score: 86.36         Time Calculation:     Start Time: 1645  Untimed Charges  PT Eval/Re-eval Minutes: 60  Total Minutes  Untimed Charges Total Minutes: 60   Total Minutes: 60     Therapy Charges for Today     Code Description Service Date Service Provider Modifiers Qty    [id number]  PT EVAL LOW COMPLEXITY 4 3/9/2023 Thanh Jaffe, PT GP 1          PT G-Codes  Outcome Measure Options: Quick DASH  Quick DASH Score: 86.36         Thanh Jaffe PT  3/9/2023

## 2023-04-11 ENCOUNTER — OFFICE VISIT (OUTPATIENT)
Dept: ORTHOPEDIC SURGERY | Facility: CLINIC | Age: 58
End: 2023-04-11
Payer: COMMERCIAL

## 2023-04-11 DIAGNOSIS — R20.0 NUMBNESS AND TINGLING OF RIGHT UPPER AND LOWER EXTREMITY: ICD-10-CM

## 2023-04-11 DIAGNOSIS — Z98.890 STATUS POST RIGHT ROTATOR CUFF REPAIR: Primary | ICD-10-CM

## 2023-04-11 DIAGNOSIS — Z72.0 TOBACCO USE: ICD-10-CM

## 2023-04-11 DIAGNOSIS — R20.2 NUMBNESS AND TINGLING OF RIGHT UPPER AND LOWER EXTREMITY: ICD-10-CM

## 2023-04-11 PROCEDURE — 99024 POSTOP FOLLOW-UP VISIT: CPT

## 2023-04-11 NOTE — PROGRESS NOTES
[facility] [place] Up       Office Follow Up Visit       Patient Name: Vish Russo    Chief Complaint:   Chief Complaint   Patient presents with   • Post-op     9 week f/u; 11 weeks s/p right shoulder arthroscopic rotator cuff repair-1/23/23       Referring Physician: No ref. provider found    History of Present Illness:   Vish Russo returns to clinic today for postop visit s/p right shoulder arthroscopy for rotator cuff repair 1/23/2023 with Dr. Song.  Now 11 weeks out from surgery.  She reports to be doing well.  Completing home exercise program.  Her friend is a physical therapist.  She is regaining range of motion at this point.  She does have some difficulty still reaching up overhead.  Rates pain a 5/10.  Taking Tylenol for the pain.  Occasionally Advil.  Her biggest complaint is pins-and-needles sensation down the right arm.    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  Working at [employer] in the meantime.      Subjective     Review of Systems   Constitutional: Negative.  Negative for chills, fatigue and fever.   HENT: Negative.  Negative for congestion and dental problem.    Eyes: Negative.  Negative for blurred vision.   Respiratory: Negative.  Negative for shortness of breath.    Cardiovascular: Negative.  Negative for leg swelling.   Gastrointestinal: Negative.  Negative for abdominal pain.   Endocrine: Negative.  Negative for polyuria.   Genitourinary: Negative.  Negative for difficulty urinating.   Musculoskeletal: Positive for arthralgias.   Skin: Negative.    Allergic/Immunologic: Negative.    Neurological: Negative.    Hematological: Negative.  Negative for adenopathy.   Psychiatric/Behavioral: Negative.  Negative for behavioral problems.        I have reviewed and updated the following portions of the patient's history and review of systems: allergies, current medications, past  family history, past medical history, past social history, past surgical history and problem list.    Medications:   Current Outpatient Medications:   •  baclofen (LIORESAL) 20 MG tablet, TAKE ONE TABLET BY MOUTH TWICE A DAY, Disp: 180 tablet, Rfl: 0  •  Brexpiprazole (Rexulti) 3 MG tablet, Take 1 tablet by mouth Daily., Disp: 30 tablet, Rfl: 1  •  diazePAM (VALIUM) 5 MG tablet, TAKE ONE TABLET BY MOUTH EVERY 12 HOURS AS NEEDED FOR ANXIETY, Disp: 60 tablet, Rfl: 2  •  doxepin (SINEquan) 10 MG capsule, Take 1-2 tablets PO QHS PRN for sleep, Disp: 60 capsule, Rfl: 1  •  estradiol (ESTRACE VAGINAL) 0.1 MG/GM vaginal cream, 1g PV 3x weekly x2 weeks, Disp: 42.5 g, Rfl: 0  •  fluticasone (FLONASE) 50 MCG/ACT nasal spray, SPRAY TWO SPRAYS IN EACH NOSTRIL ONCE DAILY, Disp: 48 mL, Rfl: 1  •  omeprazole (priLOSEC) 40 MG capsule, Take 1 capsule by mouth Daily., Disp: 30 capsule, Rfl: 2  •  ondansetron ODT (ZOFRAN-ODT) 8 MG disintegrating tablet, Take 1 tablet by mouth (dissolve under tongue or swallow) every 8 hours as needed for nausea., Disp: 30 tablet, Rfl: 5  •  Semaglutide-Weight Management (Wegovy) 0.25 MG/0.5ML solution auto-injector, Inject 0.25 mg under the skin into the appropriate area as directed 1 (One) Time Per Week., Disp: 2 mL, Rfl: 0  •  sertraline (Zoloft) 100 MG tablet, Take 1.5 tablets PO Daily, Disp: 45 tablet, Rfl: 1  •  Synthroid 75 MCG tablet, TAKE ONE TABLET BY MOUTH EVERY MORNING WHILE FASTING. WAIT ONE HOUR BEFORE EATING OR TAKING ANY OTHER MEDICATION, Disp: 30 tablet, Rfl: 5    Allergies:   Allergies   Allergen Reactions   • Imitrex [Sumatriptan] Confusion     Also caused chest pain   • Propranolol Swelling   • Zyprexa [Olanzapine] Swelling         Objective      Vital Signs: There were no vitals filed for this visit.    Ortho Exam:  General: no acute distress, comfortable  Vitals reviewed in chart    Musculoskeletal Exam:    SIDE: Right shoulder    Tenderness: None    Range of motion measurements  (degrees): 120/120/40/40  Painful arc of motion: No  Neurovascularly intact  Incisions healed  No evidence of septic joint      Results Review:  XR Shoulder 1 View Right  Imaging: shoulder x-rays 1 view - AP x-ray views    Side: RIGHT SHOULDER    Indication for shoulder x-ray 1 view: shoulder pain, postop evaluation   following surgery    Comparison: the current xray was compared to preoperative imaging and   indicates expected postoperative changes.    Findings: No acute bony pathology. Well appearing and well positioned   compared to preoperative imaging.  Located and no fracture noted.    I personally reviewed the above x-rays.       Assessment / Plan      Assessment:   Diagnoses and all orders for this visit:    1. Status post right rotator cuff repair (Primary)    2. Tobacco use    3. Numbness and tingling of right upper and lower extremity          Plan:  1. Continue with home exercise program for range of motion.  May start strengthening activity as she is almost 3 months out from surgery.  Progress slowly until comfortable with heavier weights.  2. Discussed desensitization of surgical site.  This may help with numbness/tingling she is experiencing.  May try deep tissue massage or other modalities.  We will reassess at next visit.  3. History of tobacco abuse may slow healing time. Always recommend tobacco cessation for proper surgical healing response.  4. Continue with Tylenol and/or Advil as needed for the pain and swelling.    Follow Up:   2 months    History, diagnosis and treatment plan discussed with Dr. Song.      Dari James PA-C  [facility] [place] [facility]    Dictated using Dragon Speech Recognition.

## 2023-04-20 ENCOUNTER — DOCUMENTATION (OUTPATIENT)
Dept: PHYSICAL THERAPY | Facility: HOSPITAL | Age: 58
End: 2023-04-20
Payer: COMMERCIAL

## 2023-04-20 DIAGNOSIS — S46.011A TRAUMATIC COMPLETE TEAR OF RIGHT ROTATOR CUFF, INITIAL ENCOUNTER: ICD-10-CM

## 2023-04-20 DIAGNOSIS — M25.511 RIGHT SHOULDER PAIN, UNSPECIFIED CHRONICITY: ICD-10-CM

## 2023-04-20 DIAGNOSIS — M75.41 IMPINGEMENT SYNDROME OF RIGHT SHOULDER: ICD-10-CM

## 2023-04-20 DIAGNOSIS — Z98.890 STATUS POST RIGHT ROTATOR CUFF REPAIR: Primary | ICD-10-CM

## 2023-04-20 DIAGNOSIS — M75.21 BICEPS TENDINITIS OF RIGHT UPPER EXTREMITY: ICD-10-CM

## 2023-04-20 NOTE — THERAPY DISCHARGE NOTE
Outpatient Physical Therapy Discharge Summary         Patient Name: Vish Russo  : 1965  MRN: [id number]    Today's Date: 2023    Visit Dx:    ICD-10-CM ICD-9-CM   1. Status post right rotator cuff repair  Z98.890 V45.89   2. Traumatic complete tear of right rotator cuff, initial encounter  S46.011A 840.4   3. Impingement syndrome of right shoulder  M75.41 726.2   4. Right shoulder pain, unspecified chronicity  M25.511 719.41   5. Biceps tendinitis of right upper extremity  M75.21 726.12           OP PT Discharge Summary  Date of Discharge: 23  Discharge Instructions/Additional Comments: Patient was seen for her initial evaluation and called and canceled all remaining scheduled visits due to her friend being a PT that was going to treat her in her home.  For this reason, patient to be discharged at this time.      Time Calculation:                    Thanh Jaffe, PT  2023

## 2023-04-25 ENCOUNTER — OFFICE VISIT (OUTPATIENT)
Dept: ORTHOPEDIC SURGERY | Facility: CLINIC | Age: 58
End: 2023-04-25
Payer: COMMERCIAL

## 2023-04-25 VITALS — BODY MASS INDEX: 32.12 KG/M2 | SYSTOLIC BLOOD PRESSURE: 120 MMHG | HEIGHT: 65 IN | DIASTOLIC BLOOD PRESSURE: 82 MMHG

## 2023-04-25 DIAGNOSIS — S46.011A TRAUMATIC COMPLETE TEAR OF RIGHT ROTATOR CUFF, INITIAL ENCOUNTER: ICD-10-CM

## 2023-04-25 DIAGNOSIS — M75.51 BURSITIS OF RIGHT SHOULDER: ICD-10-CM

## 2023-04-25 DIAGNOSIS — M75.41 IMPINGEMENT SYNDROME OF RIGHT SHOULDER: ICD-10-CM

## 2023-04-25 DIAGNOSIS — Z98.890 STATUS POST RIGHT ROTATOR CUFF REPAIR: ICD-10-CM

## 2023-04-25 DIAGNOSIS — M75.21 BICEPS TENDINITIS OF RIGHT UPPER EXTREMITY: ICD-10-CM

## 2023-04-25 DIAGNOSIS — M25.511 ACUTE PAIN OF RIGHT SHOULDER: Primary | ICD-10-CM

## 2023-04-25 RX ORDER — HYDROCODONE BITARTRATE AND ACETAMINOPHEN 5; 325 MG/1; MG/1
1 TABLET ORAL 2 TIMES DAILY PRN
Qty: 14 TABLET | Refills: 0 | Status: SHIPPED | OUTPATIENT
Start: 2023-04-25 | End: 2023-05-02
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
## 2023-04-27 ENCOUNTER — TELEPHONE (OUTPATIENT)
Dept: INTERNAL MEDICINE | Facility: CLINIC | Age: 58
End: 2023-04-27
Payer: COMMERCIAL

## 2023-04-27 DIAGNOSIS — F17.213 CIGARETTE NICOTINE DEPENDENCE WITH WITHDRAWAL: Primary | ICD-10-CM

## 2023-04-27 NOTE — TELEPHONE ENCOUNTER
Caller: Vish Russo    Relationship: Self    Best call back number: [phone]    What medication are you requesting: CHANTIX    What are your current symptoms:SMOKING    Have you had these symptoms before:    [x] Yes  [] No    Have you been treated for these symptoms before:   [x] Yes  [] No    If a prescription is needed, what is your preferred pharmacy and phone number: [facility] [address] - [phone] [facility] [phone]      Additional notes:

## 2023-04-28 ENCOUNTER — TELEPHONE (OUTPATIENT)
Dept: ORTHOPEDIC SURGERY | Facility: CLINIC | Age: 58
End: 2023-04-28
Payer: COMMERCIAL

## 2023-04-28 DIAGNOSIS — S46.011A TRAUMATIC COMPLETE TEAR OF RIGHT ROTATOR CUFF, INITIAL ENCOUNTER: ICD-10-CM

## 2023-04-28 DIAGNOSIS — M75.51 BURSITIS OF RIGHT SHOULDER: ICD-10-CM

## 2023-04-28 DIAGNOSIS — M75.21 BICEPS TENDINITIS OF RIGHT UPPER EXTREMITY: ICD-10-CM

## 2023-04-28 DIAGNOSIS — M75.41 IMPINGEMENT SYNDROME OF RIGHT SHOULDER: ICD-10-CM

## 2023-04-28 DIAGNOSIS — M25.511 ACUTE PAIN OF RIGHT SHOULDER: ICD-10-CM

## 2023-04-28 DIAGNOSIS — Z98.890 STATUS POST RIGHT ROTATOR CUFF REPAIR: ICD-10-CM

## 2023-04-28 RX ORDER — VARENICLINE TARTRATE 1 MG/1
1 TABLET, FILM COATED ORAL 2 TIMES DAILY
Qty: 56 TABLET | Refills: 4 | Status: SHIPPED | OUTPATIENT
Start: 2023-05-26 | End: 2023-10-13

## 2023-04-28 NOTE — TELEPHONE ENCOUNTER
Caller: Vish Russo    Relationship: Self    Best call back number: [phone]     What form or medical record are you requesting: OP REPORT FOR RIGHT SHOULDER  01/23/2023    Who is requesting this form or medical record from you:PATIENT     How would you like to receive the form or medical records (pick-up, mail, fax): FAX   If fax, what is the fax number: [phone]    Timeframe paperwork needed: ASAP

## 2023-05-02 ENCOUNTER — OFFICE VISIT (OUTPATIENT)
Dept: ORTHOPEDIC SURGERY | Facility: CLINIC | Age: 58
End: 2023-05-02
Payer: COMMERCIAL

## 2023-05-02 VITALS
DIASTOLIC BLOOD PRESSURE: 86 MMHG | WEIGHT: 192.9 LBS | BODY MASS INDEX: 32.14 KG/M2 | SYSTOLIC BLOOD PRESSURE: 126 MMHG | HEIGHT: 65 IN

## 2023-05-02 DIAGNOSIS — M75.51 BURSITIS OF RIGHT SHOULDER: ICD-10-CM

## 2023-05-02 DIAGNOSIS — S46.011A TRAUMATIC COMPLETE TEAR OF RIGHT ROTATOR CUFF, INITIAL ENCOUNTER: ICD-10-CM

## 2023-05-02 DIAGNOSIS — M25.511 ACUTE PAIN OF RIGHT SHOULDER: Primary | ICD-10-CM

## 2023-05-02 DIAGNOSIS — M75.41 IMPINGEMENT SYNDROME OF RIGHT SHOULDER: ICD-10-CM

## 2023-05-02 DIAGNOSIS — M75.21 BICEPS TENDINITIS OF RIGHT UPPER EXTREMITY: ICD-10-CM

## 2023-05-02 DIAGNOSIS — Z98.890 STATUS POST RIGHT ROTATOR CUFF REPAIR: ICD-10-CM
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
## 2023-05-04 ENCOUNTER — TELEPHONE (OUTPATIENT)
Dept: ORTHOPEDIC SURGERY | Facility: CLINIC | Age: 58
End: 2023-05-04
Payer: COMMERCIAL

## 2023-05-04 NOTE — TELEPHONE ENCOUNTER
Caller: DR. NAITON    Relationship: SELF    Best call back number: [phone]    What form or medical record are you requesting: PAPERWORK SHOWING THAT SURGERY DATE HAD BEEN MOVED. PATIENT IS ALSO REQUESTING SUMMARY OF LAST VISIT (05/02/23) AND READING OF THE MRI.    Who is requesting this form or medical record from you: EMPLOYER/SUPERVISOR & PATIENT    How would you like to receive the form or medical records (pick-up, mail, fax): FAX  If fax, what is the fax number: [phone]     Timeframe paperwork needed: ASAP    Additional notes: N/A
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
## 2023-05-05 NOTE — TELEPHONE ENCOUNTER
I called patient and let her know I faxed the paperwork.  She stated that she does not need the MRI report.  Daphne

## 2023-05-16 DIAGNOSIS — F41.8 DEPRESSION WITH ANXIETY: ICD-10-CM

## 2023-05-17 RX ORDER — DIAZEPAM 5 MG/1
5 TABLET ORAL EVERY 12 HOURS PRN
Qty: 60 TABLET | Refills: 2 | Status: SHIPPED | OUTPATIENT
Start: 2023-05-17

## 2023-05-22 ENCOUNTER — DOCUMENTATION (OUTPATIENT)
Dept: ORTHOPEDIC SURGERY | Facility: CLINIC | Age: 58
End: 2023-05-22

## 2023-05-22 DIAGNOSIS — S46.011A TRAUMATIC COMPLETE TEAR OF RIGHT ROTATOR CUFF, INITIAL ENCOUNTER: ICD-10-CM

## 2023-05-22 DIAGNOSIS — M75.21 BICEPS TENDINITIS OF RIGHT UPPER EXTREMITY: ICD-10-CM

## 2023-05-22 DIAGNOSIS — Z98.890 STATUS POST RIGHT ROTATOR CUFF REPAIR: ICD-10-CM

## 2023-05-22 DIAGNOSIS — M25.511 ACUTE PAIN OF RIGHT SHOULDER: Primary | ICD-10-CM

## 2023-05-22 RX ORDER — HYDROCODONE BITARTRATE AND ACETAMINOPHEN 10; 325 MG/1; MG/1
1 TABLET ORAL EVERY 4 HOURS PRN
Qty: 42 TABLET | Refills: 0 | Status: SHIPPED | OUTPATIENT
Start: 2023-05-22 | End: 2023-05-29

## 2023-05-22 RX ORDER — ONDANSETRON 4 MG/1
4 TABLET, FILM COATED ORAL EVERY 6 HOURS PRN
Qty: 30 TABLET | Refills: 1 | Status: SHIPPED | OUTPATIENT
Start: 2023-05-22 | End: 2023-05-30

## 2023-05-22 NOTE — PROGRESS NOTES
Operative Report     Side/SHOULDER: Right shoulder    LOCATION: [facility]  [address] [place] [address]    [facility] OPERATIVE REPORT       Surgeon: Ismael Song MD     Assistant: SHANAE Best     The skilled assistance of the above noted first assistant was necessary during this complex surgical procedure.  The surgical assistant assisted with every aspect of the operation including, but not limited to, proper and safe positioning of the patient, obtaining adequate surgical exposure, manipulation of surgical instruments, suture management, surgical knot tying when necessary, the continual process of hemostasis during the procedure itself in addition to surgical wound closure and removal of the patient from the operating table and returning the patient back to the hospital [facility].  The assistance of the surgical assistant allowed me to perform the most sensitive and technical potions of this operation using 2 hands, thus enhancing efficiency and patient safety.  This would not be possible without the help of a skilled assistant familiar with the procedure and capable of safely performing the aforementioned tasks.      Date of surgery 5/22/2023  Right shoulder  Preoperative Diagnosis:  1.       Acute rotator cuff tear in setting of prior arthroscopic repair- treated with arthroscopic rotator cuff repair - CPT 46367    Postoperative Diagnosis:  1.     SAME as preoperative diagnoses     Procedure:  1.       Right shoulder revision arthroscopic rotator cuff repair (2x2) - CPT 71155     Admission status: elective outpatient surgery     Complications: None     EBL: 15mL     Specimen: Prior arthroscopic sutures were removed     Anesthesia: general anesthesia     Block: regional interscalene block with single shot per anesthesia     Antibiotics: weight based IV antibiotics infused prior to incision     Time out: Time out was called and the patient, side,  site, and intended procedures were confirmed.     Counts: Needle and sponge counts were correct prior to closure.     DVT prophylaxis: The patient will be weight bearing as tolerated on bilateral lower extremities postoperatively with no additional chemical DVT prophylaxis indicated.     Marked: The patient was marked with an indelible marker in the preoperative holding area confirming the correct side and site.     History & Physical:   A history and physical examination was completed and updated in the preoperative holding area.     Consent: The patient signed the consent form for surgery with an understanding of the risks and benefits as outlined in the clinic and in the preoperative holding area.     Risks and Benefits:   I counseled the patient at length in the clinic about the complexity of revision rotator cuff repair.  We talked about a possible biological augment which was ultimately not indicated as we were able to achieve tendon back down to bone with regards to greater tuberosity.  We talked about recurrent tearing or lack of healing in the setting of complex intrinsic tissue deficiencies, prior stellate tearing, chronic dysplastic features.    Patient understood some intrinsic deficiencies at the time of the index surgery and anticipated intrinsic deficiencies of the rotator cuff tissue today.  Fortunately we were able to get a repair down to the bone.  The more medial tissue/medial to the suture row was actually well-appearing so I did not feel that an overlying bursal sided augment or patch would offer much by way of enhancing intrinsic strength of the tissue.    Indications for surgery:  She understood she had chronic prior tearing.  She presented initially in July 2022 and had about 2-1/2 months of symptoms prior to that.  Due to patient circumstances and smoking cessation due to insurance at the time her surgery was rescheduled.  She ultimately desired to proceed with surgery 1/23/2023.    At the  time of surgery she was noted to have chronic tears with dysplastic features, stellate pattern along with crescent pattern.  It was primarily supraspinatus but did extend posteriorly as well.    It appears today that the posterior portion held but the anterior portion had recurrent tearing.  She did have some synovitis and some posttraumatic stiffness.  She was doing incredibly well.  She had to cease physical therapy with the [facility] therapist and was making a transition and doing a lot of the therapy on her own but overall seem to be doing well.  She reached to grab something and upon grabbing the item she felt immediate pain and subsequent loss of active range of motion.  She had excellent motion prior to that then and was effectively pseudoparalytic.    Patient understands the complexities of the chronic dysplastic features that were noted at her initial surgery.    At the time of surgery today she did have some mild superior changes on the articular surface of the humeral head.  She had some synovitis and some evidence of posttraumatic stiffness.  She also had tearing of the CA ligament that was noted and was debrided as well.       Operative Findings:  Rotator Cuff Tissue Quality: Chronic dysplastic features as noted prior with a stellate pattern noted prior and noted again today with more medialized tearing today.     Status of the Rotator Cuff:  Supraspinatus - full-thickness tearing with some posterior extension     Significant tearing of the supraspinatus with medial extension.    I did work to achieve increased mobility of the tissue with releases.  She did however still have some contractility to the tissue/lack of mobility.  I was able to get the tissue down to the greater tuberosity.   Rotator Cuff Repair Construct:  2x2 Transosseous Equivalent Double Row Arthroscopic Rotator Cuff Repair      Rotator Cuff Implants:  Medial Row: 2 Medial Anchors - [employer] 4.5mm Healicoil PEEK -all 8 suture  limbs were utilized and tied the medial row for all limbs.  Lateral Row: 2 Lateral Anchors - [employer] 5.5mm Footprint PEEK      Bone Quality: Good bone quality     Labrum: Some degenerative changes noted biceps was intact     Humeral Head: Mild superior change to the articular surface  Glenoid: Intact     Contracture: Mild posttraumatic stiffness is noted  Pathological laxity: Negative     Procedure in detail:  Interscalene block was completed by anesthesia then transition to the OR.  Underwent general anesthesia then placed in a modified beachchair position with all bony prominences well-padded and the neck secured neutral alignment.    I examined the shoulder with examination under anesthesia and she did have some posttraumatic stiffness as noted.  This improved with releases completed arthroscopically.    The right shoulder was cleaned with alcohol sterilely prepped and draped.  Placed the arm in a well-padded arm bonilla and then draped out.  Used additional prep stick.    I utilized the prior posterior portal, anterior portal, posterior lateral portal created and new anterolateral portal.  Also used prior accessory portal to place the posterior medial row anchor.    The subscapularis was closely scrutinized within the joint well-appearing she did have some synovitis but the biceps was noted to be well-appearing with no tearing.  I transition to the subacromial space the posterior portion of the prior repair was noted to be intact the anterior portion of the supraspinatus was noted to be fully torn and medialized.  The prior sutures were removed.  The biceps was well-appearing and biceps sparing surgery was performed.  I mobilized the rotator cuff tissue and it was noted to have some side-to-side tearing as well so I placed a single ultra braid suture for margin convergence suture.  I then placed 2 Medial Row anchors.  I passed all 4 suture limbs from each anchor in an inverted mattress fashion.  I  sequentially tied the medial row from posterior to anterior to approximate the tissue down to the footprint.    I then utilized to lateral row anchors in a knotless footprint pattern.  I utilized all 8 suture limbs.    The rotator cuff tear was tested dynamically and noted to be stable. The arthroscopic instruments removed along with the arthroscopic fluid. The incisions were closed with nylon suture and steri-strips were placed over the incisions.  A sterile dressing was applied followed by a neutral rotation sling.  The patient tolerated the procedure well and was transported to the recovery room in satisfactory condition.          Rotator Cuff Repair - POSTOPERATIVE PLAN:  Follow-up in the office in 2 weeks with 1 view of the operative shoulder at that time  Sutures: Nylon sutures to come out in 2 weeks  DVT prophylaxis: No additional chemical DVT prophylaxis indicated  Weightbearing: Nonweightbearing on operative extremity  Sling for 3 to 4 weeks following the surgery  Physical therapy: Formal outpatient physical therapy will be provided at the 2-week appointment in the office.  Rotator cuff repair protocol    Electronically signed by Ismael Song MD, 05/22/23, 2:12 PM EDT.

## 2023-05-31 ENCOUNTER — TELEPHONE (OUTPATIENT)
Dept: ORTHOPEDIC SURGERY | Facility: CLINIC | Age: 58
End: 2023-05-31

## 2023-05-31 DIAGNOSIS — Z98.890 STATUS POST RIGHT ROTATOR CUFF REPAIR: ICD-10-CM

## 2023-05-31 DIAGNOSIS — S46.011A TRAUMATIC COMPLETE TEAR OF RIGHT ROTATOR CUFF, INITIAL ENCOUNTER: ICD-10-CM

## 2023-05-31 DIAGNOSIS — M25.511 ACUTE PAIN OF RIGHT SHOULDER: Primary | ICD-10-CM

## 2023-05-31 DIAGNOSIS — M75.21 BICEPS TENDINITIS OF RIGHT UPPER EXTREMITY: ICD-10-CM

## 2023-05-31 RX ORDER — HYDROCODONE BITARTRATE AND ACETAMINOPHEN 10; 325 MG/1; MG/1
1 TABLET ORAL EVERY 4 HOURS PRN
Qty: 42 TABLET | Refills: 0 | Status: SHIPPED | OUTPATIENT
Start: 2023-05-31 | End: 2023-06-07

## 2023-05-31 RX ORDER — ONDANSETRON 4 MG/1
4 TABLET, FILM COATED ORAL EVERY 6 HOURS PRN
Qty: 40 TABLET | Refills: 0 | Status: SHIPPED | OUTPATIENT
Start: 2023-05-31 | End: 2023-06-10

## 2023-05-31 NOTE — TELEPHONE ENCOUNTER
Called patient. She has enough medication to last until tomorrow. Pharmacy on file is correct. Will you refill?    Kelly

## 2023-05-31 NOTE — TELEPHONE ENCOUNTER
PATIENT CALLED IN REQUESTING REFILL ON HYDROCODONE 10 MG, AND ZOFRAN 4 MG. SHE HAD SX WITH DR. NATION ON THE 22ND ON HER RIGHT SHOULDER.     [facility]: [facility]    CALL BACK # [phone]

## 2023-06-06 ENCOUNTER — OFFICE VISIT (OUTPATIENT)
Dept: ORTHOPEDIC SURGERY | Facility: CLINIC | Age: 58
End: 2023-06-06
Payer: COMMERCIAL

## 2023-06-06 VITALS — TEMPERATURE: 96.8 F

## 2023-06-06 DIAGNOSIS — M25.511 ACUTE PAIN OF RIGHT SHOULDER: Primary | ICD-10-CM

## 2023-06-06 DIAGNOSIS — Z98.890 STATUS POST RIGHT ROTATOR CUFF REPAIR: ICD-10-CM

## 2023-06-06 DIAGNOSIS — Z98.890 S/P ARTHROSCOPY OF RIGHT SHOULDER: ICD-10-CM

## 2023-06-06 PROCEDURE — 99024 POSTOP FOLLOW-UP VISIT: CPT

## 2023-06-06 NOTE — PROGRESS NOTES
[facility] Orthopaedic Surgery Office Follow Up       Office Follow Up Visit       Patient Name: Vish Russo    Chief Complaint:   Chief Complaint   Patient presents with   • Post-op     2 weeks status post Right shoulder revision arthroscopic rotator cuff repair 5/22/23        Referring Physician: No ref. provider found    History of Present Illness:   Vish Russo returns to clinic today for 2-week postop visit s/p right shoulder  arthroscopy for revision rotator cuff repair 5/22/2023 with Dr. Song.  Initial rotator cuff surgery 1/23/2023.  Acute injury on 4/23/2023.  She was moving a bag and heard a popping sound within right shoulder.     She reports increased pain compared to previous surgery.  She is taking anti-inflammatories and hydrocodone every 5-6 hours.  She has been compliant with sling use.    Procedure:  1.       Right shoulder revision arthroscopic rotator cuff repair (2x2) - CPT 08858      Subjective     Review of Systems   Constitutional: Negative.  Negative for chills, fatigue and fever.   HENT: Negative.  Negative for congestion and dental problem.    Eyes: Negative.  Negative for blurred vision.   Respiratory: Negative.  Negative for shortness of breath.    Cardiovascular: Negative.  Negative for leg swelling.   Gastrointestinal: Negative.  Negative for abdominal pain.   Endocrine: Negative.  Negative for polyuria.   Genitourinary: Negative.  Negative for difficulty urinating.   Musculoskeletal:  Positive for arthralgias.   Skin: Negative.    Allergic/Immunologic: Negative.    Neurological: Negative.    Hematological: Negative.  Negative for adenopathy.   Psychiatric/Behavioral: Negative.  Negative for behavioral problems.       I have reviewed and updated the following portions of the patient's history and review of systems: allergies, current medications, past family history, past medical history, past social history, past  surgical history and problem list.    Medications:   Current Outpatient Medications:   •  baclofen (LIORESAL) 20 MG tablet, TAKE ONE TABLET BY MOUTH TWICE A DAY, Disp: 180 tablet, Rfl: 0  •  Brexpiprazole (Rexulti) 3 MG tablet, Take 1 tablet by mouth Daily., Disp: 30 tablet, Rfl: 1  •  diazePAM (VALIUM) 5 MG tablet, Take 1 tablet by mouth Every 12 (Twelve) Hours As Needed for Anxiety., Disp: 60 tablet, Rfl: 2  •  doxepin (SINEquan) 10 MG capsule, Take 1-2 tablets PO QHS PRN for sleep, Disp: 60 capsule, Rfl: 1  •  estradiol (ESTRACE VAGINAL) 0.1 MG/GM vaginal cream, 1g PV 3x weekly x2 weeks, Disp: 42.5 g, Rfl: 0  •  fluticasone (FLONASE) 50 MCG/ACT nasal spray, SPRAY TWO SPRAYS IN EACH NOSTRIL ONCE DAILY, Disp: 48 mL, Rfl: 1  •  HYDROcodone-acetaminophen (NORCO)  MG per tablet, Take 1 tablet by mouth Every 4 (Four) Hours As Needed for Moderate Pain or Severe Pain for up to 7 days., Disp: 42 tablet, Rfl: 0  •  omeprazole (priLOSEC) 40 MG capsule, Take 1 capsule by mouth Daily., Disp: 30 capsule, Rfl: 2  •  ondansetron (Zofran) 4 MG tablet, Take 1 tablet by mouth Every 6 (Six) Hours As Needed for Nausea or Vomiting for up to 10 days., Disp: 40 tablet, Rfl: 0  •  Semaglutide-Weight Management (Wegovy) 0.25 MG/0.5ML solution auto-injector, Inject 0.25 mg under the skin into the appropriate area as directed 1 (One) Time Per Week., Disp: 2 mL, Rfl: 0  •  sertraline (Zoloft) 100 MG tablet, Take 1.5 tablets PO Daily, Disp: 45 tablet, Rfl: 1  •  Synthroid 75 MCG tablet, TAKE ONE TABLET BY MOUTH EVERY MORNING WHILE FASTING. WAIT ONE HOUR BEFORE EATING OR TAKING ANY OTHER MEDICATION, Disp: 30 tablet, Rfl: 5  •  varenicline (CHANTIX) 1 MG tablet, Take 1 tablet by mouth 2 (Two) Times a Day for 140 days., Disp: 56 tablet, Rfl: 4    Allergies:   Allergies   Allergen Reactions   • Imitrex [Sumatriptan] Confusion     Also caused chest pain   • Propranolol Swelling   • Zyprexa [Olanzapine] Swelling         Objective      Vital  Signs:   Vitals:    06/06/23 1057   Temp: 96.8 °F (36 °C)       Ortho Exam:  General: comfortable  Vascular: 2+ radial pulse  Neurologic: sensation to light touch is intact distally, elbow flexion/elbow extension/wrist flexion/wrist extension/hand intrinsics intact, able to fire deltoid; no residual defects noted from the block  Dermatologic: surgical incisions are well appearing, with no drainage or surrounding erythema; no signs or symptoms of infection or DVT      Results Review:  XR Shoulder 1 View Right  Imaging: shoulder x-rays 1 view - AP x-ray views    Side: Right shoulder    Indication for shoulder x-ray 1 view: shoulder pain, postop evaluation   following surgery    Comparison: the current xray was compared to prior postoperative imaging   and indicates expected postoperative changes.    Findings: Right shoulder 1 view postop shows no acute bony pathology   compared to her prior imaging.  Stable findings compared to prior.    I personally reviewed the above x-rays.       MRI Shoulder Right Without Contrast    Result Date: 4/28/2023  Postoperative changes from prior rotator cuff repair with near complete retracted tear of the supraspinatus which with only a few remaining posterior fibers that are severely heterogeneous. Severe insertional subscapularis tendinosis with intrasubstance tearing at the myotendinous junction associated muscle strain. Subscapularis and intra-articular long head biceps tendinosis as described. Mild to moderate glenohumeral and moderate to severe AC joint osteoarthrosis. Serpiginous T2 bright signal within the cranial aspect of the humeral head possibly representing focus of avascular necrosis. No appreciable overlying collapse. Circumferential labral degeneration and fraying with mild glenohumeral and moderate acromioclavicular osteoarthrosis and narrowing of the subacromial space. Severe heterogeneity and thickening of the axillary pouch. No rotator interval edema or fat which is  typically seen in combination with the axillary pouch thickening in the clinical entity of adhesive capsulitis. CRITICAL RESULT:   No. COMMUNICATION: Per this written report. By electronically signing this report, I, the attending physician, attest that I have personally reviewed the images/data for the above examination(s) and agree with the final edited report. Drafted by Pedro Gallagher MD on 4/28/2023 10:07 AM Final report signed by Gerarod Dockery MD on 4/28/2023 12:04 PM        Assessment / Plan      Assessment:   Diagnoses and all orders for this visit:    1. Acute pain of right shoulder (Primary)    2. Status post right rotator cuff repair  -     Ambulatory Referral to Physical Therapy Evaluate and treat, Ortho    3. S/P arthroscopy of right shoulder  -     XR Shoulder 1 View Right  -     Ambulatory Referral to Physical Therapy Evaluate and treat, Ortho         Plan:  S/p right shoulder arthroscopy for revision rotator cuff repair.  Initially repair 1/23/2023.  Acute injury in April resulting in subsequent rotator cuff tear.    She is currently taking anti-inflammatories and hydrocodone for the pain.  Recommend weaning off of pain medications as able to.  Patient will continue with the sling for another 2 weeks.  Remain nonweightbearing on operative side.  Recommend avoiding lifting with the operative side until 3 months from surgery.  To start with physical therapy.  I have given a PT prescription as well as 2 copies of the protocol.  She would like to go to .  Recommend [facility].  Cautioned to take it slow with rehab.  She understands importance of allowing rotator cuff to heal.  Plan to return in 2 months.    We reviewed the intraoperative photographs together.  Sutures out today.       History, diagnosis and treatment plan discussed with Dr. Song.      Follow Up:   2 months        Dari James PA-C  [facility]    Dictated using Dragon Speech Recognition.

## 2023-06-12 DIAGNOSIS — E04.0 GOITER DIFFUSE, NONTOXIC: ICD-10-CM

## 2023-06-12 RX ORDER — LEVOTHYROXINE SODIUM 75 MCG
TABLET ORAL
Qty: 30 TABLET | Refills: 0 | Status: SHIPPED | OUTPATIENT
Start: 2023-06-12

## 2023-06-12 NOTE — TELEPHONE ENCOUNTER
Caller: Rafaela Vish REDDING    Relationship: Self    Best call back number: [phone]     Requested Prescriptions:   Requested Prescriptions     Pending Prescriptions Disp Refills    Synthroid 75 MCG tablet 30 tablet 5     Sig: TAKE ONE TABLET BY MOUTH EVERY MORNING WHILE FASTING. WAIT ONE HOUR BEFORE EATING OR TAKING ANY OTHER MEDICATION     doxepin (SINEquan) 10 MG capsule      Pharmacy where request should be sent: [place] [id number] - [phone] [facility] [phone] FX     Last office visit with prescribing clinician: 5/11/2022   Last telemedicine visit with prescribing clinician: Visit date not found   Next office visit with prescribing clinician: Visit date not found     Additional details provided by patient: OUT OF Synthroid 75 MCG tablet. PATIENT STATES DR. ENRIQUE RAMSEY ORIGINALLY PRESCRIBED THE doxepin (SINEquan) 10 MG capsule  BUT IS NO LONGER SEEING HER AND WOULD LIKE TO ASK THAT PCP TAKE OVER MEDICATION. PATIENT STATES SHE TAKES 20 MG OF DOXEPIN.    Does the patient have less than a 3 day supply:  [x] Yes  [] No    Would you like a call back once the refill request has been completed: [] Yes [x] No    If the office needs to give you a call back, can they leave a voicemail: [] Yes [x] No    Gerald Wray Rep   06/12/23 13:32 EDT

## 2023-06-13 ENCOUNTER — TELEPHONE (OUTPATIENT)
Dept: ORTHOPEDIC SURGERY | Facility: CLINIC | Age: 58
End: 2023-06-13
Payer: COMMERCIAL

## 2023-06-13 DIAGNOSIS — Z98.890 S/P ARTHROSCOPY OF RIGHT SHOULDER: ICD-10-CM

## 2023-06-13 DIAGNOSIS — M25.511 ACUTE PAIN OF RIGHT SHOULDER: Primary | ICD-10-CM

## 2023-06-13 RX ORDER — HYDROCODONE BITARTRATE AND ACETAMINOPHEN 10; 325 MG/1; MG/1
1 TABLET ORAL EVERY 6 HOURS PRN
Qty: 28 TABLET | Refills: 0 | Status: SHIPPED | OUTPATIENT
Start: 2023-06-13 | End: 2023-06-20

## 2023-06-13 NOTE — TELEPHONE ENCOUNTER
Called patient and let her know that a refill had been sent to pharmacy, advised her that she needs to be weaning off the narcotics at this time. She understood.     Kelly

## 2023-06-13 NOTE — TELEPHONE ENCOUNTER
Will you refill Alto 10/325 for patient? Pharmacy on file is correct. Last fill was 05/31/2023 for 42 pills.    Kelly

## 2023-06-13 NOTE — TELEPHONE ENCOUNTER
HYDROcodone-acetaminophen (NORCO)  MG per tablet     Patient called in asking for a refill to be sent in for this medication.  I confirmed the pharmacy on file if there are any questions or concerns please contact the patient.

## 2023-08-08 ENCOUNTER — OFFICE VISIT (OUTPATIENT)
Dept: ORTHOPEDIC SURGERY | Facility: CLINIC | Age: 58
End: 2023-08-08
Payer: COMMERCIAL

## 2023-08-08 DIAGNOSIS — Z98.890 STATUS POST RIGHT ROTATOR CUFF REPAIR: Primary | ICD-10-CM

## 2023-08-08 NOTE — PROGRESS NOTES
[facility] Office Follow Up       Office Follow Up Visit       Patient Name: Vish Russo    Chief Complaint:   Chief Complaint   Patient presents with    Post-op     2 month follow up; 12 weeks status post Right shoulder revision arthroscopic rotator cuff repair 5/22/23       Referring Physician: No ref. provider found    History of Present Illness:   Vish Russo returns to clinic today for postop visit 10 weeks s/p right shoulder arthroscopy for revision rotator cuff repair 5/22/2023 with Dr. Song.  She reports to doing well.  She has been completing physical therapy with  PT.  She feels her range of motion has greatly improved.  She has been very cautious avoiding any lifting activity.  She has had some difficulty getting in with the therapist recently.  She hopes to have more visits towards the end of the month when she starts strengthening.    Procedure:  1.       Right shoulder revision arthroscopic rotator cuff repair (2x2) - CPT 32813         Subjective     Review of Systems   Constitutional: Negative.    HENT: Negative.     Eyes: Negative.    Respiratory: Negative.     Cardiovascular: Negative.    Gastrointestinal: Negative.    Endocrine: Negative.    Genitourinary: Negative.    Musculoskeletal:  Positive for arthralgias.   Skin: Negative.    Allergic/Immunologic: Negative.    Neurological: Negative.    Hematological: Negative.    Psychiatric/Behavioral: Negative.        I have reviewed and updated the following portions of the patient's history and review of systems: allergies, current medications, past family history, past medical history, past social history, past surgical history and problem list.    Medications:   Current Outpatient Medications:     diazePAM (VALIUM) 5 MG tablet, Take 1 tablet by mouth Every 12 (Twelve) Hours As Needed for Anxiety., Disp: 60 tablet, Rfl: 2    doxepin (SINEquan) 10 MG capsule,  Take 1-2 tablets PO QHS PRN for sleep, Disp: 60 capsule, Rfl: 5    fluticasone (FLONASE) 50 MCG/ACT nasal spray, SPRAY TWO SPRAYS IN EACH NOSTRIL ONCE DAILY, Disp: 48 mL, Rfl: 1    Semaglutide-Weight Management (Wegovy) 0.25 MG/0.5ML solution auto-injector, Inject 0.25 mg under the skin into the appropriate area as directed 1 (One) Time Per Week. (Patient not taking: Reported on 8/8/2023), Disp: 2 mL, Rfl: 0    Synthroid 75 MCG tablet, TAKE 1 TABLET BY MOUTH EVERY MORNING WHILE FASTING. WAIT ONE HOUR BEFORE EATING OR TAKING ANY OTHER MEDICATION, Disp: 30 tablet, Rfl: 2    Allergies:   Allergies   Allergen Reactions    Imitrex [Sumatriptan] Confusion     Also caused chest pain    Propranolol Swelling    Zyprexa [Olanzapine] Swelling         Objective      Vital Signs: There were no vitals filed for this visit.    Ortho Exam:  General: no acute distress, comfortable  Vitals reviewed in chart    Musculoskeletal Exam:    SIDE: Right shoulder  Incisions well healed    Range of motion measurements (degrees): 160/160/30/30  Painful arc of motion: No  Negative lag sign  Jobes testing strong  No evidence of septic joint      Results Review:  XR Shoulder 1 View Right  Imaging: shoulder x-rays 1 view - AP x-ray views    Side: Right shoulder    Indication for shoulder x-ray 1 view: shoulder pain, postop evaluation   following surgery    Comparison: the current xray was compared to prior postoperative imaging   and indicates expected postoperative changes.    Findings: Right shoulder 1 view postop shows no acute bony pathology   compared to her prior imaging.  Stable findings compared to prior.    I personally reviewed the above x-rays.           Assessment / Plan      Assessment:   Diagnoses and all orders for this visit:    1. Status post right rotator cuff repair (Primary)          Plan:  Doing well 10 weeks s/p revision RCR. Continue with PT at . Working with [facility]. She has had some difficulty getting appointments as of  late but will continue with exercises at home in addition to formal PT.  May start gradual strengthening in another couple weeks. She understands importance of extra precautions given revision surgery.  Recommend anti-inflammatories and icing for pain.    Follow Up:   2-3 months    History, diagnosis and treatment plan discussed with Dr. Song.        Dari James PA-C  [facility]    Dictated using Dragon Speech Recognition.

## 2023-08-09 ENCOUNTER — TELEPHONE (OUTPATIENT)
Dept: INTERNAL MEDICINE | Facility: CLINIC | Age: 58
End: 2023-08-09
Payer: COMMERCIAL

## 2023-08-09 DIAGNOSIS — E04.0 GOITER DIFFUSE, NONTOXIC: ICD-10-CM

## 2023-08-09 RX ORDER — LEVOTHYROXINE SODIUM 75 MCG
TABLET ORAL
Qty: 30 TABLET | Refills: 2 | Status: SHIPPED | OUTPATIENT
Start: 2023-08-09

## 2023-08-14 ENCOUNTER — HOSPITAL ENCOUNTER (OUTPATIENT)
Dept: MAMMOGRAPHY | Facility: HOSPITAL | Age: 58
Discharge: HOME | End: 2023-08-14
Admitting: INTERNAL MEDICINE
Payer: COMMERCIAL

## 2023-08-14 PROCEDURE — 77063 BREAST TOMOSYNTHESIS BI: CPT

## 2023-08-14 PROCEDURE — 77067 SCR MAMMO BI INCL CAD: CPT

## 2023-09-15 DIAGNOSIS — F41.8 DEPRESSION WITH ANXIETY: ICD-10-CM

## 2023-09-15 RX ORDER — DIAZEPAM 5 MG/1
5 TABLET ORAL EVERY 12 HOURS PRN
Qty: 60 TABLET | Refills: 2 | OUTPATIENT
Start: 2023-09-15

## 2023-09-15 RX ORDER — DIAZEPAM 5 MG/1
TABLET ORAL
Qty: 60 TABLET | Refills: 0 | Status: SHIPPED | OUTPATIENT
Start: 2023-09-15

## 2023-09-15 NOTE — TELEPHONE ENCOUNTER
Would you be able to send this in for pt since Dr. REDDING is out today? Pharmacy was supposed to request it for her the other day and never did, she has been out for 3 days.

## 2023-09-15 NOTE — TELEPHONE ENCOUNTER
Caller: Vish Russo    Relationship: Self    Best call back number: [phone]     Requested Prescriptions:   Requested Prescriptions     Pending Prescriptions Disp Refills    diazePAM (VALIUM) 5 MG tablet 60 tablet 2     Sig: Take 1 tablet by mouth Every 12 (Twelve) Hours As Needed for Anxiety.        Pharmacy where request should be sent: [address] - [phone] [facility] [phone] FX     Last office visit with prescribing clinician: 6/21/2023   Last telemedicine visit with prescribing clinician: Visit date not found   Next office visit with prescribing clinician: Visit date not found     Additional details provided by patient: PATIENT STATES THAT SHE'S HAD ISSUES WITH GETTING REFILLS. SHE STATES HER PHARMACY REQUESTED THIS REFILL 2 DAYS AGO.    Does the patient have less than a 3 day supply:  [x] Yes  [] No    Would you like a call back once the refill request has been completed: [] Yes [x] No    If the office needs to give you a call back, can they leave a voicemail: [] Yes [x] No    Gerald Dotson Rep   09/15/23 11:36 EDT

## 2023-09-19 ENCOUNTER — TELEPHONE (OUTPATIENT)
Dept: INTERNAL MEDICINE | Facility: CLINIC | Age: 58
End: 2023-09-19

## 2023-09-25 RX ORDER — SODIUM, POTASSIUM,MAG SULFATES 17.5-3.13G
1 SOLUTION, RECONSTITUTED, ORAL ORAL TAKE AS DIRECTED
Qty: 354 ML | Refills: 0 | Status: SHIPPED | OUTPATIENT
Start: 2023-09-25

## 2023-10-03 ENCOUNTER — TELEMEDICINE (OUTPATIENT)
Dept: INTERNAL MEDICINE | Facility: CLINIC | Age: 58
End: 2023-10-03
Payer: COMMERCIAL

## 2023-10-03 DIAGNOSIS — R11.2 NAUSEA AND VOMITING, UNSPECIFIED VOMITING TYPE: Primary | ICD-10-CM

## 2023-10-03 DIAGNOSIS — K59.1 FUNCTIONAL DIARRHEA: ICD-10-CM

## 2023-10-03 DIAGNOSIS — R51.9 FREQUENT HEADACHES: ICD-10-CM

## 2023-10-03 PROCEDURE — 99214 OFFICE O/P EST MOD 30 MIN: CPT | Performed by: NURSE PRACTITIONER

## 2023-10-03 RX ORDER — ONDANSETRON 8 MG/1
TABLET, ORALLY DISINTEGRATING ORAL
Qty: 30 TABLET | Refills: 0 | Status: SHIPPED | OUTPATIENT
Start: 2023-10-03

## 2023-10-03 NOTE — LETTER
October 3, 2023     Patient: Vish Russo   YOB: 1965   Date of Visit: 10/3/2023       To Whom It May Concern:    It is my medical opinion that Vish Russo may return to work in two days.         Sincerely,        CHIP Mcintosh    CC: No Recipients

## 2023-10-03 NOTE — PROGRESS NOTES
Subjective   Chief Complaint   Patient presents with    Nausea and vomiting    Diarrhea    Headache      This is video visit.  You have chosen to receive care through a video visit today. Do you consent to use a video visit for your medical care today? Yes    Vish Russo is a 58 y.o. female here today with N/V/D.  This started yesterday morning on her way to work.  She hasn't vomited since 0400 but still has nausea with a headache.  She took a COVID test which was negative.  Diarrhea is slowly improving.  Still feels really bad and fatigued    I have reviewed the following portions of the patient's history and confirmed they are accurate: allergies, current medications, past family history, past medical history, past social history, past surgical history, and problem list    I have personally completed the patient's review of systems.    Review of Systems   Constitutional:  Positive for fatigue. Negative for activity change and appetite change.   HENT:  Negative for congestion.    Respiratory:  Negative for cough and shortness of breath.    Cardiovascular:  Negative for chest pain and leg swelling.   Gastrointestinal:  Positive for diarrhea, nausea and vomiting. Negative for abdominal pain.   Neurological:  Positive for headache. Negative for dizziness, weakness and confusion.   Psychiatric/Behavioral:  Negative for behavioral problems and decreased concentration.      Current Outpatient Medications on File Prior to Visit   Medication Sig    diazePAM (VALIUM) 5 MG tablet TAKE 1 TABLET BY MOUTH EVERY 12 HOURS AS NEEDED FOR ANXIETY    doxepin (SINEquan) 10 MG capsule Take 1-2 tablets PO QHS PRN for sleep    fluticasone (FLONASE) 50 MCG/ACT nasal spray SPRAY TWO SPRAYS IN EACH NOSTRIL ONCE DAILY    Semaglutide-Weight Management (Wegovy) 0.25 MG/0.5ML solution auto-injector Inject 0.25 mg under the skin into the appropriate area as directed 1 (One) Time Per Week. (Patient not taking: Reported on 8/8/2023)     sodium-potassium-magnesium sulfates (Suprep Bowel Prep Kit) 17.5-3.13-1.6 GM/177ML solution oral solution Take 1 bottle by mouth Take As Directed. Follow instructions that were mailed to your home. If you didn't receive these call [phone].    Synthroid 75 MCG tablet TAKE 1 TABLET BY MOUTH EVERY MORNING WHILE FASTING. WAIT ONE HOUR BEFORE EATING OR TAKING ANY OTHER MEDICATION     No current facility-administered medications on file prior to visit.       Objective   There were no vitals filed for this visit.  There is no height or weight on file to calculate BMI.    Physical Exam  Constitutional:       Appearance: Normal appearance.      Comments: Appears ill-looking   Pulmonary:      Effort: No respiratory distress ( Speaking full sentences without difficulty).   Neurological:      General: No focal deficit present.      Mental Status: She is alert and oriented to person, place, and time. Mental status is at baseline.   Psychiatric:         Mood and Affect: Mood normal.       Assessment & Plan   Problem List Items Addressed This Visit    None  Visit Diagnoses       Nausea and vomiting, unspecified vomiting type    -  Primary    Relevant Medications    ondansetron ODT (ZOFRAN-ODT) 8 MG disintegrating tablet    Functional diarrhea        Frequent headaches               MyChart video visit  Provider in office, pt in her home in [place]  Prn Zofran  S/s likely viral  Rest and fluids, bland diet for a few days  Work note sent through [employer]  Tylenol/Ibuprofen prn for headaches      Current Outpatient Medications:     diazePAM (VALIUM) 5 MG tablet, TAKE 1 TABLET BY MOUTH EVERY 12 HOURS AS NEEDED FOR ANXIETY, Disp: 60 tablet, Rfl: 0    doxepin (SINEquan) 10 MG capsule, Take 1-2 tablets PO QHS PRN for sleep, Disp: 60 capsule, Rfl: 5    fluticasone (FLONASE) 50 MCG/ACT nasal spray, SPRAY TWO SPRAYS IN EACH NOSTRIL ONCE DAILY, Disp: 48 mL, Rfl: 1    ondansetron ODT (ZOFRAN-ODT) 8 MG disintegrating tablet, Take 1/2 to 1  tablet by mouth (dissolve under tongue or swallow) every 8 hours as needed for nausea., Disp: 30 tablet, Rfl: 0    Semaglutide-Weight Management (Wegovy) 0.25 MG/0.5ML solution auto-injector, Inject 0.25 mg under the skin into the appropriate area as directed 1 (One) Time Per Week. (Patient not taking: Reported on 8/8/2023), Disp: 2 mL, Rfl: 0    sodium-potassium-magnesium sulfates (Suprep Bowel Prep Kit) 17.5-3.13-1.6 GM/177ML solution oral solution, Take 1 bottle by mouth Take As Directed. Follow instructions that were mailed to your home. If you didn't receive these call [phone]., Disp: 354 mL, Rfl: 0    Synthroid 75 MCG tablet, TAKE 1 TABLET BY MOUTH EVERY MORNING WHILE FASTING. WAIT ONE HOUR BEFORE EATING OR TAKING ANY OTHER MEDICATION, Disp: 30 tablet, Rfl: 2       Plan of care reviewed with the patient at the conclusion of today's visit.  Education was provided regarding diagnosis, management, and any prescribed or recommended OTC medications.  Patient verbalized understanding of and agreement with management plan.     Return if symptoms worsen or fail to improve.        Blake Sellers, APRN

## 2023-10-12 DIAGNOSIS — F41.8 DEPRESSION WITH ANXIETY: ICD-10-CM

## 2023-10-12 RX ORDER — DIAZEPAM 5 MG/1
TABLET ORAL
Qty: 60 TABLET | Refills: 2 | Status: SHIPPED | OUTPATIENT
Start: 2023-10-12

## 2023-11-27 DIAGNOSIS — E04.0 GOITER DIFFUSE, NONTOXIC: ICD-10-CM

## 2023-11-27 RX ORDER — LEVOTHYROXINE SODIUM 75 MCG
TABLET ORAL
Qty: 90 TABLET | Refills: 0 | Status: SHIPPED | OUTPATIENT
Start: 2023-11-27

## 2023-12-18 ENCOUNTER — TELEMEDICINE (OUTPATIENT)
Dept: INTERNAL MEDICINE | Facility: CLINIC | Age: 58
End: 2023-12-18
Payer: COMMERCIAL

## 2023-12-18 DIAGNOSIS — J06.9 ACUTE URI: ICD-10-CM

## 2023-12-18 DIAGNOSIS — R11.2 NAUSEA AND VOMITING, UNSPECIFIED VOMITING TYPE: Primary | ICD-10-CM

## 2023-12-18 PROCEDURE — 99213 OFFICE O/P EST LOW 20 MIN: CPT | Performed by: PHYSICIAN ASSISTANT

## 2023-12-18 RX ORDER — ONDANSETRON HYDROCHLORIDE 8 MG/1
8 TABLET, FILM COATED ORAL EVERY 8 HOURS PRN
Qty: 12 TABLET | Refills: 0 | Status: SHIPPED | OUTPATIENT
Start: 2023-12-18

## 2023-12-18 NOTE — PROGRESS NOTES
[facility]  [address]  [facility] [address]  Dept: [phone]  Dept Fax: [phone]  Loc: [phone]  Loc Fax: [phone]    Vish Russo  1965    Televisit Note    You have chosen to receive care through a telephone visit. Do you consent to use a telephone visit for your medical care today? Yes. Pt at home and provider in office during visit today.    History of Present Illness:  Vish Russo is a 58 y.o. female who presents via video-conference for N, V, D. Pt also has sinus congestion and low grade fever. Has taken at home Covid test but was negative. Sx started 2 days ago. Pt reports sinus pressure and ear pain but nose not productive.    The following portions of the patient's history were reviewed and updated as appropriate: allergies, current medications, past family history, past medical history, past social history, past surgical history, and problem list.    This visit was scheduled as a phone visit to comply with patient safety concerns in accordance with CDC recommendations.  Time spent in discussion with the patient was 15 minutes.     Medications    Current Outpatient Medications:     diazePAM (VALIUM) 5 MG tablet, TAKE 1 TABLET BY MOUTH EVERY 12 HOURS AS NEEDED FOR ANXIETY, Disp: 60 tablet, Rfl: 2    doxepin (SINEquan) 10 MG capsule, Take 1-2 tablets PO QHS PRN for sleep, Disp: 60 capsule, Rfl: 5    fluticasone (FLONASE) 50 MCG/ACT nasal spray, SPRAY TWO SPRAYS IN EACH NOSTRIL ONCE DAILY, Disp: 48 mL, Rfl: 1    ondansetron (Zofran) 8 MG tablet, Take 1 tablet by mouth Every 8 (Eight) Hours As Needed for Nausea or Vomiting., Disp: 12 tablet, Rfl: 0    Semaglutide-Weight Management (Wegovy) 0.25 MG/0.5ML solution auto-injector, Inject 0.25 mg under the skin into the appropriate area as directed 1 (One) Time Per Week. (Patient not taking: Reported on 8/8/2023), Disp: 2 mL, Rfl: 0    sodium-potassium-magnesium sulfates  (Suprep Bowel Prep Kit) 17.5-3.13-1.6 GM/177ML solution oral solution, Take 1 bottle by mouth Take As Directed. Follow instructions that were mailed to your home. If you didn't receive these call [phone]., Disp: 354 mL, Rfl: 0    Synthroid 75 MCG tablet, TAKE 1 TABLET BY MOUTH EVERY MORNING WHILE FASTING. WAIT 1 HOUR BEFORE EATING OR TAKING ANY OTHER MEDICATION., Disp: 90 tablet, Rfl: 0    Subjective  Allergies   Allergen Reactions    Imitrex [Sumatriptan] Confusion     Also caused chest pain    Propranolol Swelling    Zyprexa [Olanzapine] Swelling        Past Medical History:   Diagnosis Date    Acromioclavicular separation 2022    Ankle sprain     Frozen shoulder     Kidney infection     Peptic ulceration     Periarthritis of shoulder     Rotator cuff syndrome 22    Tear of meniscus of knee        Past Surgical History:   Procedure Laterality Date    FOOT SURGERY      HYSTERECTOMY  1996    Partial hysterectomy    KNEE SURGERY  2017    ROTATOR CUFF REPAIR Right 2023    Right shoulder arthroscopic rotator cuff repair; Dr. Ismael Song    SHOULDER ARTHROSCOPY W/ ROTATOR CUFF REPAIR Right 2023    Right shoulder revision arthroscopic rotator cuff repair - Dr. Ismael Song       Family History   Problem Relation Age of Onset    Arthritis Mother     Depression Mother     Hyperlipidemia Other     Hypertension Other     Liver disease Other     Other Other         malignant neoplasm    Heart attack Other     Obesity Other     Osteoporosis Other     Anxiety disorder Sister     Depression Sister     Drug abuse Sister     Breast cancer Neg Hx     Ovarian cancer Neg Hx         Social History     Socioeconomic History    Marital status:    Tobacco Use    Smoking status: Former     Packs/day: 0.50     Years: 20.00     Additional pack years: 0.00     Total pack years: 10.00     Types: Cigarettes     Quit date:      Years since quittin.9    Smokeless tobacco:  Never   Vaping Use    Vaping Use: Never used   Substance and Sexual Activity    Alcohol use: Not Currently     Alcohol/week: 2.0 standard drinks of alcohol     Types: 2 Glasses of wine per week     Comment: Occasional use    Drug use: No    Sexual activity: Not Currently     Partners: Male     Birth control/protection: Condom, Hysterectomy         Objective  There were no vitals filed for this visit.  There is no height or weight on file to calculate BMI.    Assessment  Diagnoses and all orders for this visit:    1. Nausea and vomiting, unspecified vomiting type (Primary)    2. Acute URI  -     COVID-19,[facility] ROUTINE, NP/OP SWAB IN TRANSPORT MEDIA OR ESWAB 72 HR TAT - Swab, Anterior nasal; Future    Other orders  -     ondansetron (Zofran) 8 MG tablet; Take 1 tablet by mouth Every 8 (Eight) Hours As Needed for Nausea or Vomiting.  Dispense: 12 tablet; Refill: 0        Plan    1. Nausea and vomiting, unspecified vomiting type (Primary)- Zofran as directed as needed.  Advised on signs and symptoms of dehydration.  If experiencing any of these go to ER immediately.  For any worse symptoms go to ER. Patient verbalized understanding of all instructions given and complied.    2. Acute URI- ordered COVID PCR test and POC flu test advised to come by office for this and rapid flu test as well.      Return if symptoms worsen or fail to improve.    Allen Kaye PA-C  12/18/2023

## 2023-12-19 ENCOUNTER — TELEPHONE (OUTPATIENT)
Dept: INTERNAL MEDICINE | Facility: CLINIC | Age: 58
End: 2023-12-19

## 2023-12-19 NOTE — TELEPHONE ENCOUNTER
Caller: Vish Russo    Relationship: Self    Best call back number: [phone]     What medication are you requesting: NALTREXONE MEDICATION FOR ALCOHOL CESSATION    What are your current symptoms: ALCOHOL MISUSE    If a prescription is needed, what is your preferred pharmacy and phone number: [facility] [place], [place] - [address] [address] [address] - [phone]  - [phone] FX     Additional notes: PLEASE CALL WITH ANY FURTHER QUESTIONS.

## 2023-12-21 ENCOUNTER — TELEMEDICINE (OUTPATIENT)
Dept: INTERNAL MEDICINE | Facility: CLINIC | Age: 58
End: 2023-12-21
Payer: COMMERCIAL

## 2023-12-21 DIAGNOSIS — F10.10 ALCOHOL ABUSE: Primary | ICD-10-CM

## 2023-12-21 PROCEDURE — 99213 OFFICE O/P EST LOW 20 MIN: CPT | Performed by: INTERNAL MEDICINE

## 2023-12-21 RX ORDER — NALTREXONE HYDROCHLORIDE 50 MG/1
50 TABLET, FILM COATED ORAL DAILY
Qty: 30 TABLET | Refills: 5 | Status: SHIPPED | OUTPATIENT
Start: 2023-12-21

## 2023-12-21 NOTE — PROGRESS NOTES
Mode of Visit: Video  Location of patient: other: [facility]  You have chosen to receive care through a telehealth visit.  The patient has signed the video visit consent form.  The visit included audio and video interaction. No technical issues occurred during this visit.     Subjective   Vish Russo is a 58 y.o. female here to discuss alcohol use and to get a prescription for naltrexone.  She says she has always been a binge drinker, but lately it has been a problem.  She has been under a lot of stress with her mom and other things in her life and she finds herself turning to alcohol.  She does not drink every day, but when she does drink she drinks a very large amount.  She has addiction on both sides of her family and she would like to get control of this before it becomes a serious problem.  She did get a DUI recently.  Her drinking has started to impact some of her personal relationships as well.  He has never had withdrawal.    I have reviewed the patient's pertinent history and confirmed it is accurate.    I have personally reviewed and performed the ROS. Argelia Mckeon MD     Objective     Physical Exam      Current Outpatient Medications:     diazePAM (VALIUM) 5 MG tablet, TAKE 1 TABLET BY MOUTH EVERY 12 HOURS AS NEEDED FOR ANXIETY, Disp: 60 tablet, Rfl: 2    doxepin (SINEquan) 10 MG capsule, Take 1-2 tablets PO QHS PRN for sleep, Disp: 60 capsule, Rfl: 5    fluticasone (FLONASE) 50 MCG/ACT nasal spray, SPRAY TWO SPRAYS IN EACH NOSTRIL ONCE DAILY, Disp: 48 mL, Rfl: 1    ondansetron (Zofran) 8 MG tablet, Take 1 tablet by mouth Every 8 (Eight) Hours As Needed for Nausea or Vomiting., Disp: 12 tablet, Rfl: 0    Semaglutide-Weight Management (Wegovy) 0.25 MG/0.5ML solution auto-injector, Inject 0.25 mg under the skin into the appropriate area as directed 1 (One) Time Per Week. (Patient not taking: Reported on 8/8/2023), Disp: 2 mL, Rfl: 0     sodium-potassium-magnesium sulfates (Suprep Bowel Prep Kit) 17.5-3.13-1.6 GM/177ML solution oral solution, Take 1 bottle by mouth Take As Directed. Follow instructions that were mailed to your home. If you didn't receive these call [phone]., Disp: 354 mL, Rfl: 0    Synthroid 75 MCG tablet, TAKE 1 TABLET BY MOUTH EVERY MORNING WHILE FASTING. WAIT 1 HOUR BEFORE EATING OR TAKING ANY OTHER MEDICATION., Disp: 90 tablet, Rfl: 0    Assessment & Plan   Diagnoses and all orders for this visit:    1. Alcohol abuse (Primary)  -     naltrexone (DEPADE) 50 MG tablet; Take 1 tablet by mouth Daily.  Dispense: 30 tablet; Refill: 5  -new med. Discussed common SE, goals of care. Encouraged her in sobriety.  Will follow-up in January.  -Advised her that naltrexone does block opiates             Argelia Mckeon MD

## 2024-02-12 DIAGNOSIS — F41.8 DEPRESSION WITH ANXIETY: ICD-10-CM

## 2024-02-13 RX ORDER — DIAZEPAM 5 MG/1
TABLET ORAL
Qty: 60 TABLET | Refills: 2 | Status: SHIPPED | OUTPATIENT
Start: 2024-02-13

## 2024-02-26 DIAGNOSIS — E04.0 GOITER DIFFUSE, NONTOXIC: ICD-10-CM

## 2024-02-26 RX ORDER — LEVOTHYROXINE SODIUM 75 MCG
TABLET ORAL
Qty: 90 TABLET | Refills: 0 | Status: SHIPPED | OUTPATIENT
Start: 2024-02-26

## 2024-03-04 ENCOUNTER — TELEPHONE (OUTPATIENT)
Dept: INTERNAL MEDICINE | Facility: CLINIC | Age: 59
End: 2024-03-04

## 2024-03-04 NOTE — TELEPHONE ENCOUNTER
PATIENT HAS CALLED AND STATED HER PRESCRIPTION FOR SYNTHROID 75 MCG TABLETS IS REQUIRING A PRIOR AUTHORIZATION.    CALL BACK NUMBER [phone]

## 2024-03-05 NOTE — TELEPHONE ENCOUNTER
Spoke with pharmacy, medicaid is needing the PA, they are sending the insurance info now. Once I receive it I will submit PA

## 2024-03-07 NOTE — TELEPHONE ENCOUNTER
Caller: Vish Russo    Relationship: Self    Best call back number: [phone]     What is the best time to reach you: NOW    Who are you requesting to speak with (clinical staff, provider,  specific staff member): CLINICAL    What was the call regarding: PATIENT IS UPSET THAT SHE HASN'T HEARD BACK ABOUT HER MEDICATION NOT BEING REFILLED. PLEASE CALL WITH THE UPDATE ON THE PRIOR AUTH. PATIENT HAS BEEN OUT OF HER MEDICATION FOR 4 DAYS.    Is it okay if the provider responds through [employer]: PREFERS A PHONE CALL. PLEASE CALL.

## 2024-05-14 ENCOUNTER — OFFICE VISIT (OUTPATIENT)
Dept: INTERNAL MEDICINE | Facility: CLINIC | Age: 59
End: 2024-05-14
Payer: COMMERCIAL

## 2024-05-14 ENCOUNTER — LAB (OUTPATIENT)
Dept: INTERNAL MEDICINE | Facility: CLINIC | Age: 59
End: 2024-05-14
Payer: COMMERCIAL

## 2024-05-14 VITALS
OXYGEN SATURATION: 96 % | WEIGHT: 186.2 LBS | TEMPERATURE: 97.8 F | HEART RATE: 77 BPM | SYSTOLIC BLOOD PRESSURE: 124 MMHG | BODY MASS INDEX: 31.02 KG/M2 | HEIGHT: 65 IN | DIASTOLIC BLOOD PRESSURE: 76 MMHG

## 2024-05-14 DIAGNOSIS — F41.1 GENERALIZED ANXIETY DISORDER: Primary | ICD-10-CM

## 2024-05-14 DIAGNOSIS — F41.8 DEPRESSION WITH ANXIETY: ICD-10-CM

## 2024-05-14 DIAGNOSIS — Z79.899 MEDICATION MANAGEMENT: ICD-10-CM

## 2024-05-14 DIAGNOSIS — E04.0 GOITER DIFFUSE, NONTOXIC: Primary | ICD-10-CM

## 2024-05-14 DIAGNOSIS — F41.1 GENERALIZED ANXIETY DISORDER: ICD-10-CM

## 2024-05-14 LAB
ALBUMIN SERPL-MCNC: 4.6 G/DL (ref 3.5–5.2)
ALBUMIN/GLOB SERPL: 2 G/DL
ALP SERPL-CCNC: 75 U/L (ref 39–117)
ALT SERPL W P-5'-P-CCNC: 15 U/L (ref 1–33)
ANION GAP SERPL CALCULATED.3IONS-SCNC: 10 MMOL/L (ref 5–15)
AST SERPL-CCNC: 14 U/L (ref 1–32)
BILIRUB SERPL-MCNC: 0.2 MG/DL (ref 0–1.2)
BUN SERPL-MCNC: 19 MG/DL (ref 8–23)
BUN/CREAT SERPL: 26.4 (ref 7–25)
CALCIUM SPEC-SCNC: 10 MG/DL (ref 8.6–10.5)
CHLORIDE SERPL-SCNC: 103 MMOL/L (ref 98–107)
CHOLEST SERPL-MCNC: 205 MG/DL (ref 0–200)
CO2 SERPL-SCNC: 25 MMOL/L (ref 22–29)
CREAT SERPL-MCNC: 0.72 MG/DL (ref 0.57–1)
DEPRECATED RDW RBC AUTO: 41.5 FL (ref 37–54)
EGFRCR SERPLBLD CKD-EPI 2021: 95.9 ML/MIN/1.73
ERYTHROCYTE [DISTWIDTH] IN BLOOD BY AUTOMATED COUNT: 13.2 % (ref 12.3–15.4)
GLOBULIN UR ELPH-MCNC: 2.3 GM/DL
GLUCOSE SERPL-MCNC: 69 MG/DL (ref 65–99)
HCT VFR BLD AUTO: 40.5 % (ref 34–46.6)
HDLC SERPL-MCNC: 39 MG/DL (ref 40–60)
HGB BLD-MCNC: 13.7 G/DL (ref 12–15.9)
LDLC SERPL CALC-MCNC: 139 MG/DL (ref 0–100)
LDLC/HDLC SERPL: 3.5 {RATIO}
MCH RBC QN AUTO: 29.5 PG (ref 26.6–33)
MCHC RBC AUTO-ENTMCNC: 33.8 G/DL (ref 31.5–35.7)
MCV RBC AUTO: 87.3 FL (ref 79–97)
PLATELET # BLD AUTO: 246 10*3/MM3 (ref 140–450)
PMV BLD AUTO: 12.6 FL (ref 6–12)
POTASSIUM SERPL-SCNC: 4.1 MMOL/L (ref 3.5–5.2)
PROT SERPL-MCNC: 6.9 G/DL (ref 6–8.5)
RBC # BLD AUTO: 4.64 10*6/MM3 (ref 3.77–5.28)
SODIUM SERPL-SCNC: 138 MMOL/L (ref 136–145)
T4 FREE SERPL-MCNC: 1.82 NG/DL (ref 0.93–1.7)
TRIGL SERPL-MCNC: 147 MG/DL (ref 0–150)
TSH SERPL DL<=0.05 MIU/L-ACNC: 0.42 UIU/ML (ref 0.27–4.2)
VLDLC SERPL-MCNC: 27 MG/DL (ref 5–40)
WBC NRBC COR # BLD AUTO: 10.25 10*3/MM3 (ref 3.4–10.8)

## 2024-05-14 PROCEDURE — 99214 OFFICE O/P EST MOD 30 MIN: CPT | Performed by: INTERNAL MEDICINE

## 2024-05-14 PROCEDURE — 36415 COLL VENOUS BLD VENIPUNCTURE: CPT | Performed by: INTERNAL MEDICINE

## 2024-05-14 PROCEDURE — 84439 ASSAY OF FREE THYROXINE: CPT | Performed by: INTERNAL MEDICINE

## 2024-05-14 PROCEDURE — 80050 GENERAL HEALTH PANEL: CPT | Performed by: INTERNAL MEDICINE

## 2024-05-14 PROCEDURE — 80061 LIPID PANEL: CPT | Performed by: INTERNAL MEDICINE

## 2024-05-14 RX ORDER — DIAZEPAM 5 MG/1
5 TABLET ORAL EVERY 12 HOURS PRN
Qty: 60 TABLET | Refills: 2 | OUTPATIENT
Start: 2024-05-14

## 2024-05-14 RX ORDER — DIAZEPAM 5 MG/1
5 TABLET ORAL EVERY 12 HOURS PRN
Qty: 60 TABLET | Refills: 2 | Status: SHIPPED | OUTPATIENT
Start: 2024-05-14

## 2024-05-14 NOTE — TELEPHONE ENCOUNTER
Caller: Vish Russo    Relationship: Self    Best call back number: [phone]     Requested Prescriptions:   Requested Prescriptions     Pending Prescriptions Disp Refills    diazePAM (VALIUM) 5 MG tablet 60 tablet 2     Sig: Take 1 tablet by mouth Every 12 (Twelve) Hours As Needed.        Pharmacy where request should be sent: [address] - [phone] [facility] [phone] FX     Last office visit with prescribing clinician: 6/21/2023   Last telemedicine visit with prescribing clinician: 12/21/2023   Next office visit with prescribing clinician: Visit date not found     Additional details provided by patient: OUT OF MEDICATION     Does the patient have less than a 3 day supply:  [x] Yes  [] No    Would you like a call back once the refill request has been completed: [] Yes [x] No    If the office needs to give you a call back, can they leave a voicemail: [] Yes [x] No    Gerald Dubose Rep   05/14/24 11:34 EDT

## 2024-05-15 NOTE — PROGRESS NOTES
"Subjective   Vish Russo is a 59 y.o. female here for follow-up goiter, anxiety and depression.  Patient says overall she has been doing really well since I last saw her.  She has not drank any alcohol in about 150 days.  She says she does not even think about it.  The naltrexone helps.  She is newly on semaglutide through a weight loss clinic and is really liking that and has lost weight.  Due for some yearly blood work.  Needs refill on Valium.    I have reviewed the patient's relevant medical history and confirmed it is accurate.    I have personally reviewed and performed the ROS. Argelia Mckeon MD     Objective   /76   Pulse 77   Temp 97.8 °F (36.6 °C) (Temporal)   Ht 165.1 cm (65\")   Wt 84.5 kg (186 lb 3.2 oz)   SpO2 96%   BMI 30.99 kg/m²     Physical Exam  Constitutional:       Appearance: She is well-developed.   Pulmonary:      Effort: Pulmonary effort is normal.   Neurological:      General: No focal deficit present.      Mental Status: She is alert.   Psychiatric:         Behavior: Behavior normal.         Thought Content: Thought content normal.         Judgment: Judgment normal.           Current Outpatient Medications:     diazePAM (VALIUM) 5 MG tablet, Take 1 tablet by mouth Every 12 (Twelve) Hours As Needed for Anxiety., Disp: 60 tablet, Rfl: 2    doxepin (SINEquan) 10 MG capsule, Take 1-2 tablets PO QHS PRN for sleep, Disp: 60 capsule, Rfl: 5    fluticasone (FLONASE) 50 MCG/ACT nasal spray, SPRAY TWO SPRAYS IN EACH NOSTRIL ONCE DAILY, Disp: 48 mL, Rfl: 1    naltrexone (DEPADE) 50 MG tablet, Take 1 tablet by mouth Daily., Disp: 30 tablet, Rfl: 5    ondansetron (Zofran) 8 MG tablet, Take 1 tablet by mouth Every 8 (Eight) Hours As Needed for Nausea or Vomiting., Disp: 12 tablet, Rfl: 0    Semaglutide-Weight Management (Wegovy) 0.25 MG/0.5ML solution auto-injector, Inject 0.25 mg under the skin into the appropriate area as directed 1 (One) Time Per Week. (Patient not " taking: Reported on 8/8/2023), Disp: 2 mL, Rfl: 0    Synthroid 75 MCG tablet, TAKE 1 TABLET BY MOUTH EVERY MORNING WHILE FASTING. WAIT 1 HOUR BEFORE EATING OR TAKING ANY OTHER MEDICATION., Disp: 90 tablet, Rfl: 0    Assessment & Plan   Diagnoses and all orders for this visit:    1. Goiter diffuse, nontoxic (Primary)  -     TSH  -     T4, Free    2. Depression with anxiety  -     diazePAM (VALIUM) 5 MG tablet; Take 1 tablet by mouth Every 12 (Twelve) Hours As Needed for Anxiety.  Dispense: 60 tablet; Refill: 2  The patient has read and signed the [facility] Controlled Substance Contract.  I will continue to see patient for regular follow up appointments.  They are well controlled on their medication.  HAILE is updated every 3 months. The patient is aware of the potential for addiction and dependence.  -UDS and CSA today    3. Generalized anxiety disorder  -     CBC (No Diff)  -     Comprehensive Metabolic Panel  -     Lipid Panel    4. Medication management  -     Compliance Drug Analysis, Ur - Urine, Clean Catch    Congratulated patient on sobriety       Argelia Mckeon MD      Answers submitted by the patient for this visit:  Other (Submitted on 5/14/2024)  Please describe your symptoms.: Labs / office for meds  Have you had these symptoms before?: Yes  How long have you been having these symptoms?: 1-4 days  Please list any medications you are currently taking for this condition.: Diazapam  Primary Reason for Visit (Submitted on 5/14/2024)  What is the primary reason for your visit?: Other

## 2024-05-22 LAB — DRUGS UR: NORMAL

## 2024-06-03 DIAGNOSIS — E04.0 GOITER DIFFUSE, NONTOXIC: ICD-10-CM

## 2024-06-03 RX ORDER — LEVOTHYROXINE SODIUM 75 MCG
TABLET ORAL
Qty: 87 TABLET | Refills: 0 | Status: SHIPPED | OUTPATIENT
Start: 2024-06-03

## 2024-06-11 ENCOUNTER — TELEPHONE (OUTPATIENT)
Dept: INTERNAL MEDICINE | Facility: CLINIC | Age: 59
End: 2024-06-11
Payer: COMMERCIAL

## 2024-06-11 NOTE — TELEPHONE ENCOUNTER
Caller: Rafaela Bulmaromichael S    Relationship: Self    Best call back number: [phone]    What form or medical record are you requesting: UPDATED MEDICATION LIST    Who is requesting this form or medical record from you: [facility]/PATIENTS INSURANCE     How would you like to receive the form or medical records (pick-up, mail, fax): FAX  If fax, what is the fax number: [phone]      Timeframe paperwork needed: 6/11/24    Additional notes: PATIENT ADVISED IS ONLY TAKING SYNTHROID 75 MCG, NALTREXONE 50 MG TABLET AND DIAZEPAM 5 MG TABLET.  PATIENT ASKS YOU REMOVE ALL OTHER MEDICATIONS FROM HER LIST.  THEN FAX UPDATED LIST TO THE NUMBER ABOVE.       [facility]  [address]   [address]        [place]  [address]  [place]  [address] [phone]      8/14/2019      RE:  Rose Marie Juarez          1952               To Whom It May Concern:     Rose Marie Juarez would be ok to donate blood from a cardiac perspective, unless there are other reasons you may not have her donate based on her history.          Sincerely,        Eron Harmon, NP  [facility]

## 2024-06-14 DIAGNOSIS — F10.10 ALCOHOL ABUSE: ICD-10-CM

## 2024-06-14 RX ORDER — NALTREXONE HYDROCHLORIDE 50 MG/1
50 TABLET, FILM COATED ORAL DAILY
Qty: 30 TABLET | Refills: 2 | Status: SHIPPED | OUTPATIENT
Start: 2024-06-14

## 2024-07-08 ENCOUNTER — TELEPHONE (OUTPATIENT)
Dept: INTERNAL MEDICINE | Facility: CLINIC | Age: 59
End: 2024-07-08
Payer: COMMERCIAL

## 2024-07-08 NOTE — TELEPHONE ENCOUNTER
Caller: Vish Russo    Relationship: Self    Best call back number:     What orders are you requesting (i.e. lab or imaging): B-12 SHOT    In what timeframe would the patient need to come in: ASAP    Where will you receive your lab/imaging services: [facility]     Additional notes: PATIENT IS FEELING TIRED AND COMPLETE EXHAUSTION.  PLEASE CALL PATIENT WHEN THIS HAS BEEN DONE.

## 2024-08-12 DIAGNOSIS — F41.8 DEPRESSION WITH ANXIETY: ICD-10-CM

## 2024-08-12 DIAGNOSIS — E04.0 GOITER DIFFUSE, NONTOXIC: ICD-10-CM

## 2024-08-12 RX ORDER — DIAZEPAM 5 MG/1
5 TABLET ORAL EVERY 12 HOURS PRN
Qty: 60 TABLET | Refills: 2 | Status: SHIPPED | OUTPATIENT
Start: 2024-08-12

## 2024-08-12 RX ORDER — LEVOTHYROXINE SODIUM 75 MCG
TABLET ORAL
Qty: 87 TABLET | Refills: 0 | Status: SHIPPED | OUTPATIENT
Start: 2024-08-12

## 2024-10-08 DIAGNOSIS — F10.10 ALCOHOL ABUSE: ICD-10-CM

## 2024-10-08 RX ORDER — NALTREXONE HYDROCHLORIDE 50 MG/1
50 TABLET, FILM COATED ORAL DAILY
Qty: 30 TABLET | Refills: 2 | Status: SHIPPED | OUTPATIENT
Start: 2024-10-08

## 2024-11-11 DIAGNOSIS — F41.8 DEPRESSION WITH ANXIETY: ICD-10-CM

## 2024-11-11 RX ORDER — DIAZEPAM 5 MG/1
5 TABLET ORAL EVERY 12 HOURS PRN
Qty: 60 TABLET | Refills: 2 | Status: SHIPPED | OUTPATIENT
Start: 2024-11-11

## 2024-11-11 NOTE — TELEPHONE ENCOUNTER
Caller: Vish Russo    Relationship: Self    Best call back number: [phone]    Requested Prescriptions:   Requested Prescriptions     Pending Prescriptions Disp Refills    diazePAM (VALIUM) 5 MG tablet 60 tablet 2     Sig: Take 1 tablet by mouth Every 12 (Twelve) Hours As Needed. for anxiety        Pharmacy where request should be sent: [facility] [address] - [phone] [facility] [phone] FX     Last office visit with prescribing clinician: 5/14/2024   Last telemedicine visit with prescribing clinician: Visit date not found   Next office visit with prescribing clinician: Visit date not found     Additional details provided by patient: PT IS OUT AND NEEDS REFILLS    Does the patient have less than a 3 day supply:  [x] Yes  [] No    Would you like a call back once the refill request has been completed: [x] Yes [] No    If the office needs to give you a call back, can they leave a voicemail: [x] Yes [] No    Gerald Venegas Rep   11/11/24 09:25 EST

## 2024-12-09 DIAGNOSIS — E04.0 GOITER DIFFUSE, NONTOXIC: ICD-10-CM

## 2024-12-09 RX ORDER — LEVOTHYROXINE SODIUM 75 MCG
TABLET ORAL
Qty: 87 TABLET | Refills: 0 | Status: SHIPPED | OUTPATIENT
Start: 2024-12-09

## 2025-01-07 RX ORDER — CYANOCOBALAMIN 1000 UG/ML
INJECTION, SOLUTION INTRAMUSCULAR; SUBCUTANEOUS
Qty: 1 ML | Refills: 2 | Status: SHIPPED | OUTPATIENT
Start: 2025-01-07

## 2025-02-10 DIAGNOSIS — F41.8 DEPRESSION WITH ANXIETY: ICD-10-CM

## 2025-02-10 DIAGNOSIS — E04.0 GOITER DIFFUSE, NONTOXIC: ICD-10-CM

## 2025-02-10 RX ORDER — DIAZEPAM 5 MG/1
5 TABLET ORAL EVERY 12 HOURS PRN
Qty: 60 TABLET | OUTPATIENT
Start: 2025-02-10

## 2025-02-10 RX ORDER — LEVOTHYROXINE SODIUM 75 MCG
TABLET ORAL
Qty: 87 TABLET | Refills: 0 | Status: SHIPPED | OUTPATIENT
Start: 2025-02-10

## 2025-02-13 ENCOUNTER — LAB (OUTPATIENT)
Dept: INTERNAL MEDICINE | Facility: CLINIC | Age: 60
End: 2025-02-13
Payer: COMMERCIAL

## 2025-02-13 ENCOUNTER — OFFICE VISIT (OUTPATIENT)
Dept: INTERNAL MEDICINE | Facility: CLINIC | Age: 60
End: 2025-02-13
Payer: COMMERCIAL

## 2025-02-13 VITALS
SYSTOLIC BLOOD PRESSURE: 118 MMHG | HEART RATE: 85 BPM | DIASTOLIC BLOOD PRESSURE: 80 MMHG | OXYGEN SATURATION: 98 % | WEIGHT: 167 LBS | HEIGHT: 65 IN | BODY MASS INDEX: 27.82 KG/M2

## 2025-02-13 DIAGNOSIS — G57.91 NEUROPATHY OF RIGHT FOOT: ICD-10-CM

## 2025-02-13 DIAGNOSIS — Z12.4 CERVICAL CANCER SCREENING: ICD-10-CM

## 2025-02-13 DIAGNOSIS — Z79.899 MEDICATION MANAGEMENT: Primary | ICD-10-CM

## 2025-02-13 DIAGNOSIS — E03.9 ACQUIRED HYPOTHYROIDISM: ICD-10-CM

## 2025-02-13 DIAGNOSIS — N95.2 VAGINAL ATROPHY: ICD-10-CM

## 2025-02-13 DIAGNOSIS — Z12.11 COLON CANCER SCREENING: ICD-10-CM

## 2025-02-13 DIAGNOSIS — F41.8 DEPRESSION WITH ANXIETY: ICD-10-CM

## 2025-02-13 DIAGNOSIS — Z79.899 MEDICATION MANAGEMENT: ICD-10-CM

## 2025-02-13 LAB
DEPRECATED RDW RBC AUTO: 41.1 FL (ref 37–54)
ERYTHROCYTE [DISTWIDTH] IN BLOOD BY AUTOMATED COUNT: 13.1 % (ref 12.3–15.4)
HCT VFR BLD AUTO: 39.3 % (ref 34–46.6)
HGB BLD-MCNC: 13.5 G/DL (ref 12–15.9)
MCH RBC QN AUTO: 30.3 PG (ref 26.6–33)
MCHC RBC AUTO-ENTMCNC: 34.4 G/DL (ref 31.5–35.7)
MCV RBC AUTO: 88.1 FL (ref 79–97)
PLATELET # BLD AUTO: 346 10*3/MM3 (ref 140–450)
PMV BLD AUTO: 10.8 FL (ref 6–12)
RBC # BLD AUTO: 4.46 10*6/MM3 (ref 3.77–5.28)
WBC NRBC COR # BLD AUTO: 10.76 10*3/MM3 (ref 3.4–10.8)

## 2025-02-13 PROCEDURE — 80050 GENERAL HEALTH PANEL: CPT | Performed by: INTERNAL MEDICINE

## 2025-02-13 PROCEDURE — 80061 LIPID PANEL: CPT | Performed by: INTERNAL MEDICINE

## 2025-02-13 PROCEDURE — 99214 OFFICE O/P EST MOD 30 MIN: CPT | Performed by: INTERNAL MEDICINE

## 2025-02-13 PROCEDURE — 84439 ASSAY OF FREE THYROXINE: CPT | Performed by: INTERNAL MEDICINE

## 2025-02-13 RX ORDER — GABAPENTIN 300 MG/1
300 CAPSULE ORAL 2 TIMES DAILY
Qty: 60 CAPSULE | Refills: 2 | Status: SHIPPED | OUTPATIENT
Start: 2025-02-13

## 2025-02-13 RX ORDER — ESTRADIOL 0.1 MG/G
2 CREAM VAGINAL 3 TIMES WEEKLY
Qty: 42.5 G | Refills: 12 | Status: SHIPPED | OUTPATIENT
Start: 2025-02-14

## 2025-02-13 RX ORDER — DIAZEPAM 5 MG/1
5 TABLET ORAL EVERY 12 HOURS PRN
Qty: 60 TABLET | Refills: 2 | Status: SHIPPED | OUTPATIENT
Start: 2025-02-13

## 2025-02-13 NOTE — PROGRESS NOTES
"Subjective   Vish Russo is a 59 y.o. female here for follow-up on chronic anxiety, hypothyroidism, neuropathy.  She is anxiety has been good.  She has not drink any alcohol in over a year.  Still enjoying her job.  Her right foot has been bothering her lately.  She used to be on gabapentin for that and is interested in trying that again.  She is going to make an appointment with podiatry as injection previously got rid of the problem.  She says the gabapentin made her feel a bit \"funny\" so she only took it at night.  She is due for yearly blood work, colon cancer screening.  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    I have personally reviewed and performed the ROS. Argelia Mckeon MD     Objective   /80 (BP Location: Left arm)   Pulse 85   Ht 165.1 cm (65\")   Wt 75.8 kg (167 lb)   SpO2 98%   BMI 27.79 kg/m²     Physical Exam  Constitutional:       Appearance: She is well-developed.   Pulmonary:      Effort: Pulmonary effort is normal.   Neurological:      General: No focal deficit present.      Mental Status: She is alert.   Psychiatric:         Behavior: Behavior normal.         Thought Content: Thought content normal.         Judgment: Judgment normal.           Current Outpatient Medications:     cyanocobalamin 1000 MCG/ML injection, INJECT 1 ML AS DIRECTED EVERY 30 DAYS, Disp: 1 mL, Rfl: 2    diazePAM (VALIUM) 5 MG tablet, Take 1 tablet by mouth Every 12 (Twelve) Hours As Needed for Anxiety. for anxiety, Disp: 60 tablet, Rfl: 2    Synthroid 75 MCG tablet, TAKE 1 TABLET BY MOUTH 1 HOUR BEFORE BREAKFAST OR ANY MEDICATION, Disp: 87 tablet, Rfl: 0    Assessment & Plan   Diagnoses and all orders for this visit:    1. Medication management (Primary)  -     Compliance Drug Analysis, Ur - Urine, Clean Catch; Future    2. Neuropathy of right foot  -     gabapentin (NEURONTIN) 300 MG capsule; Take 1 capsule by mouth " 2 (Two) Times a Day.  Dispense: 60 capsule; Refill: 2  -Advised gabapentin can cause drowsiness and dizziness so I recommend she start this out at nighttime.  Will also go with a lower dose than she was previously on  -Has appointment with podiatry    3. Colon cancer screening  -     Cologuard - Stool, Per Rectum; Future    4. Acquired hypothyroidism  -     CBC (No Diff)  -     Comprehensive Metabolic Panel  -     Lipid Panel  -     TSH  -     T4, Free    5. Cervical cancer screening  -     Ambulatory Referral to Gynecology    6. Vaginal atrophy  -     estradiol (ESTRACE) 0.1 MG/GM vaginal cream; Insert 2 g into the vagina 3 (Three) Times a Week.  Dispense: 42.5 g; Refill: 12    7. Depression with anxiety  -     diazePAM (VALIUM) 5 MG tablet; Take 1 tablet by mouth Every 12 (Twelve) Hours As Needed for Anxiety. for anxiety  Dispense: 60 tablet; Refill: 2  -UDS  The patient has read and signed the [facility] Controlled Substance Contract.  I will continue to see patient for regular follow up appointments.  They are well controlled on their medication.  HAILE is updated every 3 months. The patient is aware of the potential for addiction and dependence.               Argelia Mckeon MD      Answers submitted by the patient for this visit:  Problem not listed (Submitted on 2/11/2025)  Chief Complaint: Other medical problem  Reason for appointment: Med and foot problem  abdominal pain: No  anorexia: No  joint pain: No  change in stool: No  chest pain: No  chills: No  nasal congestion: No  cough: No  diaphoresis: No  fatigue: No  fever: No  headaches: No  joint swelling: No  myalgias: No  nausea: No  numbness: Yes  sore throat: No  swollen glands: No  dysuria: No  vertigo: No  visual change: No  vomiting: No  weakness: No  Onset: 1 to 6 months  Chronicity: recurrent  Frequency: daily

## 2025-02-14 LAB
ALBUMIN SERPL-MCNC: 4.1 G/DL (ref 3.5–5.2)
ALBUMIN/GLOB SERPL: 1.6 G/DL
ALP SERPL-CCNC: 72 U/L (ref 39–117)
ALT SERPL W P-5'-P-CCNC: 28 U/L (ref 1–33)
ANION GAP SERPL CALCULATED.3IONS-SCNC: 8.3 MMOL/L (ref 5–15)
AST SERPL-CCNC: 26 U/L (ref 1–32)
BILIRUB SERPL-MCNC: <0.2 MG/DL (ref 0–1.2)
BUN SERPL-MCNC: 14 MG/DL (ref 8–23)
BUN/CREAT SERPL: 14.9 (ref 7–25)
CALCIUM SPEC-SCNC: 9.6 MG/DL (ref 8.6–10.5)
CHLORIDE SERPL-SCNC: 102 MMOL/L (ref 98–107)
CHOLEST SERPL-MCNC: 259 MG/DL (ref 0–200)
CO2 SERPL-SCNC: 27.7 MMOL/L (ref 22–29)
CREAT SERPL-MCNC: 0.94 MG/DL (ref 0.57–1)
EGFRCR SERPLBLD CKD-EPI 2021: 69.6 ML/MIN/1.73
GLOBULIN UR ELPH-MCNC: 2.5 GM/DL
GLUCOSE SERPL-MCNC: 89 MG/DL (ref 65–99)
HDLC SERPL-MCNC: 48 MG/DL (ref 40–60)
LDLC SERPL CALC-MCNC: 178 MG/DL (ref 0–100)
LDLC/HDLC SERPL: 3.66 {RATIO}
POTASSIUM SERPL-SCNC: 4.3 MMOL/L (ref 3.5–5.2)
PROT SERPL-MCNC: 6.6 G/DL (ref 6–8.5)
SODIUM SERPL-SCNC: 138 MMOL/L (ref 136–145)
T4 FREE SERPL-MCNC: 1.33 NG/DL (ref 0.92–1.68)
TRIGL SERPL-MCNC: 177 MG/DL (ref 0–150)
TSH SERPL DL<=0.05 MIU/L-ACNC: 1.76 UIU/ML (ref 0.27–4.2)
VLDLC SERPL-MCNC: 33 MG/DL (ref 5–40)

## 2025-02-17 RX ORDER — SYRINGE WITH NEEDLE, 1 ML 25GX5/8"
SYRINGE, EMPTY DISPOSABLE MISCELLANEOUS
Qty: 1 EACH | Refills: 5 | Status: SHIPPED | OUTPATIENT
Start: 2025-02-17

## 2025-02-21 LAB — DRUGS UR: NORMAL

## 2025-02-28 ENCOUNTER — OFFICE VISIT (OUTPATIENT)
Dept: ORTHOPEDIC SURGERY | Facility: CLINIC | Age: 60
End: 2025-02-28
Payer: COMMERCIAL

## 2025-02-28 VITALS
WEIGHT: 167 LBS | DIASTOLIC BLOOD PRESSURE: 72 MMHG | SYSTOLIC BLOOD PRESSURE: 128 MMHG | HEIGHT: 65 IN | BODY MASS INDEX: 27.82 KG/M2

## 2025-02-28 DIAGNOSIS — M75.22 BICEPS TENDINITIS OF LEFT UPPER EXTREMITY: ICD-10-CM

## 2025-02-28 DIAGNOSIS — M25.512 LEFT SHOULDER PAIN, UNSPECIFIED CHRONICITY: ICD-10-CM

## 2025-02-28 DIAGNOSIS — M75.52 BURSITIS OF LEFT SHOULDER: ICD-10-CM

## 2025-02-28 DIAGNOSIS — M75.42 IMPINGEMENT SYNDROME OF LEFT SHOULDER: ICD-10-CM

## 2025-02-28 DIAGNOSIS — S46.002A INJURY OF LEFT ROTATOR CUFF, INITIAL ENCOUNTER: Primary | ICD-10-CM
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
## 2025-03-12 ENCOUNTER — TELEPHONE (OUTPATIENT)
Dept: ORTHOPEDIC SURGERY | Facility: CLINIC | Age: 60
End: 2025-03-12

## 2025-03-12 NOTE — TELEPHONE ENCOUNTER
Caller: Vish Russo    Relationship: Self    Best call back number: [phone]    What orders are you requesting (i.e. lab or imaging): MRI- LEFT SHOULDER    In what timeframe would the patient need to come in: ASAP    Where will you receive your lab/imaging services: [facility]    Additional notes: SPOKE WITH PT -SHE IS CALLING TO CHECK ON THE STATUS OF AN MRI OF HER LEFT SHOULDER. PT STATES THAT [facility] DOES NOT HAVE THE ORDER AS OF YET    PT STATES THAT SHE NEEDS THIS DONE ASAP.  PLEASE CONTACT PT AND ADVISE

## 2025-03-25 ENCOUNTER — OFFICE VISIT (OUTPATIENT)
Age: 60
End: 2025-03-25
Payer: COMMERCIAL

## 2025-03-25 VITALS
DIASTOLIC BLOOD PRESSURE: 70 MMHG | HEIGHT: 65 IN | SYSTOLIC BLOOD PRESSURE: 132 MMHG | WEIGHT: 167.11 LBS | BODY MASS INDEX: 27.84 KG/M2

## 2025-03-25 DIAGNOSIS — M75.112 NONTRAUMATIC INCOMPLETE TEAR OF LEFT ROTATOR CUFF: ICD-10-CM

## 2025-03-25 DIAGNOSIS — S43.432A SUPERIOR GLENOID LABRUM LESION OF LEFT SHOULDER, INITIAL ENCOUNTER: Primary | ICD-10-CM

## 2025-03-25 DIAGNOSIS — M75.22 BICEPS TENDINITIS OF LEFT UPPER EXTREMITY: ICD-10-CM

## 2025-03-25 DIAGNOSIS — S46.002A INJURY OF LEFT ROTATOR CUFF, INITIAL ENCOUNTER: ICD-10-CM

## 2025-03-25 DIAGNOSIS — M75.42 IMPINGEMENT SYNDROME OF LEFT SHOULDER: ICD-10-CM

## 2025-03-25 DIAGNOSIS — M75.52 BURSITIS OF LEFT SHOULDER: ICD-10-CM

## 2025-03-25 PROCEDURE — 99214 OFFICE O/P EST MOD 30 MIN: CPT | Performed by: ORTHOPAEDIC SURGERY
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
## 2025-03-26 ENCOUNTER — TELEPHONE (OUTPATIENT)
Dept: ORTHOPEDIC SURGERY | Facility: CLINIC | Age: 60
End: 2025-03-26
Payer: COMMERCIAL

## 2025-03-26 NOTE — TELEPHONE ENCOUNTER
Caller: MIRIAM  Relationship to Patient: SELF  Phone Number: [phone]   Reason for Call:PATIENT ASKING FOR A PHONE CALL ABOUT SOME THINGS THAT SHE HAS HAD COME UP WITH HER LEFT SHOULDER ASKIGN TO SPEAK WITH A NURSE

## 2025-03-26 NOTE — TELEPHONE ENCOUNTER
I spoke to the patient about her shoulder. She was inquiring as to whether or not she could try a steroid injection to see if it helps but also keep her surgery date of 5/12/25.    I informed her that we could provide her with a steroid injection, however, she would have to reschedule her surgery. She will have to wait 3 months from the day of her injection to have surgery. Patient declined to move the surgery date and we discussed OTC treatments she could utilize to minimize her pain.    Patient verbalized understanding.    Roma Rivas MA

## 2025-04-10 RX ORDER — CYANOCOBALAMIN 1000 UG/ML
INJECTION, SOLUTION INTRAMUSCULAR; SUBCUTANEOUS
Qty: 1 ML | Refills: 2 | Status: SHIPPED | OUTPATIENT
Start: 2025-04-10

## 2025-04-29 ENCOUNTER — TRANSCRIBE ORDERS (OUTPATIENT)
Dept: ADMINISTRATIVE | Facility: HOSPITAL | Age: 60
End: 2025-04-29
Payer: COMMERCIAL

## 2025-04-29 DIAGNOSIS — Z12.31 VISIT FOR SCREENING MAMMOGRAM: Primary | ICD-10-CM

## 2025-05-05 ENCOUNTER — OFFICE VISIT (OUTPATIENT)
Dept: INTERNAL MEDICINE | Age: 60
End: 2025-05-05
Payer: COMMERCIAL

## 2025-05-05 VITALS
DIASTOLIC BLOOD PRESSURE: 74 MMHG | BODY MASS INDEX: 27.81 KG/M2 | HEART RATE: 97 BPM | SYSTOLIC BLOOD PRESSURE: 124 MMHG | TEMPERATURE: 97.7 F | OXYGEN SATURATION: 96 % | HEIGHT: 65 IN

## 2025-05-05 DIAGNOSIS — G57.91 NEUROPATHY OF RIGHT FOOT: ICD-10-CM

## 2025-05-05 DIAGNOSIS — F41.1 GENERALIZED ANXIETY DISORDER: ICD-10-CM

## 2025-05-05 DIAGNOSIS — F41.8 DEPRESSION WITH ANXIETY: ICD-10-CM

## 2025-05-05 DIAGNOSIS — E04.0 GOITER DIFFUSE, NONTOXIC: ICD-10-CM

## 2025-05-05 DIAGNOSIS — E78.00 PURE HYPERCHOLESTEROLEMIA: ICD-10-CM

## 2025-05-05 DIAGNOSIS — R05.1 ACUTE COUGH: ICD-10-CM

## 2025-05-05 DIAGNOSIS — U07.1 COVID-19 VIRUS INFECTION: Primary | ICD-10-CM

## 2025-05-05 DIAGNOSIS — G43.909 MIGRAINE WITHOUT STATUS MIGRAINOSUS, NOT INTRACTABLE, UNSPECIFIED MIGRAINE TYPE: ICD-10-CM

## 2025-05-05 LAB
EXPIRATION DATE: ABNORMAL
FLUAV AG UPPER RESP QL IA.RAPID: NOT DETECTED
FLUBV AG UPPER RESP QL IA.RAPID: NOT DETECTED
INTERNAL CONTROL: ABNORMAL
Lab: ABNORMAL
SARS-COV-2 AG UPPER RESP QL IA.RAPID: DETECTED

## 2025-05-05 PROCEDURE — 87428 SARSCOV & INF VIR A&B AG IA: CPT | Performed by: INTERNAL MEDICINE

## 2025-05-05 PROCEDURE — 99214 OFFICE O/P EST MOD 30 MIN: CPT | Performed by: INTERNAL MEDICINE

## 2025-05-05 RX ORDER — BENZONATATE 200 MG/1
200 CAPSULE ORAL 3 TIMES DAILY PRN
Qty: 30 CAPSULE | Refills: 1 | Status: SHIPPED | OUTPATIENT
Start: 2025-05-05

## 2025-05-05 RX ORDER — LEVOTHYROXINE SODIUM 75 MCG
TABLET ORAL
Qty: 87 TABLET | Refills: 0 | Status: SHIPPED | OUTPATIENT
Start: 2025-05-05

## 2025-05-05 RX ORDER — DIAZEPAM 5 MG/1
5 TABLET ORAL EVERY 12 HOURS PRN
Qty: 60 TABLET | Refills: 2 | Status: SHIPPED | OUTPATIENT
Start: 2025-05-05

## 2025-05-05 RX ORDER — GABAPENTIN 300 MG/1
300 CAPSULE ORAL 2 TIMES DAILY
Qty: 60 CAPSULE | Refills: 2 | Status: SHIPPED | OUTPATIENT
Start: 2025-05-05
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
## 2025-05-15 LAB
NCCN CRITERIA FLAG: NORMAL
TYRER CUZICK SCORE: 5.8

## 2025-05-16 ENCOUNTER — OUTSIDE FACILITY SERVICE (OUTPATIENT)
Dept: ORTHOPEDIC SURGERY | Facility: CLINIC | Age: 60
End: 2025-05-16
Payer: COMMERCIAL

## 2025-05-16 ENCOUNTER — DOCUMENTATION (OUTPATIENT)
Dept: ORTHOPEDIC SURGERY | Facility: CLINIC | Age: 60
End: 2025-05-16

## 2025-05-16 DIAGNOSIS — S43.432A SUPERIOR GLENOID LABRUM LESION OF LEFT SHOULDER, INITIAL ENCOUNTER: ICD-10-CM

## 2025-05-16 DIAGNOSIS — M75.22 BICEPS TENDINITIS OF LEFT UPPER EXTREMITY: ICD-10-CM

## 2025-05-16 DIAGNOSIS — Z98.890 STATUS POST ARTHROSCOPY OF LEFT SHOULDER: Primary | ICD-10-CM

## 2025-05-16 RX ORDER — HYDROCODONE BITARTRATE AND ACETAMINOPHEN 10; 325 MG/1; MG/1
1 TABLET ORAL EVERY 6 HOURS PRN
Qty: 28 TABLET | Refills: 0 | Status: SHIPPED | OUTPATIENT
Start: 2025-05-16 | End: 2025-05-23

## 2025-05-16 RX ORDER — METHOCARBAMOL 500 MG/1
500 TABLET, FILM COATED ORAL 3 TIMES DAILY PRN
Qty: 42 TABLET | Refills: 0 | Status: SHIPPED | OUTPATIENT
Start: 2025-05-16 | End: 2025-05-30

## 2025-05-16 RX ORDER — ONDANSETRON 4 MG/1
4 TABLET, FILM COATED ORAL EVERY 6 HOURS PRN
Qty: 32 TABLET | Refills: 0 | Status: SHIPPED | OUTPATIENT
Start: 2025-05-16 | End: 2025-05-24

## 2025-05-16 NOTE — PROGRESS NOTES
Operative Report  LOCATION:   [facility] [place]  [address] [place] [address]    [facility] OPERATIVE REPORT    Surgeon: Ismael Song MD  Assistant: SHANAE Bernal     The skilled assistance of the above noted first assistant was necessary during this complex surgical procedure.  The surgical assistant assisted with every aspect of the operation including, but not limited to, proper and safe positioning of the patient, obtaining adequate surgical exposure, manipulation of surgical instruments, suture management, surgical knot tying when necessary, the continual process of hemostasis during the procedure itself in addition to surgical wound closure and removal of the patient from the operating table and returning the patient back to the [facility].  The assistance of the surgical assistant allowed me to perform the most sensitive and technical potions of this operation using 2 hands, thus enhancing efficiency and patient safety.  This would not be possible without the help of a skilled assistant familiar with the procedure and capable of safely performing the aforementioned tasks.     Date of Surgery: 5/16/2025  Shoulder: LEFT shoulder   Preoperative Diagnosis:  1.     Rotator cuff tear, full-thickness rotator cuff tearing was noted intraoperatively- treated with arthroscopic rotator cuff repair - CPT 70166  2.     Biceps tendinopathy and synovitis, SLAP tearing and partial biceps tearing- treated with arthroscopic biceps tenodesis - CPT 18668  3.     Shoulder impingement syndrome, partial tearing CA ligament - treated with arthroscopic subacromial decompression with acromioplasty - CPT 70383     Postoperative Diagnosis:  1.     SAME as preoperative diagnoses     Procedure:  1.     Arthroscopic rotator cuff repair including margin convergence (1x2) - CPT 35898  2.     Arthroscopic biceps tenodesis (4.5mm anchor) - CPT code  80302  3.     Arthroscopic subacromial decompression with acromioplasty - CPT 09062        Admission status: elective outpatient surgery  Complications: None  EBL: 15mL  Specimen: NONE  Anesthesia: general anesthesia  Block: regional interscalene block with single shot per anesthesia  Antibiotics: weight based IV antibiotics infused prior to incision  Time out: Time out was called and the patient, side, site, and intended procedures were confirmed.  Counts: Needle and sponge counts were correct prior to closure.  Marked: The patient was marked with an indelible marker in the preoperative holding area confirming the correct side and site.     History & Physical:   A history and physical examination was completed and updated in the preoperative holding area.     Consent: The patient signed the consent form for surgery with an understanding of the risks and benefits as outlined in the clinic and in the preoperative holding area.    She has been through rotator cuff repair on the contralateral side and understands the risks and benefits.  We were hopeful that the rotator cuff was intact but unfortunately full-thickness rotator cuff tearing was noted in addition to the SLAP tearing and partial biceps tearing.  Patient is aware of the risks and benefits.     Indications for surgery:  The patient has history, physical examination, and radiographic findings confirming the diagnoses.  The patient has persistent pain and functional loss unresponsive to non-operative treatment consisting of selective rest/activity modification, medications, and exercises.  MRI documented the status of the rotator cuff - rotator cuff tearing was noted intraoperatively-concern for preoperative partial tearing with significant tearing noted intraoperatively.     Impingement syndrome -impingement syndrome was noted and partial tearing of the CA ligament was noted and ultimately treated with shaver and cautery device along with minimal  acromioplasty as part of the subacromial decompression    The patient has been through similar things on the contralateral side with prior rotator cuff repair and subsequent trauma and had revision repair.  Unfortunately this added significant biceps pathology, SLAP tearing and ultimately intraoperatively was noted to have full-thickness rotator cuff tearing fairly anterior full-thickness tearing more of a medial to lateral split tear that required a margin convergence type fixation.     Operative Findings:  Rotator Cuff Tissue Quality: Full-thickness tearing was noted intraoperatively fairly anterior tear and a split tear from medial to lateral that called for margin convergence closure with the medial sutures     Status of the Rotator Cuff:  Supraspinatus -full-thickness tearing     Size of Rotator Cuff Tear:  Supraspinatus -full-thickness tearing 8 to 9 mm     Rotator Cuff Repair Construct:  1x2 Transosseous Equivalent Double Row Arthroscopic Rotator Cuff Repair   I tied the 2 inner blue sutures and a margin convergence fashion and then used 2 lateral row anchors for additional footprint coverage     Rotator Cuff Implants:  Medial Row: 1 Medial [place] - double loaded [employer]hew 4.5mm Healicoil PEEK   Lateral Row: 2 Lateral Anchors - [employer] Nephew 5.5mm Footprint PEEK      Bone Quality: Solid and quality with all 4 anchors  Labrum: Degenerative SLAP tear  Biceps: tendinopathy and synovitis, partial tearing/fraying and degenerative SLAP tearing  Biceps Tenodesis Construct:  Suprapectoral biceps tenodesis in the bicipital groove     Biceps Tendon Implant:  Smith [employer] Nephew 4.5mm Footprint PEEK with 2 Ultrabraid sutures     Humeral Head: Negative  Glenoid: Labral tear  Contracture: Negative  Pathological laxity: Negative     Procedure in detail:  Regional interscalene block was completed in the preoperative area by the anesthesia team.  The patient was supine on the operative table and the anesthesia team  initiated general anesthesia.  The patient was placed in a modified beach chair position with the neck secured in neutral alignment and all bony prominences were well padded.     The shoulder was assessed with an examination under anesthesia.    Cleaned with alcohol sterilely prepped and draped.  Made a standard posterior portal posterolateral anterior lateral and anterior portals along with the accessory portal to place the medial row anchor.  Findings in the joint were notable for SLAP tearing degenerative and partial fraying of the long head of the biceps.  Undersurface tearing of the rotator cuff was noted but was far more impressive in the subacromial space.  I marked the area of tearing with a spinal needle prior to transitioning to the subacromial space.  I performed a tenotomy the biceps prior to subsequent biceps tenodesis and debrided the degenerative labral tearing.  Perhaps mild fraying on the upper portion of the subscapularis.    I transition to the subacromial space and identified the spinal needle area and there is clear area of full-thickness tearing a split tear running more from medial to lateral. The 4.5mm shaver/cautery device was used to clear the subacromial space until the acromion could be identified and bursal resection was completed to allow rotator cuff visualization.  The shaver was used to remove fibrous tissue from the acromial undersurface until the anterolateral and anterior medial corners of the acromion were clearly identified.  The coracoacromial ligament was not released but partial tearing of the CA ligament was noted and debrided as part of the subacromial decompression.  The shaver was used to perform a minimal acromioplasty.  The shaver/cautery device was used to perform the subacromial decompression with minimal acromioplasty.    I turned my attention to the arthroscopic biceps tenodesis. The arm was placed in a slightly external rotator position and the elbow flexed and  shoulder forward flexed into a biceps tendon repair position.  The biceps tendon repair position was achieved in order to try to maintain proper biceps tendon length-tension relationship during the repair.  Biceps tenodesis was performed.  I released the transverse humeral ligament overlying the biceps in the subacromial space above the pectoralis major.  Biceps pathology was visualized as noted above.  Once long head of the biceps tendon mobility was achieved I held it out of the way from the anterior portal and drilled for the 4.5 mm Smith & Nephew footprint PEEK anchor while protecting the biceps tendon.  A  hole was drilled and then the gold awl was utilized.  A self capturing suture passing device was utilized to pass two Ultrabraid sutures in a looped configuration/self capturing configuration.  Excellent suture capture of the biceps tendon in a self capturing looped configuration with both suture limbs.  The sutures were tensioned and no memory was noted in the construct.  The sutures were passed through the knotless anchor.  The sutures and anchor were placed through the  hole that was already placed.  The anchor was impacted into place tensioned and fully seated.  The seated anchor and sutures were dynamically tested and stable fixation was noted.  The repaired biceps tendon was tested and noted to be stable.  The residual stump was excised.      I turned my attention to the rotator cuff tear and the arthroscopic rotator cuff repair.  The tearing was clearly noted I cleaned off the greater tuberosity.  She had Duncan split tear medially that called for a margin convergence suture this was performed through the anchor.  I placed the accessory portal for the medial row placement passed all 4 suture limbs in an inverted mattress fashion.  I tied the 2 inner blue sutures for a margin convergence type closure I looked at 1 lateral row anchor but felt that 2 lateral anchors would give much better  footprint coverage.  The sutures were then inserted laterally into 2 lateral knotless anchors with appropriate tensioning.  The completed arthroscopic rotator cuff repair was inspected dynamically and the arthroscopic instruments removed along with the arthroscopic fluid. The incisions were closed with nylon suture and steri-strips were placed over the incisions.  A sterile dressing was applied followed by a neutral rotation sling.  The patient tolerated the procedure well and was transported to the recovery room in satisfactory condition.          Rotator Cuff Repair - POSTOPERATIVE PLAN:  Follow-up in the office in 2 weeks with 1 view of the operative shoulder at that time  Sutures: Nylon sutures to come out in 2 weeks  DVT prophylaxis: no additional chemical DVT prophylaxis indicated  Weightbearing: Nonweightbearing on operative extremity, no biceps loading, pendulums/elbow/wrist/hand motion encouraged  Neutral rotation sling for 3 to 4 weeks following the surgery  Physical therapy: Formal outpatient physical therapy will be provided at the 2-week appointment in the office.  Rotator cuff repair protocol    Electronically signed by Ismael Song MD, 05/16/25, 8:18 AM EDT.

## 2025-05-29 ENCOUNTER — OFFICE VISIT (OUTPATIENT)
Dept: ORTHOPEDIC SURGERY | Facility: CLINIC | Age: 60
End: 2025-05-29
Payer: COMMERCIAL

## 2025-05-29 VITALS — TEMPERATURE: 98.2 F

## 2025-05-29 DIAGNOSIS — Z98.890 STATUS POST ARTHROSCOPY OF LEFT SHOULDER: Primary | ICD-10-CM

## 2025-05-29 RX ORDER — HYDROCODONE BITARTRATE AND ACETAMINOPHEN 10; 325 MG/1; MG/1
1 TABLET ORAL EVERY 6 HOURS PRN
Qty: 28 TABLET | Refills: 0 | Status: SHIPPED | OUTPATIENT
Start: 2025-05-29 | End: 2025-06-05

## 2025-05-29 NOTE — PROGRESS NOTES
[facility] Office Follow Up       Office Follow Up Visit       Patient Name: Vish Russo    Chief Complaint:   Chief Complaint   Patient presents with   • Post-op     2 week s/p Left shoulder arthroscopy w/ rotator cuff repair, Biceps tenodesis, Subacromial decompression w/ Acromioplasty   (DOS - 5/16/25)       Referring Physician: No ref. provider found    History of Present Illness:   Vish Russo returns to clinic today for 2-week postop visit s/p left shoulder arthroscopic rotator cuff repair, biceps tenodesis, subacromial decompression with Dr. Song.  She reports expected amount of postoperative pain.  She has been wearing sling as instructed.  Off work currently.    Date of Surgery: 5/16/2025  Shoulder: LEFT shoulder   Preoperative Diagnosis:  1.     Rotator cuff tear, full-thickness rotator cuff tearing was noted intraoperatively- treated with arthroscopic rotator cuff repair - CPT 93937  2.     Biceps tendinopathy and synovitis, SLAP tearing and partial biceps tearing- treated with arthroscopic biceps tenodesis - CPT 10010  3.     Shoulder impingement syndrome, partial tearing CA ligament - treated with arthroscopic subacromial decompression with acromioplasty - CPT 00706     Postoperative Diagnosis:  1.     SAME as preoperative diagnoses     Procedure:  1.     Arthroscopic rotator cuff repair including margin convergence (1x2) - CPT 56096  2.     Arthroscopic biceps tenodesis (4.5mm anchor) - CPT code 34863  3.     Arthroscopic subacromial decompression with acromioplasty - CPT 33686    Right shoulder-- doing well s/p revision rotator cuff repair  Date of surgery 5/22/2023  Preoperative Diagnosis:  1.       Acute rotator cuff tear in setting of prior arthroscopic repair- treated with arthroscopic rotator cuff repair - CPT 11970    Subjective     Review of Systems   Constitutional: Negative.  Negative for chills,  "fatigue and fever.   HENT: Negative.  Negative for congestion and dental problem.    Eyes: Negative.  Negative for blurred vision.   Respiratory: Negative.  Negative for shortness of breath.    Cardiovascular: Negative.  Negative for leg swelling.   Gastrointestinal: Negative.  Negative for abdominal pain.   Endocrine: Negative.  Negative for polyuria.   Genitourinary: Negative.  Negative for difficulty urinating.   Musculoskeletal:  Positive for arthralgias.   Skin: Negative.    Allergic/Immunologic: Negative.    Neurological: Negative.    Hematological: Negative.  Negative for adenopathy.   Psychiatric/Behavioral: Negative.  Negative for behavioral problems.         I have reviewed and updated the following portions of the patient's history and review of systems: allergies, current medications, past family history, past medical history, past social history, past surgical history and problem list.    Medications:   Current Outpatient Medications:   •  benzonatate (TESSALON) 200 MG capsule, Take 1 capsule by mouth 3 (Three) Times a Day As Needed for Cough., Disp: 30 capsule, Rfl: 1  •  cyanocobalamin 1000 MCG/ML injection, INJECT 1 ML AS DIRECTED EVERY 30 DAYS, Disp: 1 mL, Rfl: 2  •  diazePAM (VALIUM) 5 MG tablet, TAKE 1 TABLET BY MOUTH EVERY 12 HOURS AS NEEDED FOR ANXIETY, Disp: 60 tablet, Rfl: 2  •  gabapentin (NEURONTIN) 300 MG capsule, TAKE 1 CAPSULE BY MOUTH TWO TIMES A DAY, Disp: 60 capsule, Rfl: 2  •  methocarbamol (ROBAXIN) 500 MG tablet, Take 1 tablet by mouth 3 (Three) Times a Day As Needed for Muscle Spasms for up to 14 days., Disp: 42 tablet, Rfl: 0  •  Nirmatrelvir & Ritonavir, 300mg/100mg, (PAXLOVID), Take 3 tablets by mouth 2 (Two) Times a Day., Disp: 30 each, Rfl: 0  •  Synthroid 75 MCG tablet, TAKE 1 TABLET BY MOUTH ONE HOUR BEFORE BREAKFAST OR ANY MEDICATIONS, Disp: 87 tablet, Rfl: 0  •  Syringe/Needle, Disp, (B-D 3CC LUER-DANDY SYR 25GX1\") 25G X 1\" 3 ML misc, USE AS DIRECTED WITH B12 INJECTION, " Disp: 1 each, Rfl: 5  •  HYDROcodone-acetaminophen (NORCO)  MG per tablet, Take 1 tablet by mouth Every 6 (Six) Hours As Needed for Severe Pain for up to 7 days., Disp: 28 tablet, Rfl: 0    Allergies:   Allergies   Allergen Reactions   • Imitrex [Sumatriptan] Confusion     Also caused chest pain   • Propranolol Swelling   • Zyprexa [Olanzapine] Swelling         Objective      Vital Signs:   Vitals:    05/29/25 1019   Temp: 98.2 °F (36.8 °C)       Ortho Exam:  General: comfortable  Vascular: 2+ radial pulse  Neurologic: sensation to light touch is intact distally, elbow flexion/elbow extension/wrist flexion/wrist extension/hand intrinsics intact, able to fire deltoid; no residual defects noted from the block  Dermatologic: surgical incisions are well appearing, with no drainage or surrounding erythema; no signs or symptoms of infection or DVT      Results Review:  XR Shoulder 1 View Left  Imaging: shoulder x-rays 1 view - AP x-ray views    Side: LEFT SHOULDER    Indication for shoulder x-ray 1 view: shoulder pain, postop evaluation   following surgery    Comparison: the current xray was compared to preoperative imaging and   indicates expected postoperative changes.    Findings: No acute bony pathology. Well appearing and well positioned   compared to preoperative imaging.  Located and no fracture noted.    I personally reviewed the above x-rays.       MRI Shoulder Left Without Contrast  Result Date: 3/18/2025  Moderate supraspinatus and infraspinatus tendinopathy, associated with low to intermediate grade interstitial tear and bursal sided fraying. Mild tendinopathy of the subscapularis.  Mild-to-moderate tendinopathy of the intra-articular segment of the biceps. Degenerative tear of the superior labrum. Degenerative changes in the acromioclavicular and glenohumeral joints as above described. Subacromial enthesophyte seen, with narrowing of the subacromial space. Small bone lesions in the glenoid and humeral  head, without obvious features, which may suggest enchondromas. 3 months follow-up is recommended for possible interval changes. Mild to moderate subacromial subdeltoid bursitis. CRITICAL RESULT:   No. COMMUNICATION: Per this written report. Drafted by Golden Chowdhury MD on 3/18/2025 10:55 AM Final report signed by Golden Chowdhury MD on 3/18/2025 11:03 AM        Assessment / Plan      Assessment:   Diagnoses and all orders for this visit:    1. Status post arthroscopy of left shoulder (Primary)  -     XR Shoulder 1 View Left  -     Ambulatory Referral to Physical Therapy for Evaluation & Treatment        Quality Metrics:   BMI:   BMI is >= 25 and <30. (Overweight) The following options were offered after discussion;: weight loss educational material (shared in after visit summary)       Tobacco:   Vish Rusos  reports that she has been smoking cigarettes. She started smoking about 23 years ago. She has a 10 pack-year smoking history. She has been exposed to tobacco smoke. She has never used smokeless tobacco.           Plan:  X-ray images left shoulder reviewed today.  No acute bony findings.  Refill provided for pain medication.  Encouraged to transition to over-the-counter anti-inflammatories as tolerated.  She will continue in sling for an additional 2 weeks.  May take breaks as needed.  Avoid weightbearing activity on the left side.  Referral to physical therapy.  She would like to go to [facility] location.  Sutures removed today.      Follow Up:   2 months      Dari Ruiz PA-C  [facility]    Dictated using Dragon Speech Recognition.

## 2025-05-30 ENCOUNTER — HOSPITAL ENCOUNTER (OUTPATIENT)
Dept: MAMMOGRAPHY | Facility: HOSPITAL | Age: 60
Discharge: HOME | End: 2025-05-30
Payer: COMMERCIAL

## 2025-06-02 ENCOUNTER — TELEPHONE (OUTPATIENT)
Dept: ORTHOPEDIC SURGERY | Facility: CLINIC | Age: 60
End: 2025-06-02

## 2025-06-02 NOTE — TELEPHONE ENCOUNTER
Caller: MIRIAM  Relationship to Patient: SELF  Phone Number: [phone]  Reason for Call:  NEEDS REFERRAL TO PHYSCIAL YULY WANTS TO GO TO [facility] HOWEVER THEY ARE STATING THEY DIDN'T GET HER REFERRAL AND HAVING NOTHING UNTIL THE END OF JUNE DOESN'T KNOW WHERE ELSE TO GO     ALSO ASKING FOR HER POST OP REPORT TO BE POSTED TO MY CHART FOR HER INSURANCE

## 2025-06-06 ENCOUNTER — TELEPHONE (OUTPATIENT)
Dept: ORTHOPEDIC SURGERY | Facility: CLINIC | Age: 60
End: 2025-06-06
Payer: COMMERCIAL

## 2025-06-06 NOTE — TELEPHONE ENCOUNTER
Caller: Vish Russo    Relationship: Self    Best call back number: [phone]    What form or medical record are you requesting: OPERATIVE REPORT FROM 5-16-25     Who is requesting this form or medical record from you: PATIENT     How would you like to receive the form or medical records (pick-up, mail, fax): MAIL   If mail, what is the address:   [address]  [facility] [address]

## 2025-06-23 ENCOUNTER — TELEPHONE (OUTPATIENT)
Dept: ORTHOPEDIC SURGERY | Facility: CLINIC | Age: 60
End: 2025-06-23
Payer: COMMERCIAL

## 2025-06-23 NOTE — TELEPHONE ENCOUNTER
PT NEEDS TO TAPE WOULD LIKE TO DISCUSS AND GET VERBAL CONFIRMATION THAT IT IS OKAY      PLEASE ADVISE

## 2025-06-26 ENCOUNTER — TELEPHONE (OUTPATIENT)
Dept: ORTHOPEDIC SURGERY | Facility: CLINIC | Age: 60
End: 2025-06-26
Payer: COMMERCIAL

## 2025-06-26 NOTE — TELEPHONE ENCOUNTER
Provider: CHAPIS    Caller: Vish Russo    Relationship to Patient: Self    Pharmacy:     Phone Number: [phone]    Reason for Call: PT CALLED TO SPEAK TO SOMEONE IN THE OFFICE ABOUT HER ACCIDENT SHE HAD AND THE ISSUE SHE IS A HAVING WITH HER INS AND NEEDS TO FIND A WAY TO RESOLVE IT

## 2025-06-27 NOTE — TELEPHONE ENCOUNTER
Patient requesting that Dr. Song addend his last note to state that she was moving furniture in November and that's when she realized it was not getting better she made an appointment.     She states that they verbally talked about it but insurance is making it difficult since it was not included in her note. Please advise patient.

## 2025-06-27 NOTE — TELEPHONE ENCOUNTER
Phone call to patient and let her know that the note has been addended.  She was very appreciative.

## 2025-07-02 ENCOUNTER — TELEPHONE (OUTPATIENT)
Dept: ORTHOPEDIC SURGERY | Facility: CLINIC | Age: 60
End: 2025-07-02

## 2025-07-02 DIAGNOSIS — Z98.890 S/P LEFT ROTATOR CUFF REPAIR: Primary | ICD-10-CM

## 2025-07-02 DIAGNOSIS — Z98.890 STATUS POST ARTHROSCOPY OF LEFT SHOULDER: ICD-10-CM

## 2025-07-02 NOTE — TELEPHONE ENCOUNTER
Caller: MIRIAM    Relationship: SELF    Best call back number: [phone]    What is the best time to reach you: ANY    Who are you requesting to speak with (clinical staff, provider,  specific staff member): CLINICAL    What was the call regarding: NOT HAPPY WITH PHYSICAL THERAPY- SHE IS ONLY DOING CHIN EXERCISES AND SHOULDER SHRUGS AND ICING- SHE KEEPS ASKING FOR A DIFFERENT HOME EXERCISES- WOULD LIKE TO BE REFERRED TO PT AT - SHE WOULD LIKE TO BE SEEN SOONER AT THE OFFICE TO SHOW WHAT SHE IS DOING AT HOME- PLEASE CALL

## 2025-07-11 RX ORDER — CYANOCOBALAMIN 1000 UG/ML
INJECTION, SOLUTION INTRAMUSCULAR; SUBCUTANEOUS
Qty: 1 ML | Refills: 2 | Status: SHIPPED | OUTPATIENT
Start: 2025-07-11

## 2025-07-29 ENCOUNTER — OFFICE VISIT (OUTPATIENT)
Age: 60
End: 2025-07-29
Payer: COMMERCIAL

## 2025-07-29 DIAGNOSIS — Z98.890 S/P LEFT ROTATOR CUFF REPAIR: ICD-10-CM

## 2025-07-29 DIAGNOSIS — Z98.890 STATUS POST ARTHROSCOPY OF LEFT SHOULDER: Primary | ICD-10-CM
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
## 2025-08-12 ENCOUNTER — LAB (OUTPATIENT)
Dept: INTERNAL MEDICINE | Age: 60
End: 2025-08-12
Payer: COMMERCIAL

## 2025-08-12 ENCOUNTER — HOSPITAL ENCOUNTER (OUTPATIENT)
Dept: MAMMOGRAPHY | Facility: HOSPITAL | Age: 60
Discharge: HOME | End: 2025-08-12
Admitting: INTERNAL MEDICINE
Payer: COMMERCIAL

## 2025-08-12 ENCOUNTER — OFFICE VISIT (OUTPATIENT)
Dept: INTERNAL MEDICINE | Age: 60
End: 2025-08-12
Payer: COMMERCIAL

## 2025-08-12 VITALS
BODY MASS INDEX: 28.32 KG/M2 | DIASTOLIC BLOOD PRESSURE: 82 MMHG | OXYGEN SATURATION: 96 % | HEART RATE: 86 BPM | SYSTOLIC BLOOD PRESSURE: 128 MMHG | HEIGHT: 65 IN | WEIGHT: 170 LBS

## 2025-08-12 DIAGNOSIS — T78.40XA ALLERGY, INITIAL ENCOUNTER: ICD-10-CM

## 2025-08-12 DIAGNOSIS — Z12.31 VISIT FOR SCREENING MAMMOGRAM: ICD-10-CM

## 2025-08-12 DIAGNOSIS — Z79.899 MEDICATION MANAGEMENT: ICD-10-CM

## 2025-08-12 DIAGNOSIS — E78.00 PURE HYPERCHOLESTEROLEMIA: ICD-10-CM

## 2025-08-12 DIAGNOSIS — G57.91 NEUROPATHY OF RIGHT FOOT: ICD-10-CM

## 2025-08-12 DIAGNOSIS — F41.1 GENERALIZED ANXIETY DISORDER: Primary | ICD-10-CM

## 2025-08-12 PROBLEM — U07.1 COVID-19 VIRUS INFECTION: Status: RESOLVED | Noted: 2025-05-05 | Resolved: 2025-08-12

## 2025-08-12 PROBLEM — R05.1 ACUTE COUGH: Status: RESOLVED | Noted: 2025-05-05 | Resolved: 2025-08-12

## 2025-08-12 PROBLEM — M25.511 ACUTE PAIN OF RIGHT SHOULDER: Status: RESOLVED | Noted: 2023-04-25 | Resolved: 2025-08-12

## 2025-08-12 PROCEDURE — 80061 LIPID PANEL: CPT | Performed by: INTERNAL MEDICINE

## 2025-08-12 PROCEDURE — 77067 SCR MAMMO BI INCL CAD: CPT

## 2025-08-12 PROCEDURE — 77067 SCR MAMMO BI INCL CAD: CPT | Performed by: RADIOLOGY

## 2025-08-12 PROCEDURE — 36415 COLL VENOUS BLD VENIPUNCTURE: CPT | Performed by: INTERNAL MEDICINE

## 2025-08-12 PROCEDURE — 77063 BREAST TOMOSYNTHESIS BI: CPT | Performed by: RADIOLOGY

## 2025-08-12 PROCEDURE — 77063 BREAST TOMOSYNTHESIS BI: CPT

## 2025-08-12 RX ORDER — FLUTICASONE PROPIONATE 50 MCG
2 SPRAY, SUSPENSION (ML) NASAL DAILY
Qty: 16 G | Refills: 5 | Status: SHIPPED | OUTPATIENT
Start: 2025-08-12

## 2025-08-12 RX ORDER — GABAPENTIN 300 MG/1
300 CAPSULE ORAL 3 TIMES DAILY
Qty: 90 CAPSULE | Refills: 2 | Status: SHIPPED | OUTPATIENT
Start: 2025-08-12

## 2025-08-13 LAB
CHOLEST SERPL-MCNC: 219 MG/DL (ref 0–200)
HDLC SERPL-MCNC: 45 MG/DL (ref 40–60)
LDLC SERPL CALC-MCNC: 149 MG/DL (ref 0–100)
LDLC/HDLC SERPL: 3.26 {RATIO}
TRIGL SERPL-MCNC: 136 MG/DL (ref 0–150)
VLDLC SERPL-MCNC: 25 MG/DL (ref 5–40)

## 2025-08-14 DIAGNOSIS — F41.8 DEPRESSION WITH ANXIETY: ICD-10-CM

## 2025-08-15 RX ORDER — DIAZEPAM 5 MG/1
5 TABLET ORAL EVERY 12 HOURS PRN
Qty: 60 TABLET | Refills: 2 | Status: SHIPPED | OUTPATIENT
Start: 2025-08-15

## 2025-08-19 LAB — DRUGS UR: NORMAL
